# Patient Record
Sex: MALE | Race: WHITE | NOT HISPANIC OR LATINO | Employment: OTHER | URBAN - METROPOLITAN AREA
[De-identification: names, ages, dates, MRNs, and addresses within clinical notes are randomized per-mention and may not be internally consistent; named-entity substitution may affect disease eponyms.]

---

## 2017-02-06 ENCOUNTER — HOSPITAL ENCOUNTER (OUTPATIENT)
Dept: RADIOLOGY | Facility: CLINIC | Age: 62
Discharge: HOME | End: 2017-02-06
Payer: COMMERCIAL

## 2017-02-06 ENCOUNTER — ALLSCRIPTS OFFICE VISIT (OUTPATIENT)
Dept: OTHER | Facility: OTHER | Age: 62
End: 2017-02-06

## 2017-02-06 DIAGNOSIS — R09.89 OTHER SPECIFIED SYMPTOMS AND SIGNS INVOLVING THE CIRCULATORY AND RESPIRATORY SYSTEMS: ICD-10-CM

## 2017-02-06 PROCEDURE — 71020 HB CHEST X-RAY 2VW FRONTAL&LATL: CPT

## 2017-02-08 ENCOUNTER — GENERIC CONVERSION - ENCOUNTER (OUTPATIENT)
Dept: OTHER | Facility: OTHER | Age: 62
End: 2017-02-08

## 2018-01-13 VITALS
HEIGHT: 68 IN | DIASTOLIC BLOOD PRESSURE: 70 MMHG | TEMPERATURE: 99.8 F | WEIGHT: 221 LBS | BODY MASS INDEX: 33.49 KG/M2 | SYSTOLIC BLOOD PRESSURE: 110 MMHG

## 2018-01-16 NOTE — RESULT NOTES
Verified Results  * XR CHEST PA & LATERAL [id number] 02:51PM Madeline Alpers Order Number: [id number]     Test Name Result Flag Reference   XR CHEST PA & LATERAL (Report)     CHEST      INDICATION: Cough, several weeks  COMPARISON: 11/22/2014     VIEWS: Frontal and lateral projections; 2 images     FINDINGS:        Cardiomediastinal silhouette appears unremarkable  The lungs are clear  No pneumothorax or pleural effusion  Visualized osseous structures appear within normal limits for the patient's age  IMPRESSION:     No active pulmonary disease         Workstation performed: [id number]     Signed by:   Evelin Aldrich DO   2/8/17

## 2018-09-10 RX ORDER — DICLOFENAC SODIUM 25 MG/1
1 TABLET, DELAYED RELEASE ORAL DAILY
COMMUNITY
Start: 2016-02-29 | End: 2019-02-11 | Stop reason: ALTCHOICE

## 2018-09-10 RX ORDER — ALBUTEROL SULFATE 90 UG/1
2 AEROSOL, METERED RESPIRATORY (INHALATION)
COMMUNITY
Start: 2016-10-22 | End: 2019-02-11 | Stop reason: ALTCHOICE

## 2018-09-10 RX ORDER — OMEPRAZOLE 20 MG/1
1 CAPSULE, DELAYED RELEASE ORAL DAILY
COMMUNITY
Start: 2016-02-29 | End: 2019-02-11 | Stop reason: ALTCHOICE

## 2018-09-15 ENCOUNTER — OFFICE VISIT (OUTPATIENT)
Dept: FAMILY MEDICINE CLINIC | Facility: CLINIC | Age: 63
End: 2018-09-15
Payer: COMMERCIAL

## 2018-09-15 VITALS
WEIGHT: 226 LBS | HEIGHT: 68 IN | SYSTOLIC BLOOD PRESSURE: 138 MMHG | BODY MASS INDEX: 34.25 KG/M2 | DIASTOLIC BLOOD PRESSURE: 80 MMHG

## 2018-09-15 DIAGNOSIS — N40.1 BENIGN PROSTATIC HYPERPLASIA WITH URINARY FREQUENCY: ICD-10-CM

## 2018-09-15 DIAGNOSIS — N52.9 ERECTILE DYSFUNCTION, UNSPECIFIED ERECTILE DYSFUNCTION TYPE: Primary | ICD-10-CM

## 2018-09-15 DIAGNOSIS — R05.8 ALLERGIC COUGH: ICD-10-CM

## 2018-09-15 DIAGNOSIS — R35.0 BENIGN PROSTATIC HYPERPLASIA WITH URINARY FREQUENCY: ICD-10-CM

## 2018-09-15 DIAGNOSIS — E78.5 HYPERLIPIDEMIA, UNSPECIFIED HYPERLIPIDEMIA TYPE: ICD-10-CM

## 2018-09-15 PROCEDURE — 36415 COLL VENOUS BLD VENIPUNCTURE: CPT | Performed by: FAMILY MEDICINE

## 2018-09-15 PROCEDURE — 99214 OFFICE O/P EST MOD 30 MIN: CPT | Performed by: FAMILY MEDICINE

## 2018-09-15 PROCEDURE — 3008F BODY MASS INDEX DOCD: CPT | Performed by: FAMILY MEDICINE

## 2018-09-15 PROCEDURE — 1036F TOBACCO NON-USER: CPT | Performed by: FAMILY MEDICINE

## 2018-09-15 RX ORDER — LORATADINE 10 MG/1
10 TABLET ORAL DAILY
Qty: 30 TABLET | Refills: 0 | Status: SHIPPED | OUTPATIENT
Start: 2018-09-15 | End: 2018-10-12 | Stop reason: SDUPTHER

## 2018-09-15 RX ORDER — SILDENAFIL 100 MG/1
100 TABLET, FILM COATED ORAL DAILY PRN
Qty: 10 TABLET | Refills: 0 | Status: SHIPPED | OUTPATIENT
Start: 2018-09-15 | End: 2019-08-19 | Stop reason: ALTCHOICE

## 2018-09-15 RX ORDER — DEXTROMETHORPHAN HYDROBROMIDE AND PROMETHAZINE HYDROCHLORIDE 15; 6.25 MG/5ML; MG/5ML
5 SYRUP ORAL 4 TIMES DAILY PRN
Qty: 118 ML | Refills: 0 | Status: SHIPPED | OUTPATIENT
Start: 2018-09-15 | End: 2019-02-11 | Stop reason: ALTCHOICE

## 2018-09-15 NOTE — ASSESSMENT & PLAN NOTE
Patient has symptomatic BPH presently  We are going to check a PSA  He declines alpha blocker presently  He will consider this and will readdress it when we call him with his blood work results  He agrees

## 2018-09-15 NOTE — PROGRESS NOTES
Assessment/Plan:  Erectile dysfunction  Patient is incomplete erectile dysfunction  We are going to give him a trial of Viagra at his request     Benign prostatic hyperplasia with urinary frequency  Patient has symptomatic BPH presently  We are going to check a PSA  He declines alpha blocker presently  He will consider this and will readdress it when we call him with his blood work results  He agrees  Hyperlipidemia  Lipid profile today  Allergic cough  Will have him try loratadine 10 mg daily as well as promethazine DM for relief of his current symptoms  He is asked to call with report over the next several days  He agrees  Diagnoses and all orders for this visit:    Erectile dysfunction, unspecified erectile dysfunction type  -     sildenafil (VIAGRA) 100 mg tablet; Take 1 tablet (100 mg total) by mouth daily as needed for erectile dysfunction    Allergic cough  -     promethazine-dextromethorphan (PHENERGAN-DM) 6 25-15 mg/5 mL oral syrup; Take 5 mL by mouth 4 (four) times a day as needed for cough  -     loratadine (CLARITIN) 10 mg tablet; Take 1 tablet (10 mg total) by mouth daily    Benign prostatic hyperplasia with urinary frequency  -     Comprehensive metabolic panel  -     CBC  -     Lipid panel  -     PSA, Total Screen    Hyperlipidemia, unspecified hyperlipidemia type  -     Comprehensive metabolic panel  -     CBC  -     Lipid panel    Other orders  -     diclofenac (VOLTAREN) 25 MG EC tablet; Take 1 tablet by mouth daily  -     albuterol (PROAIR HFA) 90 mcg/act inhaler; Inhale 2 puffs  -     omeprazole (PriLOSEC) 20 mg delayed release capsule; Take 1 capsule by mouth Daily          Subjective:   Chief Complaint   Patient presents with    Frequent urination        Patient ID: Sophia Torres  is a 65 y o  male  No FH prostate  Dad with colon ca and COPD  HPI  The patient is a 61-year-old male who originally scheduled his visit for evaluation of frequent urination    States that his coworkers call him  Pissy " due to his frequent urination  He admits to nocturia x2 to 3 times at night  He does note that he drinks a lot of water and he believes this may be the reason behind it  He does have some hesitancy  He also complains of erectile dysfunction which is incomplete  He does get an erection but it seems to diminish after a minute or 2 and he has a difficult time completing intercourse  He has had no hematuria no dysuria  No flank pain  No fevers chills constitutional symptoms  He has a family history of colon cancer in his father but he has no known family history of prostate cancer  Currently also has a cough that he has had for a week or more  It is minimally productive of clear sputum at times  He has no chest pain shortness of breath or wheezing  He does have congestion which is present year around  He states that approximately once a day he can clear mucoid material from his throat and then feels clear for the rest of the day  He has had some sneezing and itchy watery eyes  No fevers or chills or other constitutional symptoms   The following portions of the patient's history were reviewed and updated as appropriate: allergies, current medications, past family history, past medical history, past social history, past surgical history and problem list     Review of Systems   Constitution: Negative for chills, decreased appetite, fever and malaise/fatigue  HENT: Positive for congestion  Negative for ear pain and sore throat  Cardiovascular: Negative for chest pain and leg swelling  Respiratory: Positive for cough  Negative for hemoptysis, shortness of breath, sputum production and wheezing  Skin: Negative for rash  Gastrointestinal: Negative for bowel incontinence, constipation and diarrhea          He states his colonoscopy is current though we do not have record of it presently Genitourinary: Positive for frequency, nocturia and urgency  Negative for bladder incontinence, dysuria, flank pain, hematuria and hesitancy  Psychiatric/Behavioral: Negative for depression  The patient has insomnia  The patient is not nervous/anxious  Objective:    Physical Exam   Constitutional: He is oriented to person, place, and time  He appears well-developed and well-nourished  No distress  HENT:   Mouth/Throat: Oropharynx is clear and moist  No oropharyngeal exudate  Boggy nasal turbinates with watery nasal discharge   Eyes: Conjunctivae are normal  Right eye exhibits no discharge  Left eye exhibits no discharge  No scleral icterus  Neck: Neck supple  No JVD present  No thyromegaly present  Cardiovascular: Normal rate, regular rhythm and normal heart sounds  Exam reveals no gallop  No murmur heard  Pulmonary/Chest: Effort normal and breath sounds normal  No respiratory distress  He has no wheezes  He does have an occasional scattered rhonchi  No crackles or wheezing and normal excursion  Genitourinary: Rectum normal    Genitourinary Comments: Anal tone is normal   Rectum is without mass  Prostate is 2+ and there is no nodule or other focality present  Musculoskeletal: He exhibits no edema or tenderness  Lymphadenopathy:     He has no cervical adenopathy  Neurological: He is alert and oriented to person, place, and time  Psychiatric: He has a normal mood and affect   Thought content normal

## 2018-09-15 NOTE — ASSESSMENT & PLAN NOTE
Patient is incomplete erectile dysfunction    We are going to give him a trial of Viagra at his request

## 2018-09-15 NOTE — ASSESSMENT & PLAN NOTE
Will have him try loratadine 10 mg daily as well as promethazine DM for relief of his current symptoms  He is asked to call with report over the next several days  He agrees

## 2018-09-16 LAB
ALBUMIN SERPL-MCNC: 4.2 G/DL (ref 3.6–5.1)
ALBUMIN/GLOB SERPL: 1.8 (CALC) (ref 1–2.5)
ALP SERPL-CCNC: 78 U/L (ref 40–115)
ALT SERPL-CCNC: 25 U/L (ref 9–46)
AST SERPL-CCNC: 21 U/L (ref 10–35)
BILIRUB SERPL-MCNC: 0.4 MG/DL (ref 0.2–1.2)
BUN SERPL-MCNC: 13 MG/DL (ref 7–25)
BUN/CREAT SERPL: NORMAL (CALC) (ref 6–22)
CALCIUM SERPL-MCNC: 9.1 MG/DL (ref 8.6–10.3)
CHLORIDE SERPL-SCNC: 105 MMOL/L (ref 98–110)
CHOLEST SERPL-MCNC: 213 MG/DL
CHOLEST/HDLC SERPL: 4.8 (CALC)
CO2 SERPL-SCNC: 23 MMOL/L (ref 20–32)
CREAT SERPL-MCNC: 1.08 MG/DL (ref 0.7–1.25)
ERYTHROCYTE [DISTWIDTH] IN BLOOD BY AUTOMATED COUNT: 13.4 % (ref 11–15)
GLOBULIN SER CALC-MCNC: 2.4 G/DL (CALC) (ref 1.9–3.7)
GLUCOSE SERPL-MCNC: 99 MG/DL (ref 65–99)
HCT VFR BLD AUTO: 46.8 % (ref 38.5–50)
HDLC SERPL-MCNC: 44 MG/DL
HGB BLD-MCNC: 15.5 G/DL (ref 13.2–17.1)
LDLC SERPL CALC-MCNC: 145 MG/DL (CALC)
MCH RBC QN AUTO: 29.4 PG (ref 27–33)
MCHC RBC AUTO-ENTMCNC: 33.1 G/DL (ref 32–36)
MCV RBC AUTO: 88.6 FL (ref 80–100)
NONHDLC SERPL-MCNC: 169 MG/DL (CALC)
PLATELET # BLD AUTO: 247 THOUSAND/UL (ref 140–400)
PMV BLD REES-ECKER: 11.6 FL (ref 7.5–12.5)
POTASSIUM SERPL-SCNC: 4.3 MMOL/L (ref 3.5–5.3)
PROT SERPL-MCNC: 6.6 G/DL (ref 6.1–8.1)
PSA SERPL-MCNC: 0.8 NG/ML
RBC # BLD AUTO: 5.28 MILLION/UL (ref 4.2–5.8)
SL AMB EGFR AFRICAN AMERICAN: 85 ML/MIN/1.73M2
SL AMB EGFR NON AFRICAN AMERICAN: 74 ML/MIN/1.73M2
SODIUM SERPL-SCNC: 137 MMOL/L (ref 135–146)
TRIGL SERPL-MCNC: 119 MG/DL
WBC # BLD AUTO: 7.7 THOUSAND/UL (ref 3.8–10.8)

## 2018-10-12 DIAGNOSIS — R05.8 ALLERGIC COUGH: ICD-10-CM

## 2018-10-12 RX ORDER — LORATADINE 10 MG/1
TABLET ORAL
Qty: 30 TABLET | Refills: 0 | Status: SHIPPED | OUTPATIENT
Start: 2018-10-12 | End: 2019-02-11 | Stop reason: ALTCHOICE

## 2019-02-11 ENCOUNTER — OFFICE VISIT (OUTPATIENT)
Dept: FAMILY MEDICINE CLINIC | Facility: CLINIC | Age: 64
End: 2019-02-11
Payer: COMMERCIAL

## 2019-02-11 VITALS
TEMPERATURE: 98.7 F | SYSTOLIC BLOOD PRESSURE: 122 MMHG | HEIGHT: 68 IN | BODY MASS INDEX: 30.77 KG/M2 | HEART RATE: 94 BPM | DIASTOLIC BLOOD PRESSURE: 74 MMHG | WEIGHT: 203 LBS | OXYGEN SATURATION: 96 %

## 2019-02-11 DIAGNOSIS — R11.2 NON-INTRACTABLE VOMITING WITH NAUSEA, UNSPECIFIED VOMITING TYPE: Primary | ICD-10-CM

## 2019-02-11 DIAGNOSIS — Z23 ENCOUNTER FOR IMMUNIZATION: ICD-10-CM

## 2019-02-11 DIAGNOSIS — R63.4 WEIGHT LOSS: ICD-10-CM

## 2019-02-11 PROCEDURE — 99214 OFFICE O/P EST MOD 30 MIN: CPT | Performed by: FAMILY MEDICINE

## 2019-02-11 PROCEDURE — 1036F TOBACCO NON-USER: CPT | Performed by: FAMILY MEDICINE

## 2019-02-11 PROCEDURE — 3008F BODY MASS INDEX DOCD: CPT | Performed by: FAMILY MEDICINE

## 2019-02-11 RX ORDER — OMEPRAZOLE 40 MG/1
40 CAPSULE, DELAYED RELEASE ORAL
Qty: 30 CAPSULE | Refills: 5 | Status: SHIPPED | OUTPATIENT
Start: 2019-02-11 | End: 2019-04-04 | Stop reason: SDUPTHER

## 2019-02-11 NOTE — LETTER
February 11, 2019     Patient: Gael Vela  YOB: 1955   Date of Visit: 2/11/2019       To Whom it May Concern:    Ilsa Saleh is under my professional care  He was seen in my office on 2/11/2019  He may return to work on 2/25/2019  If you have any questions or concerns, please don't hesitate to call           Sincerely,          Sandy Quiroz MD        CC: No Recipients

## 2019-02-11 NOTE — LETTER
February 11, 2019     Patient: Amando Galvez  YOB: 1955   Date of Visit: 2/11/2019       To Whom it May Concern:    Annalee Galileo is under my professional care  He was seen in my office on 2/11/2019  He may return to work on 2/25/2019  If you have any questions or concerns, please don't hesitate to call           Sincerely,          Ramon Santillan MD        CC: No Recipients

## 2019-02-11 NOTE — ASSESSMENT & PLAN NOTE
Patient has had some persistent vomiting and anorexia  He has also suffered greater than 20 lb weight loss  Does drink significant alcohol use tobacco   I am concerned about a GI malignancy such as esophageal or gastric carcinoma  We are going to have him go for an upper GI as well as a CT of the abdomen  Additionally will get a repeat CBC and CMP  We are going to follow up with him as soon as results are available  We are going to have him stay out of work for 2 weeks or until we have resolved this issue for him  He and wife agree  Additionally, we are going to start him on omeprazole 40 mg daily

## 2019-02-11 NOTE — PROGRESS NOTES
Assessment/Plan:  Weight loss  Patient has had some persistent vomiting and anorexia  He has also suffered greater than 20 lb weight loss  Does drink significant alcohol use tobacco   I am concerned about a GI malignancy such as esophageal or gastric carcinoma  We are going to have him go for an upper GI as well as a CT of the abdomen  Additionally will get a repeat CBC and CMP  We are going to follow up with him as soon as results are available  We are going to have him stay out of work for 2 weeks or until we have resolved this issue for him  He and wife agree  Additionally, we are going to start him on omeprazole 40 mg daily  Diagnoses and all orders for this visit:    Non-intractable vomiting with nausea, unspecified vomiting type  -     CT abdomen w contrast; Future  -     FL UGI W/ AIR ROUTINE; Future  -     omeprazole (PriLOSEC) 40 MG capsule; Take 1 capsule (40 mg total) by mouth daily before breakfast  -     CBC  -     Comprehensive metabolic panel    Weight loss  -     CT abdomen w contrast; Future  -     FL UGI W/ AIR ROUTINE; Future  -     CBC  -     Comprehensive metabolic panel    Encounter for immunization  -     PREFERRED: influenza vaccine, [id number], quadrivalent, recombinant, PF, 0 5 mL, for patients 18 yr+ (FLUBLOK)          Subjective:   Chief Complaint   Patient presents with    GI Problem     having problem keeping food down in the december off and on  pt declined the flu shot   I threw up 8 days out of 10 over the holidays  This past Tuesday I threw up lunch and dinner  No early satiety  No blood or hematemeses  No dysphagia  Feels weak  No abdominal pain  No melena, hematochezia, change in caliber  No night sweats  No GERD sx  ETOH was 2-3 days a week  3-4 day  Denies anxiety and depression over daughters situation  Patient ID: Yue Sahu  is a 59 y o  male      HPI  The patient is a 80-year-old male who states that just before the Jack[place] holiday he felt ill and vomited  Subsequently he vomited 8 days out of 10 through the holiday  His symptoms have waxed and waned since then  This past Tuesday he throat his lunch in his dinner  He has lost over 20 lb since he was here this past September  At that time we noted that he had fasting blood work to include CBC CMP and PSA which were all normal   He had some mildly elevated lipids  He denies any early satiety  He has had no hematemesis or clots in his vomitus  He denies any dysphagia  He has some nausea but no specific abdominal pain  He has had no melena, hepatic easy a or change in his stool caliber  He has had no night sweats  He has had no GERD symptoms recently  He does drink alcohol 2 to 3 times a week at least 3 to 4 times a day  He also uses oral tobacco though he does not smoke  He has had some emotional distress related to his daughter's situation  She lives with them  She had called the police on him at 1 time  She has issues with drug abuse as well as medical problems and tells the patient and his wife that they need to be the 1's seeing a counselor  The following portions of the patient's history were reviewed and updated as appropriate: allergies, current medications, past family history, past medical history, past social history, past surgical history and problem list     Review of Systems   Constitution: Positive for decreased appetite, malaise/fatigue and weight loss  Negative for night sweats  Cardiovascular: Negative for chest pain  Respiratory: Negative for cough and shortness of breath  Hematologic/Lymphatic: Negative for adenopathy and bleeding problem  Does not bruise/bleed easily  Gastrointestinal: Positive for nausea and vomiting  Negative for bowel incontinence, constipation, dysphagia, heartburn, hematemesis, hematochezia, jaundice and melena  Psychiatric/Behavioral: Negative for depression and substance abuse  The patient is not nervous/anxious           Alcohol and oral tobacco product use         Objective:    Physical Exam   Constitutional: He is oriented to person, place, and time  He appears well-developed and well-nourished  Somewhat chronically ill-appearing today  We note that he has lost over 20 lb since his visit in September  HENT:   Mouth/Throat: No oropharyngeal exudate  No ulcer exudate or lesion is noted   Eyes: Conjunctivae are normal  Right eye exhibits no discharge  Left eye exhibits no discharge  No scleral icterus  Neck: Neck supple  No JVD present  No thyromegaly present  Cardiovascular: Normal rate, regular rhythm and normal heart sounds  Pulmonary/Chest: Effort normal and breath sounds normal  No respiratory distress  Abdominal: Soft  Bowel sounds are normal  He exhibits no distension and no mass  There is no tenderness  He has no obvious mass organomegaly present  Does have some mild epigastric tenderness without peritoneal sign  He does have a fingertip sized umbilical hernia which is easily reducible  Musculoskeletal: He exhibits no edema  Lymphadenopathy:     He has no cervical adenopathy  Neurological: He is alert and oriented to person, place, and time  Psychiatric: He has a normal mood and affect  Thought content normal    Nursing note and vitals reviewed

## 2019-02-12 LAB
ALBUMIN SERPL-MCNC: 3.9 G/DL (ref 3.6–5.1)
ALBUMIN/GLOB SERPL: 1.8 (CALC) (ref 1–2.5)
ALP SERPL-CCNC: 70 U/L (ref 40–115)
ALT SERPL-CCNC: 16 U/L (ref 9–46)
AST SERPL-CCNC: 16 U/L (ref 10–35)
BILIRUB SERPL-MCNC: 0.4 MG/DL (ref 0.2–1.2)
BUN SERPL-MCNC: 20 MG/DL (ref 7–25)
BUN/CREAT SERPL: NORMAL (CALC) (ref 6–22)
CALCIUM SERPL-MCNC: 8.9 MG/DL (ref 8.6–10.3)
CHLORIDE SERPL-SCNC: 103 MMOL/L (ref 98–110)
CO2 SERPL-SCNC: 26 MMOL/L (ref 20–32)
CREAT SERPL-MCNC: 1.25 MG/DL (ref 0.7–1.25)
ERYTHROCYTE [DISTWIDTH] IN BLOOD BY AUTOMATED COUNT: 12.8 % (ref 11–15)
GLOBULIN SER CALC-MCNC: 2.2 G/DL (CALC) (ref 1.9–3.7)
GLUCOSE SERPL-MCNC: 95 MG/DL (ref 65–99)
HCT VFR BLD AUTO: 45 % (ref 38.5–50)
HGB BLD-MCNC: 15.3 G/DL (ref 13.2–17.1)
MCH RBC QN AUTO: 28.4 PG (ref 27–33)
MCHC RBC AUTO-ENTMCNC: 34 G/DL (ref 32–36)
MCV RBC AUTO: 83.6 FL (ref 80–100)
PLATELET # BLD AUTO: 283 THOUSAND/UL (ref 140–400)
PMV BLD REES-ECKER: 11.9 FL (ref 7.5–12.5)
POTASSIUM SERPL-SCNC: 4.2 MMOL/L (ref 3.5–5.3)
PROT SERPL-MCNC: 6.1 G/DL (ref 6.1–8.1)
RBC # BLD AUTO: 5.38 MILLION/UL (ref 4.2–5.8)
SL AMB EGFR AFRICAN AMERICAN: 71 ML/MIN/1.73M2
SL AMB EGFR NON AFRICAN AMERICAN: 61 ML/MIN/1.73M2
SODIUM SERPL-SCNC: 138 MMOL/L (ref 135–146)
WBC # BLD AUTO: 8.4 THOUSAND/UL (ref 3.8–10.8)

## 2019-02-14 ENCOUNTER — HOSPITAL ENCOUNTER (OUTPATIENT)
Dept: RADIOLOGY | Facility: HOSPITAL | Age: 64
Discharge: HOME | End: 2019-02-14
Payer: COMMERCIAL

## 2019-02-14 DIAGNOSIS — R63.4 WEIGHT LOSS: ICD-10-CM

## 2019-02-14 DIAGNOSIS — R11.2 NON-INTRACTABLE VOMITING WITH NAUSEA, UNSPECIFIED VOMITING TYPE: ICD-10-CM

## 2019-02-14 PROCEDURE — 74246 X-RAY XM UPR GI TRC 2CNTRST: CPT

## 2019-02-18 ENCOUNTER — HOSPITAL ENCOUNTER (OUTPATIENT)
Dept: CT IMAGING | Facility: HOSPITAL | Age: 64
Discharge: HOME | End: 2019-02-18
Attending: FAMILY MEDICINE
Payer: COMMERCIAL

## 2019-02-18 DIAGNOSIS — R11.2 NON-INTRACTABLE VOMITING WITH NAUSEA, UNSPECIFIED VOMITING TYPE: ICD-10-CM

## 2019-02-18 DIAGNOSIS — R63.4 WEIGHT LOSS: ICD-10-CM

## 2019-02-18 PROCEDURE — 74177 CT ABD & PELVIS W/CONTRAST: CPT

## 2019-02-18 RX ADMIN — IOHEXOL 100 ML: 350 INJECTION, SOLUTION INTRAVENOUS at 09:57

## 2019-04-04 DIAGNOSIS — R11.2 NON-INTRACTABLE VOMITING WITH NAUSEA, UNSPECIFIED VOMITING TYPE: ICD-10-CM

## 2019-04-04 RX ORDER — OMEPRAZOLE 40 MG/1
40 CAPSULE, DELAYED RELEASE ORAL
Qty: 30 CAPSULE | Refills: 5 | Status: SHIPPED | OUTPATIENT
Start: 2019-04-04 | End: 2019-08-19 | Stop reason: ALTCHOICE

## 2019-08-19 ENCOUNTER — OFFICE VISIT (OUTPATIENT)
Dept: FAMILY MEDICINE CLINIC | Facility: CLINIC | Age: 64
End: 2019-08-19
Payer: COMMERCIAL

## 2019-08-19 VITALS
TEMPERATURE: 98.4 F | WEIGHT: 219 LBS | HEIGHT: 68 IN | BODY MASS INDEX: 33.19 KG/M2 | DIASTOLIC BLOOD PRESSURE: 84 MMHG | SYSTOLIC BLOOD PRESSURE: 120 MMHG | HEART RATE: 76 BPM | OXYGEN SATURATION: 97 %

## 2019-08-19 DIAGNOSIS — Z11.59 NEED FOR HEPATITIS C SCREENING TEST: ICD-10-CM

## 2019-08-19 DIAGNOSIS — M25.462 EFFUSION OF LEFT KNEE: Primary | ICD-10-CM

## 2019-08-19 DIAGNOSIS — R11.2 NON-INTRACTABLE VOMITING WITH NAUSEA, UNSPECIFIED VOMITING TYPE: ICD-10-CM

## 2019-08-19 DIAGNOSIS — K21.9 GASTROESOPHAGEAL REFLUX DISEASE, ESOPHAGITIS PRESENCE NOT SPECIFIED: ICD-10-CM

## 2019-08-19 PROCEDURE — 1036F TOBACCO NON-USER: CPT | Performed by: FAMILY MEDICINE

## 2019-08-19 PROCEDURE — 99214 OFFICE O/P EST MOD 30 MIN: CPT | Performed by: FAMILY MEDICINE

## 2019-08-19 PROCEDURE — 3008F BODY MASS INDEX DOCD: CPT | Performed by: FAMILY MEDICINE

## 2019-08-19 RX ORDER — OMEPRAZOLE 40 MG/1
40 CAPSULE, DELAYED RELEASE ORAL
Qty: 30 CAPSULE | Refills: 1 | Status: SHIPPED | OUTPATIENT
Start: 2019-08-19 | End: 2019-10-03

## 2019-08-19 RX ORDER — PREDNISONE 10 MG/1
10 TABLET ORAL DAILY
Qty: 22 TABLET | Refills: 0 | Status: SHIPPED | OUTPATIENT
Start: 2019-08-19 | End: 2019-09-03 | Stop reason: ALTCHOICE

## 2019-08-19 NOTE — ASSESSMENT & PLAN NOTE
Patient has an acute arthritis of his left knee  Ten she will etiologies are numerous  We are going to get a CBC, CMP, rheumatoid arthritis titer, Lyme titer, sed rate and C reactive protein  We are going to start him on a prednisone taper  He is asked to continue with his Prilosec, push fluids and rest   Will follow up with him in 2 weeks for re-evaluation  In the meantime should he develop any progressive his condition is going to call sooner seek more urgent medical attention  He agrees

## 2019-08-19 NOTE — PROGRESS NOTES
Assessment/Plan:  Effusion of left knee  Patient has an acute arthritis of his left knee  Ten she will etiologies are numerous  We are going to get a CBC, CMP, rheumatoid arthritis titer, Lyme titer, sed rate and C reactive protein  We are going to start him on a prednisone taper  He is asked to continue with his Prilosec, push fluids and rest   Will follow up with him in 2 weeks for re-evaluation  In the meantime should he develop any progressive his condition is going to call sooner seek more urgent medical attention  He agrees  Acid reflux disease  Take omeprazole while using prednisone  Diagnoses and all orders for this visit:    Effusion of left knee  -     predniSONE 10 mg tablet; Take 1 tablet (10 mg total) by mouth daily 4 daily for 2 days then 3 daily for 2 days then 2 daily for 2 days then 1 daily for 4 days    Gastroesophageal reflux disease, esophagitis presence not specified          Subjective:   Chief Complaint   Patient presents with    Knee Pain     L knee pain x 2 weeks  Patient ID: Marina Alexis  is a 59 y o  male  HPI  The patient is a 28-year-old male who states that he developed left knee pain about 2 weeks ago  He had no injury that he recalls no slip fall or twist   He has significant swelling  He does have multiple other joints which are chronically uncomfortable to him  He does have history of autoimmune disease  He use to see Rheumatology, Heena Renner who was treating him for unspecified rheumatologic disease  He has not seen him recently  Does note that his right shoulder is bothering him more recently which was a symptom previously  He has had no fevers chills or constitutional symptoms presently  He had recalls no tick bite or rash  He has had no clicking popping of his knee  He does have feeling of instability though he has not had a fall         The following portions of the patient's history were reviewed and updated as appropriate: allergies, current medications, past family history, past medical history, past social history, past surgical history and problem list     Review of Systems   Constitution: Negative for chills, fever, malaise/fatigue, weight gain and weight loss  Cardiovascular: Negative  Respiratory: Negative  Endocrine: Negative  Hematologic/Lymphatic: Negative for adenopathy and bleeding problem  Does not bruise/bleed easily  Skin: Negative for rash  Musculoskeletal: Positive for joint pain, joint swelling and stiffness  Negative for falls and gout  Genitourinary: Negative  Neurological: Negative  Objective:    Physical Exam   Constitutional: He is oriented to person, place, and time  He appears well-developed and well-nourished  No distress  Cardiovascular: Normal rate, regular rhythm and normal heart sounds  Pulmonary/Chest: Effort normal and breath sounds normal  No respiratory distress  He has no wheezes  He has no rales  Abdominal: Soft  Bowel sounds are normal    Musculoskeletal: He exhibits tenderness  He exhibits no edema or deformity  He has no edema, calf tenderness or Homans sign  He does have a mild-to-moderate effusion of his left knee  Mild warmth though no erythema  His knee is stable with valgus varus and anterior drawer  He does lack 10-15 degrees of extension and cannot flex past 90°  Neurological: He is alert and oriented to person, place, and time  Skin: No rash noted  Psychiatric: He has a normal mood and affect  Thought content normal    Nursing note and vitals reviewed  BMI Counseling: Body mass index is 33 79 kg/m²  Discussed the patient's BMI with him  The BMI is above average  BMI counseling and education was provided to the patient  Nutrition recommendations include reducing portion sizes, consuming healthier snacks and moderation in carbohydrate intake  Exercise recommendations include exercising 3-5 times per week

## 2019-08-21 LAB
B BURGDOR AB SER QL IA: 1.02 INDEX
B BURGDOR IGG SER QL IB: NEGATIVE
B BURGDOR IGM SER QL IB: NEGATIVE
B BURGDOR18KD IGG SER QL IB: REACTIVE
B BURGDOR23KD IGG SER QL IB: REACTIVE
B BURGDOR23KD IGM SER QL IB: REACTIVE
B BURGDOR28KD IGG SER QL IB: ABNORMAL
B BURGDOR30KD IGG SER QL IB: ABNORMAL
B BURGDOR39KD IGG SER QL IB: ABNORMAL
B BURGDOR39KD IGM SER QL IB: ABNORMAL
B BURGDOR41KD IGG SER QL IB: ABNORMAL
B BURGDOR41KD IGM SER QL IB: ABNORMAL
B BURGDOR45KD IGG SER QL IB: ABNORMAL
B BURGDOR58KD IGG SER QL IB: ABNORMAL
B BURGDOR66KD IGG SER QL IB: ABNORMAL
B BURGDOR93KD IGG SER QL IB: ABNORMAL
CRP SERPL-MCNC: 11.2 MG/L
ERYTHROCYTE [DISTWIDTH] IN BLOOD BY AUTOMATED COUNT: 13.5 % (ref 11–15)
ERYTHROCYTE [SEDIMENTATION RATE] IN BLOOD BY WESTERGREN METHOD: 2 MM/H
HCT VFR BLD AUTO: 44.4 % (ref 38.5–50)
HCV AB S/CO SERPL IA: 0.01
HCV AB SERPL QL IA: NORMAL
HGB BLD-MCNC: 14.6 G/DL (ref 13.2–17.1)
MCH RBC QN AUTO: 29.2 PG (ref 27–33)
MCHC RBC AUTO-ENTMCNC: 32.9 G/DL (ref 32–36)
MCV RBC AUTO: 88.8 FL (ref 80–100)
PLATELET # BLD AUTO: 246 THOUSAND/UL (ref 140–400)
PMV BLD REES-ECKER: 11.1 FL (ref 7.5–12.5)
RBC # BLD AUTO: 5 MILLION/UL (ref 4.2–5.8)
RHEUMATOID FACT SERPL-ACNC: <14 IU/ML
WBC # BLD AUTO: 9.2 THOUSAND/UL (ref 3.8–10.8)

## 2019-09-01 ENCOUNTER — HOSPITAL ENCOUNTER (EMERGENCY)
Facility: HOSPITAL | Age: 64
Discharge: HOME | End: 2019-09-01
Attending: EMERGENCY MEDICINE | Admitting: EMERGENCY MEDICINE
Payer: COMMERCIAL

## 2019-09-01 VITALS
SYSTOLIC BLOOD PRESSURE: 143 MMHG | DIASTOLIC BLOOD PRESSURE: 85 MMHG | OXYGEN SATURATION: 97 % | TEMPERATURE: 99 F | RESPIRATION RATE: 16 BRPM | HEART RATE: 90 BPM

## 2019-09-01 DIAGNOSIS — M25.562 ACUTE PAIN OF LEFT KNEE: Primary | ICD-10-CM

## 2019-09-01 DIAGNOSIS — A69.20 LYME DISEASE: ICD-10-CM

## 2019-09-01 PROCEDURE — 99284 EMERGENCY DEPT VISIT MOD MDM: CPT | Performed by: PHYSICIAN ASSISTANT

## 2019-09-01 PROCEDURE — 99283 EMERGENCY DEPT VISIT LOW MDM: CPT

## 2019-09-01 RX ORDER — TRAMADOL HYDROCHLORIDE 50 MG/1
50 TABLET ORAL EVERY 6 HOURS PRN
Qty: 10 TABLET | Refills: 0 | Status: SHIPPED | OUTPATIENT
Start: 2019-09-01 | End: 2019-09-03 | Stop reason: ALTCHOICE

## 2019-09-01 RX ORDER — DOXYCYCLINE HYCLATE 100 MG/1
100 CAPSULE ORAL EVERY 12 HOURS SCHEDULED
Qty: 42 CAPSULE | Refills: 0 | Status: SHIPPED | OUTPATIENT
Start: 2019-09-01 | End: 2019-09-10

## 2019-09-01 RX ORDER — TRAMADOL HYDROCHLORIDE 50 MG/1
50 TABLET ORAL ONCE
Status: COMPLETED | OUTPATIENT
Start: 2019-09-01 | End: 2019-09-01

## 2019-09-01 RX ADMIN — TRAMADOL HYDROCHLORIDE 50 MG: 50 TABLET, FILM COATED ORAL at 16:18

## 2019-09-01 NOTE — ED PROVIDER NOTES
History  Chief Complaint   Patient presents with    Knee Pain     Pt arrives to ED with complaint of knee pain and swelling in the left knee for the last 2 weeks pt did had surgery in the same knee 25 years ago but has never had anything like this before  44-year-old male with no significant past medical history presenting for evaluation left knee pain  Patient was recently seen at his PCP on 08/19 for the same complaint  At that time PCP sent lab work including Lyme, rheumatoid factor, CRP, sed rate  PCP started pt on prednisone taper upon discharge  Pt reports he felt improvement of the pain for the first couple of days using the steroids however when the dose started decreasing he had worsening of the pain  He reports since that time he has had continued pain and swelling of the left knee  He reports using ibuprofen at home with only minimal relief  Prior to Admission Medications   Prescriptions Last Dose Informant Patient Reported? Taking?   omeprazole (PriLOSEC) 40 MG capsule   No No   Sig: Take 1 capsule (40 mg total) by mouth daily before breakfast   predniSONE 10 mg tablet   No No   Sig: Take 1 tablet (10 mg total) by mouth daily 4 daily for 2 days then 3 daily for 2 days then 2 daily for 2 days then 1 daily for 4 days      Facility-Administered Medications: None       Past Medical History:   Diagnosis Date    Abnormal ECG     Last Assessed 2/29/2016    CAP (community acquired pneumonia) 10/12/2016       Past Surgical History:   Procedure Laterality Date    INGUINAL HERNIA REPAIR         Family History   Problem Relation Age of Onset    Cancer Family     Colon cancer Father     COPD Father      I have reviewed and agree with the history as documented  Social History     Tobacco Use    Smoking status: Former Smoker    Smokeless tobacco: Current User   Substance Use Topics    Alcohol use:  Yes    Drug use: No        Review of Systems   All other systems reviewed and are negative  Physical Exam  Physical Exam   Constitutional: He is oriented to person, place, and time  He appears well-developed and well-nourished  No distress  HENT:   Head: Normocephalic and atraumatic  Eyes: Conjunctivae are normal    EOM grossly intact   Neck: Normal range of motion  Neck supple  No JVD present  Cardiovascular: Normal rate  Pulmonary/Chest: Effort normal    Abdominal: Soft  Musculoskeletal:        Legs:  FROM, steady gait, cap refill brisk, strength and sensation grossly intact throughout   Neurological: He is alert and oriented to person, place, and time  Skin: Skin is warm and dry  Capillary refill takes less than 2 seconds  Psychiatric: He has a normal mood and affect  His behavior is normal    Nursing note and vitals reviewed        Vital Signs  ED Triage Vitals   Temperature Pulse Respirations Blood Pressure SpO2   09/01/19 1602 09/01/19 1559 09/01/19 1559 09/01/19 1559 09/01/19 1559   99 °F (37 2 °C) 90 16 143/85 97 %      Temp Source Heart Rate Source Patient Position - Orthostatic VS BP Location FiO2 (%)   09/01/19 1602 09/01/19 1559 09/01/19 1559 09/01/19 1559 --   Oral Monitor Lying Right arm       Pain Score       09/01/19 1559       No Pain           Vitals:    09/01/19 1559   BP: 143/85   Pulse: 90   Patient Position - Orthostatic VS: Lying         Visual Acuity      ED Medications  Medications   traMADol (ULTRAM) tablet 50 mg (50 mg Oral Given 9/1/19 1618)       Diagnostic Studies  Results Reviewed     None                 No orders to display              Procedures  Procedures       ED Course                               MDM  Number of Diagnoses or Management Options  Acute pain of left knee:   Lyme disease:   Diagnosis management comments: 44-year-old male presenting for evaluation of continued left knee swelling and pain, upon review of patient's records he did have a positive Lyme IgM, will treat for Lyme disease with doxycycline, advised him to follow up with his PCP and Rheumatology as an outpatient, he is distally neurovascularly intact, no signs of cellulitis or septic arthritis    strict return to ED precautions discussed  Pt verbalizes understanding and agrees with plan  Pt is stable for discharge    Portions of the record may have been created with voice recognition software  Occasional wrong word or "sound a like" substitutions may have occurred due to the inherent limitations of voice recognition software  Read the chart carefully and recognize, using context, where substitutions have occurred  Disposition  Final diagnoses:   Acute pain of left knee   Lyme disease     Time reflects when diagnosis was documented in both MDM as applicable and the Disposition within this note     Time User Action Codes Description Comment    9/1/2019  4:14 PM Berto Kan [M25 [id number]] Acute pain of left knee     9/1/2019  4:14 PM Berto Kan [A69 20] Lyme disease       ED Disposition     ED Disposition Condition Date/Time Comment    Discharge Stable [place] Sep 1, 2019  4:14 PM Michael Maher Sr  discharge to home/self care              Follow-up Information     Follow up With Specialties Details Why Contact Info    Mario Frey MD Family Medicine Call in 1 day  [address] [place] [address]            Discharge Medication List as of 9/1/2019  4:16 PM      START taking these medications    Details   doxycycline hyclate (VIBRAMYCIN) 100 mg capsule Take 1 capsule (100 mg total) by mouth every 12 (twelve) hours for 21 days, Starting Sun 9/1/2019, Until Sun 9/22/2019, Print      traMADol (ULTRAM) 50 mg tablet Take 1 tablet (50 mg total) by mouth every 6 (six) hours as needed for moderate pain for up to 10 days, Starting Sun 9/1/2019, Until Wed 9/11/2019, Print         CONTINUE these medications which have NOT CHANGED    Details   omeprazole (PriLOSEC) 40 MG capsule Take 1 capsule (40 mg total) by mouth daily before breakfast, Starting Mon 8/19/2019, Normal      predniSONE 10 mg tablet Take 1 tablet (10 mg total) by mouth daily 4 daily for 2 days then 3 daily for 2 days then 2 daily for 2 days then 1 daily for 4 days, Starting Mon 8/19/2019, Normal           No discharge procedures on file      ED Provider  Electronically Signed by           Andreas Williamson PA-C  09/01/19 1162

## 2019-09-03 ENCOUNTER — OFFICE VISIT (OUTPATIENT)
Dept: FAMILY MEDICINE CLINIC | Facility: CLINIC | Age: 64
End: 2019-09-03
Payer: COMMERCIAL

## 2019-09-03 ENCOUNTER — HOSPITAL ENCOUNTER (OUTPATIENT)
Dept: RADIOLOGY | Facility: HOSPITAL | Age: 64
Discharge: HOME | End: 2019-09-03
Payer: COMMERCIAL

## 2019-09-03 VITALS
HEART RATE: 84 BPM | BODY MASS INDEX: 33.19 KG/M2 | SYSTOLIC BLOOD PRESSURE: 140 MMHG | OXYGEN SATURATION: 97 % | DIASTOLIC BLOOD PRESSURE: 90 MMHG | HEIGHT: 68 IN | WEIGHT: 219 LBS | TEMPERATURE: 98.6 F

## 2019-09-03 DIAGNOSIS — M25.462 EFFUSION OF LEFT KNEE: ICD-10-CM

## 2019-09-03 DIAGNOSIS — M25.462 EFFUSION OF LEFT KNEE: Primary | ICD-10-CM

## 2019-09-03 PROCEDURE — 73562 X-RAY EXAM OF KNEE 3: CPT

## 2019-09-03 PROCEDURE — 99214 OFFICE O/P EST MOD 30 MIN: CPT | Performed by: FAMILY MEDICINE

## 2019-09-03 RX ORDER — PREDNISONE 20 MG/1
20 TABLET ORAL 2 TIMES DAILY WITH MEALS
Qty: 20 TABLET | Refills: 0 | Status: SHIPPED | OUTPATIENT
Start: 2019-09-03 | End: 2019-09-26 | Stop reason: ALTCHOICE

## 2019-09-03 RX ORDER — HYDROCODONE BITARTRATE AND ACETAMINOPHEN 5; 325 MG/1; MG/1
1 TABLET ORAL EVERY 6 HOURS PRN
Qty: 20 TABLET | Refills: 0 | Status: SHIPPED | OUTPATIENT
Start: 2019-09-03 | End: 2019-09-10

## 2019-09-03 NOTE — ASSESSMENT & PLAN NOTE
The patient has acute left knee effusion with pain  Though he was told by the emergency room that he had Lyme disease, his Lyme titer is negative  He is going to discontinue his doxycycline  He does have mildly elevated C reactive protein  His rheumatoid titer was negative  His sed rate was normal   This could represent gout or pseudogout  Could also represent intra-articular injury  We are going to give him a larger dose and longer taper prednisone which was effective  We are going to give him some Vicodin to use at bedtime  He will remain off of work  He is going to use ice and rest   Have him go for plain films to start ask Orthopedic surgery to evaluate him  He may need MRI in the future  He and wife agree with this plan of action  Will call sooner as needed

## 2019-09-03 NOTE — PROGRESS NOTES
Assessment/Plan:  Effusion of left knee  The patient has acute left knee effusion with pain  Though he was told by the emergency room that he had Lyme disease, his Lyme titer is negative  He is going to discontinue his doxycycline  He does have mildly elevated C reactive protein  His rheumatoid titer was negative  His sed rate was normal   This could represent gout or pseudogout  Could also represent intra-articular injury  We are going to give him a larger dose and longer taper prednisone which was effective  We are going to give him some Vicodin to use at bedtime  He will remain off of work  He is going to use ice and rest   Have him go for plain films to start ask Orthopedic surgery to evaluate him  He may need MRI in the future  He and wife agree with this plan of action  Will call sooner as needed  Diagnoses and all orders for this visit:    Effusion of left knee  -     XR knee 3 vw left non injury; Future  -     predniSONE 20 mg tablet; Take 1 tablet (20 mg total) by mouth 2 (two) times a day with meals For 5 days then 1 a day  -     HYDROcodone-acetaminophen (NORCO) 5-325 mg per tablet; Take 1 tablet by mouth every 6 (six) hours as needed for painMax Daily Amount: 4 tablets  -     Ambulatory referral to Orthopedic Surgery; Future          Subjective:   Chief Complaint   Patient presents with    Knee Pain     L knee swelling and painful  Patient ID: Radha Child  is a 59 y o  male  HPI  The patient is a 60-year-old male who presents today for worsening of left knee pain  He was initially seen for this in this office on August 19th  He was given a prednisone taper  Blood work was ordered  Blood work revealed a negative Lyme titer, rheumatoid arthritis titer, sed rate, normal CBC, and a mildly elevated C reactive protein  Patient's symptoms initially improved for couple days while he was on the prednisone then began to worsen again    He presented to the [address] [place] [address] Emergency Room 2 days ago  At that time it was felt that he may have acute Lyme disease though again his Lyme titer was negative  He was given some tramadol which has been ineffective for relief of his pain  He has had no fevers or chills or constitutional symptoms  No other joints are affected  No rash X cetera  He denies any significant alcohol use  He has no history of gout  No recent increased intake of shellfish  The following portions of the patient's history were reviewed and updated as appropriate: allergies, current medications, past medical history, past social history, past surgical history and problem list     Review of Systems   Constitution: Negative for chills, decreased appetite, fever and malaise/fatigue  Cardiovascular: Negative for chest pain, irregular heartbeat and leg swelling  Respiratory: Negative for cough, shortness of breath and wheezing  Endocrine: Negative for polydipsia, polyphagia and polyuria  Hematologic/Lymphatic: Negative for adenopathy and bleeding problem  Does not bruise/bleed easily  Skin: Negative for rash  Musculoskeletal: Positive for joint pain, joint swelling and stiffness  Negative for falls, gout and myalgias  Gastrointestinal: Negative for bowel incontinence  Genitourinary: Negative for bladder incontinence  Neurological: Negative for dizziness and headaches  Psychiatric/Behavioral: Negative for depression, substance abuse and suicidal ideas  Objective:    Physical Exam   Constitutional: He is oriented to person, place, and time  He appears well-developed and well-nourished  No distress  Neck: Neck supple  No JVD present  No thyromegaly present  Cardiovascular: Normal rate, regular rhythm and normal heart sounds  Pulmonary/Chest: Effort normal and breath sounds normal  No respiratory distress  He has no wheezes  He has no rales  Musculoskeletal: He exhibits deformity  He exhibits no tenderness     The patient is a moderate left knee effusion  There is no erythema but there is some warmth  There is limitation of flexion greater than extension  No other joints acutely affected   Lymphadenopathy:     He has no cervical adenopathy  Neurological: He is alert and oriented to person, place, and time  Skin: No rash noted  No erythema  Psychiatric: He has a normal mood and affect  Thought content normal    Nursing note and vitals reviewed

## 2019-09-10 ENCOUNTER — OFFICE VISIT (OUTPATIENT)
Dept: OBGYN CLINIC | Facility: CLINIC | Age: 64
End: 2019-09-10
Payer: COMMERCIAL

## 2019-09-10 ENCOUNTER — APPOINTMENT (OUTPATIENT)
Dept: LAB | Facility: HOSPITAL | Age: 64
End: 2019-09-10
Attending: ORTHOPAEDIC SURGERY
Payer: COMMERCIAL

## 2019-09-10 VITALS
WEIGHT: 212 LBS | DIASTOLIC BLOOD PRESSURE: 105 MMHG | BODY MASS INDEX: 32.13 KG/M2 | SYSTOLIC BLOOD PRESSURE: 160 MMHG | HEIGHT: 68 IN | HEART RATE: 72 BPM

## 2019-09-10 DIAGNOSIS — M25.562 ACUTE PAIN OF LEFT KNEE: ICD-10-CM

## 2019-09-10 DIAGNOSIS — M25.462 EFFUSION OF LEFT KNEE: ICD-10-CM

## 2019-09-10 DIAGNOSIS — M25.562 ACUTE PAIN OF LEFT KNEE: Primary | ICD-10-CM

## 2019-09-10 LAB
CRYSTALS SNV QL MICRO: NORMAL
LYMPHOCYTES # SNV MANUAL: 50 %
MONOCYTES NFR SNV MANUAL: 24 %
NEUTROPHILS NFR SNV MANUAL: 26 %
TOTAL CELLS COUNTED SPEC: 100
WBC # FLD MANUAL: 5305 /UL (ref 0–200)

## 2019-09-10 PROCEDURE — 89060 EXAM SYNOVIAL FLUID CRYSTALS: CPT

## 2019-09-10 PROCEDURE — 87205 SMEAR GRAM STAIN: CPT

## 2019-09-10 PROCEDURE — 87070 CULTURE OTHR SPECIMN AEROBIC: CPT

## 2019-09-10 PROCEDURE — 99203 OFFICE O/P NEW LOW 30 MIN: CPT | Performed by: ORTHOPAEDIC SURGERY

## 2019-09-10 PROCEDURE — 89051 BODY FLUID CELL COUNT: CPT

## 2019-09-10 PROCEDURE — 87476 LYME DIS DNA AMP PROBE: CPT

## 2019-09-10 PROCEDURE — 20610 DRAIN/INJ JOINT/BURSA W/O US: CPT | Performed by: ORTHOPAEDIC SURGERY

## 2019-09-10 RX ORDER — LIDOCAINE HYDROCHLORIDE 10 MG/ML
4 INJECTION, SOLUTION INFILTRATION; PERINEURAL
Status: COMPLETED | OUTPATIENT
Start: 2019-09-10 | End: 2019-09-10

## 2019-09-10 RX ORDER — METHYLPREDNISOLONE ACETATE 40 MG/ML
1 INJECTION, SUSPENSION INTRA-ARTICULAR; INTRALESIONAL; INTRAMUSCULAR; SOFT TISSUE
Status: COMPLETED | OUTPATIENT
Start: 2019-09-10 | End: 2019-09-10

## 2019-09-10 RX ADMIN — LIDOCAINE HYDROCHLORIDE 4 ML: 10 INJECTION, SOLUTION INFILTRATION; PERINEURAL at 08:15

## 2019-09-10 RX ADMIN — METHYLPREDNISOLONE ACETATE 1 ML: 40 INJECTION, SUSPENSION INTRA-ARTICULAR; INTRALESIONAL; INTRAMUSCULAR; SOFT TISSUE at 08:15

## 2019-09-10 NOTE — LETTER
September 10, 2019     Patient: Gabe Roth  YOB: 1955   Date of Visit: 9/10/2019       To Whom it May Concern:    Lorene Urbina is under my professional care  He was seen in my office on 9/10/2019  He is unable to return to work until 9/16  At this time he may return to work as tolerated  If you have any questions or concerns, please don't hesitate to call           Sincerely,          Vitaly Mir MD        CC: No Recipients

## 2019-09-10 NOTE — PROGRESS NOTES
Patient Name:  Lucian Goldman  MRN:  [id number]    Assessment & Plan     Left Knee pain, Effusion, possible gout vs internal derangement    1  Will aspirate and provide patient with cortisone injection into his left knee today  This is documented below  The fluid collection will be sent out for possible gout  He will be contacted with the results  2  Patient will remain out of work until 9/16  A note was provided today  He was instructed to call office if he feels as though he cannot return to work on this date and schedule a follow-up visit  3  May perform activities as tolerated  Avoid painful maneuvers  4  May take OTC anti-inflammatories and ice prn pain relief  5  Patient to call Friday 9/13 for laboratory results  Will determine follow-up plan at that time  Chief Complaint     Left Knee Pain      History of the Present Illness     Lucian Goldman  is a 59 y o  male presents today for an orthopedic consultation visit from his PCP (Dr Ysabel Frank) on 9/3/2019 for his left knee  Patient does not note any certain mechanism of injury  Patient states that he was seen by his PCP recently who provided him with a Medrol Dosepak, which provided him with relief of his symptoms/swelling but they are beginning to return again as he is weaning off the medication  He notes a constant dull, aching sensation diffusely about the knee  He states that his symptoms are worse with prolonged ambulation and standing  He states that his PCP ordered blood work of a Lyme titer, rheumatoid arthritis, SED rate, CBC and CRP all which were normal   He  Denies numbness and tingling, fever, chills  Physical Exam     BP (!) 160/105   Pulse 72   Ht 5' 7 5" (1 715 m)   Wt 96 2 kg (212 lb)   BMI 32 71 kg/m²     Left  knee:  Soft tissue swelling: None   Skin is intact  No erythema or ecchymosis  Effusion: Mild  Tenderness to palpation: None  Range of motion:  Extension: 0  Flexion: 90  Lachman test: Stable  Valgus stress: Stable  Varus stress: Stable  Posterior drawer test: Stable  Arun's test: Negative  Patellar grind test: Negative      Eyes: Anicteric sclerae  Neck: Supple  Lungs: Unlabored breathing  Cardiovascular: Capillary refill is less than 2 seconds  Skin: Intact without erythema  Neurologic: Sensation intact to light touch  Psychiatric: Mood and affect are appropriate  Data Review     I have personally reviewed pertinent films in PACS, and my interpretation follows:    X Ray Left Knee performed on 9/3/19:  Mild narrowing of the patellofemoral joint  No other acute osseous abnormality      Large joint arthrocentesis: L knee  Date/Time: 9/10/2019 8:15 AM  Consent given by: patient  Site marked: site marked  Timeout: Immediately prior to procedure a time out was called to verify the correct patient, procedure, equipment, support staff and site/side marked as required   Supporting Documentation  Indications: pain and diagnostic evaluation   Procedure Details  Location: knee - L knee  Preparation: Patient was prepped and draped in the usual sterile fashion  Needle size: 18 G  Ultrasound guidance: no  Approach: lateral  Medications administered: 1 mL methylPREDNISolone acetate 40 mg/mL; 4 mL lidocaine 1 %    Aspirate amount: 15 mL  Aspirate: yellow and clear  Analysis: fluid sample sent for laboratory analysis    Patient tolerance: patient tolerated the procedure well with no immediate complications  Dressing:  Sterile dressing applied          Past Medical History:   Diagnosis Date    Abnormal ECG     Last Assessed 2/29/2016    CAP (community acquired pneumonia) 10/12/2016       Past Surgical History:   Procedure Laterality Date    INGUINAL HERNIA REPAIR         No Known Allergies    Current Outpatient Medications on File Prior to Visit   Medication Sig Dispense Refill    omeprazole (PriLOSEC) 40 MG capsule Take 1 capsule (40 mg total) by mouth daily before breakfast 30 capsule 1    predniSONE 20 mg tablet Take 1 tablet (20 mg total) by mouth 2 (two) times a day with meals For 5 days then 1 a day 20 tablet 0    [DISCONTINUED] doxycycline hyclate (VIBRAMYCIN) 100 mg capsule Take 1 capsule (100 mg total) by mouth every 12 (twelve) hours for 21 days (Patient not taking: Reported on 9/10/2019) 42 capsule 0    [DISCONTINUED] HYDROcodone-acetaminophen (NORCO) 5-325 mg per tablet Take 1 tablet by mouth every 6 (six) hours as needed for painMax Daily Amount: 4 tablets (Patient not taking: Reported on 9/10/2019) 20 tablet 0     No current facility-administered medications on file prior to visit  Social History     Tobacco Use    Smoking status: Former Smoker    Smokeless tobacco: Current User   Substance Use Topics    Alcohol use: Yes    Drug use: No       Family History   Problem Relation Age of Onset    Cancer Family     Colon cancer Father     COPD Father        Review of Systems     As stated in the HPI  All other systems were reviewed and are negative        Scribe Attestation    I,:   Olga Lidia Crespo am acting as a scribe while in the presence of the attending physician :        I,:   Karen Villanueva MD personally performed the services described in this documentation    as scribed in my presence :

## 2019-09-13 ENCOUNTER — TELEPHONE (OUTPATIENT)
Dept: OBGYN CLINIC | Facility: CLINIC | Age: 64
End: 2019-09-13

## 2019-09-13 DIAGNOSIS — M25.462 EFFUSION OF LEFT KNEE: Primary | ICD-10-CM

## 2019-09-13 LAB
BACTERIA SPEC BFLD CULT: NO GROWTH
GRAM STN SPEC: NORMAL

## 2019-09-13 NOTE — TELEPHONE ENCOUNTER
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 Please call [phone] or [phone]    Thank you

## 2019-09-13 NOTE — TELEPHONE ENCOUNTER
I spoke with patient and advised above  Wife states he has an appt on Tuesday for follow up  He will need an out of work note at that time  I got cheo of [facility] and they are going to run the lab  They will let us know if there is not enough specimen to run the testing  Thanks

## 2019-09-13 NOTE — TELEPHONE ENCOUNTER
I spoke with the patient and he started taking ibuprofen 800mg overnight after having increased pain  He stated that he was afraid to take NSAID with his prednisone dosage? He is currently taking 10mg QD, is he okay to include ibuprofen 800 Tid with food? Advised he should ice and elevate  His WBC are elevated in the synovial fluid  He has a recent history of lyme  Can the synovial fluid be tested for Lyme?

## 2019-09-13 NOTE — TELEPHONE ENCOUNTER
I ordered Lyme PCR to be added on to the synovial fluid form 9/10 but I am not sure if the lab still has sample to send for this test  If not and he re-accumulates he may be able to get re-aspirated and send off that sample  His western blot did not meet that lab's criteria for positive western blot, his PCP did go over that lab for him  He should continue schedule follow-up with his PCP regarding concerns for Lyme disease otherwise  He may take ibuprofen 800 mg t i d   Along with the prednisone

## 2019-09-16 NOTE — TELEPHONE ENCOUNTER
Patient's wife calling to see if he will be seen for his appt tomorrow at 8:45am   Patient's knee was drained for labwork and checking for lyme's testing, she was told that we would get back to her if his appt needed to be changed  Please advise      Callback WW#[phone]

## 2019-09-17 ENCOUNTER — OFFICE VISIT (OUTPATIENT)
Dept: OBGYN CLINIC | Facility: CLINIC | Age: 64
End: 2019-09-17
Payer: COMMERCIAL

## 2019-09-17 VITALS
BODY MASS INDEX: 33.27 KG/M2 | DIASTOLIC BLOOD PRESSURE: 97 MMHG | WEIGHT: 212 LBS | SYSTOLIC BLOOD PRESSURE: 149 MMHG | HEART RATE: 64 BPM | HEIGHT: 67 IN

## 2019-09-17 DIAGNOSIS — M25.562 ACUTE PAIN OF LEFT KNEE: Primary | ICD-10-CM

## 2019-09-17 LAB — B BURGDOR DNA SPEC QL NAA+PROBE: NEGATIVE

## 2019-09-17 PROCEDURE — 99213 OFFICE O/P EST LOW 20 MIN: CPT | Performed by: ORTHOPAEDIC SURGERY

## 2019-09-17 RX ORDER — MELOXICAM 15 MG/1
15 TABLET ORAL DAILY
Qty: 30 TABLET | Refills: 0 | Status: SHIPPED | OUTPATIENT
Start: 2019-09-17 | End: 2019-10-03

## 2019-09-17 NOTE — PROGRESS NOTES
Patient Name:  Jolanta Richter  MRN:  [id number]    Assessment & Plan     Left knee pain, possible meniscal pathology  1  MRI left knee to evaluate meniscus  2  Prescribed meloxicam 15 mg daily  3  Reviewed synovial fluid aspirate results, which show mild elevation of white blood cell count consistent with nonspecific inflammation  4  Follow-up after MRI  Subjective     Patient returns today reporting recurrent severe pain in his left knee with associated swelling  Symptoms are worse with weight-bearing  He also has increased pain at night  He reports temporary relief after a steroid injection on 9/10/19  He has been taking OTC NSAIDs as well  He states that his right shoulder is starting to bother him as well  General ROS:  Negative for fever or chills  Neurological ROS:  Negative for numbness or tingling  Objective     /97   Pulse 64   Ht 5' 7" (1 702 m)   Wt 96 2 kg (212 lb)   BMI 33 20 kg/m²     Left knee:  Small effusion  Medial joint line tenderness  Range of motion is extension 0° and flexion 120° limited by pain  Stable with varus and valgus stress  Arun's test is positive  No lower extremity edema  Psychiatric: Mood and affect are appropriate  Data Review     I have personally reviewed pertinent lab studies, and my interpretation follows  Left knee synovial fluid 9/10/19:  WBC: 5,305  Crystals: None  Culture: No growth    Lyme PCR 9/10/19: Negative      Social History     Tobacco Use    Smoking status: Former Smoker    Smokeless tobacco: Current User   Substance Use Topics    Alcohol use: Yes    Drug use:  No

## 2019-09-23 ENCOUNTER — TELEPHONE (OUTPATIENT)
Dept: OBGYN CLINIC | Facility: HOSPITAL | Age: 64
End: 2019-09-23

## 2019-09-23 NOTE — TELEPHONE ENCOUNTER
I spoke with patient and advised he should check in with PCP for increase in swelling to rule out DVT  He has his MRI scheduled for Wed  He has not seen any improvement from the meloxicam   Advised he should give it a few more days to kick in  Tylenol 1000mg TID  Ice and elevation  Patient verbalized understanding

## 2019-09-23 NOTE — TELEPHONE ENCOUNTER
Patient's wife, Martinez Jaimes stating that the patient's swelling is now going down his leg, into his foot  Christopher Don would like someone to call her back as soon as possible    Please advise

## 2019-09-25 ENCOUNTER — HOSPITAL ENCOUNTER (OUTPATIENT)
Dept: RADIOLOGY | Age: 64
Discharge: HOME | End: 2019-09-25
Payer: COMMERCIAL

## 2019-09-25 DIAGNOSIS — M25.562 ACUTE PAIN OF LEFT KNEE: ICD-10-CM

## 2019-09-25 PROCEDURE — 73721 MRI JNT OF LWR EXTRE W/O DYE: CPT

## 2019-09-26 ENCOUNTER — OFFICE VISIT (OUTPATIENT)
Dept: FAMILY MEDICINE CLINIC | Facility: CLINIC | Age: 64
End: 2019-09-26
Payer: COMMERCIAL

## 2019-09-26 VITALS
TEMPERATURE: 98.4 F | BODY MASS INDEX: 34.06 KG/M2 | WEIGHT: 217 LBS | SYSTOLIC BLOOD PRESSURE: 132 MMHG | OXYGEN SATURATION: 97 % | HEIGHT: 67 IN | DIASTOLIC BLOOD PRESSURE: 90 MMHG | HEART RATE: 91 BPM

## 2019-09-26 DIAGNOSIS — M79.89 CALF SWELLING: ICD-10-CM

## 2019-09-26 DIAGNOSIS — M12.811 ROTATOR CUFF ARTHROPATHY OF RIGHT SHOULDER: Primary | ICD-10-CM

## 2019-09-26 PROCEDURE — 99214 OFFICE O/P EST MOD 30 MIN: CPT | Performed by: FAMILY MEDICINE

## 2019-09-26 RX ORDER — HYDROCODONE BITARTRATE AND ACETAMINOPHEN 5; 325 MG/1; MG/1
1 TABLET ORAL EVERY 6 HOURS PRN
Qty: 30 TABLET | Refills: 0 | Status: SHIPPED | OUTPATIENT
Start: 2019-09-26 | End: 2019-12-31 | Stop reason: ALTCHOICE

## 2019-09-26 RX ORDER — PREDNISONE 10 MG/1
10 TABLET ORAL DAILY
Qty: 22 TABLET | Refills: 0 | Status: SHIPPED | OUTPATIENT
Start: 2019-09-26 | End: 2019-10-04 | Stop reason: ALTCHOICE

## 2019-09-26 NOTE — ASSESSMENT & PLAN NOTE
Patient has some left lower extremity edema  Etiology is unclear  I do not believe that he has DVT based on history and examination but I do feel be prudent to get a noninvasive study and we asked him to do this and gave him request for same  This may be related to his acute knee complaints  He did see Orthopedic surgery  Synovial analysis was nonspecific  No evidence of gout or pseudogout  No evidence of infectious process or Lyme  Prednisone taper worked before and we are going to have him try this again for now  Again he is given some limited Vicodin  I believe he will require follow-up with Orthopedic surgery but he is going to call with report next week and will follow up on results of his ultrasound are available  He and wife were in agreement

## 2019-09-26 NOTE — ASSESSMENT & PLAN NOTE
I believe his right shoulder symptoms are on the basis of degenerative rotator cuff arthropathy on the right  He is going to use ice and rest   We are going to give him a prednisone taper  We did give him a small supply of Vicodin to be used as needed, especially at night  We did give him warnings as to sedation, operating machinery X Carmen Santos  He is in agreement with above  He is going to call with report of his condition next week or sooner as needed  He agrees

## 2019-10-02 ENCOUNTER — OFFICE VISIT (OUTPATIENT)
Dept: OBGYN CLINIC | Facility: CLINIC | Age: 64
End: 2019-10-02
Payer: COMMERCIAL

## 2019-10-02 VITALS
WEIGHT: 217 LBS | HEIGHT: 67 IN | DIASTOLIC BLOOD PRESSURE: 98 MMHG | SYSTOLIC BLOOD PRESSURE: 151 MMHG | BODY MASS INDEX: 34.06 KG/M2 | HEART RATE: 92 BPM

## 2019-10-02 DIAGNOSIS — M25.50 POLYARTHRALGIA: ICD-10-CM

## 2019-10-02 DIAGNOSIS — M25.562 ACUTE PAIN OF LEFT KNEE: Primary | ICD-10-CM

## 2019-10-02 PROCEDURE — 99213 OFFICE O/P EST LOW 20 MIN: CPT | Performed by: ORTHOPAEDIC SURGERY

## 2019-10-02 NOTE — PROGRESS NOTES
Assessment and Plan: This is a 59year old male with PMH of hyperlipidemia, GERD, and an "inflammatory polyarthritis" presenting for initial rheumatology consultation for acute left knee pain and polyarthralgia, referred by Dr Annie Bridges, his orthopedic  The patient reports a several month history of left knee pain with swelling which occurred without injury  He was evaluated by Dr Annie Bridges who performed left knee joint aspiration revealing an elevated white blood cell count  Also most recent MR revealed small joint effusion and subcutaneous edema prompting referral   Patient reports that 5 years ago, he did follow with Dr Merlin Pratt and was diagnosed with an inflammatory polyarthritis  He was treated with Plaquenil however this resulted in GI symptoms and was ultimately stopped  He was then treated with diclofenac and has been doing well over the last few years not requiring further follow up with rheumatology  Most recently, he has been experiencing increased right shoulder pain in addition to left knee pain  He now describes swelling affecting not only left knee but into the left lower extremity from the calf into the foot  He denies any further features of inflammatory arthritis such as other swollen joints or prolonged morning stiffness  He has however had significant improvement with courses of prednisone and he is currently ulitizing 15 mg daily  His exam today does not reveal any evidence of inflammatory arthritis however he is on Prednisone  There is nonspecific, soft tissue swelling affecting the lower extremity, specifically of the left knee, left calf, and into the left foot and ankle  He does have tenderness of the left calf  He does have 2+ pitting edema of the left and 1+ pitting edema of the right lower extremity  There is no evidence of synovitis or dactylitis      Patient's history and physical examination along with recent left knee aspiration revealing WBC of 5,000, are suggestive an inflammatory condition  Patient also has a significant family history of autoimmune conditions which would warrant further evaluation along with personal history of possible psoriasis affecting the scalp although he has never followed with Dermatology in the past to confirm this diagnosis  Chart review reveals +ESTEFANÍA, 1:80 in 2015 with Dr Paula Mak but all other serologies were negative  For now, I did ask him to obtain additional autoimmune serologies along with imaging  I advised him to complete the left LE Doppler to rule out DVT ordered by Dr Humble Muñoz given his presentation today  He will remain on his current dose of prednisone and return to the office in 2 months time for re-evaluation with Dr Taryn Jacques  He will however contact the office if he has any further questions or concerns or would like to review lab results over the phone before his next appt  Plan:  Diagnoses and all orders for this visit:    Malaise and fatigue  -     ESTEFANÍA Screen w/ Reflex to Titer/Pattern  -     Chronic Hepatitis Panel  -     C-reactive protein  -     Cyclic citrul peptide antibody, IgG  -     HIV 1/2 Antigen/Antibody,Fourth Generation W/Rfl (Historical)  -     HLA-B27 antigen  -     Sedimentation rate, automated  -     Sjogren's Antibodies  -     TSH, 3rd generation with Free T4 reflex  -     Vitamin D 25 hydroxy  -     Quantiferon TB Gold Plus; Future  -     RPR; Future  -     Chlamydia/GC amplified DNA by PCR; Future  -     XR hand 3+ vw right; Future  -     XR hand 3+ vw left; Future  -     XR foot 3+ vw right; Future  -     XR foot 3+ vw left; Future  -     XR sacroiliac joints 3+ views;  Future  -     Uric acid  -     CBC and differential    Acute pain of left knee  -     Ambulatory referral to Rheumatology  -     ESTEFANÍA Screen w/ Reflex to Titer/Pattern  -     Chronic Hepatitis Panel  -     C-reactive protein  -     Cyclic citrul peptide antibody, IgG  -     HIV 1/2 Antigen/Antibody,Fourth Generation W/Rfl (Historical)  - HLA-B27 antigen  -     Sedimentation rate, automated  -     Sjogren's Antibodies  -     TSH, 3rd generation with Free T4 reflex  -     Vitamin D 25 hydroxy  -     Quantiferon TB Gold Plus; Future  -     RPR; Future  -     Chlamydia/GC amplified DNA by PCR; Future  -     XR hand 3+ vw right; Future  -     XR hand 3+ vw left; Future  -     XR foot 3+ vw right; Future  -     XR foot 3+ vw left; Future  -     XR sacroiliac joints 3+ views; Future  -     Uric acid  -     CBC and differential    Polyarthralgia  -     Ambulatory referral to Rheumatology  -     ESTEFANÍA Screen w/ Reflex to Titer/Pattern  -     Chronic Hepatitis Panel  -     C-reactive protein  -     Cyclic citrul peptide antibody, IgG  -     HIV 1/2 Antigen/Antibody,Fourth Generation W/Rfl (Historical)  -     HLA-B27 antigen  -     Sedimentation rate, automated  -     Sjogren's Antibodies  -     TSH, 3rd generation with Free T4 reflex  -     Vitamin D 25 hydroxy  -     Quantiferon TB Gold Plus; Future  -     RPR; Future  -     Chlamydia/GC amplified DNA by PCR; Future  -     XR hand 3+ vw right; Future  -     XR hand 3+ vw left; Future  -     XR foot 3+ vw right; Future  -     XR foot 3+ vw left; Future  -     XR sacroiliac joints 3+ views; Future  -     Uric acid  -     CBC and differential    Chronic bilateral low back pain without sciatica  -     ESTEFANÍA Screen w/ Reflex to Titer/Pattern  -     Chronic Hepatitis Panel  -     C-reactive protein  -     Cyclic citrul peptide antibody, IgG  -     HIV 1/2 Antigen/Antibody,Fourth Generation W/Rfl (Historical)  -     HLA-B27 antigen  -     Sedimentation rate, automated  -     Sjogren's Antibodies  -     TSH, 3rd generation with Free T4 reflex  -     Vitamin D 25 hydroxy  -     Quantiferon TB Gold Plus; Future  -     RPR; Future  -     Chlamydia/GC amplified DNA by PCR; Future  -     XR hand 3+ vw right; Future  -     XR hand 3+ vw left; Future  -     XR foot 3+ vw right; Future  -     XR foot 3+ vw left;  Future  - XR sacroiliac joints 3+ views; Future  -     Uric acid  -     CBC and differential        Follow-up plan: F/U with Dr Kinza Vasques in 2 months  Dwayne Elmore Sr  is a 59 y o   male who presents  initial rheumatology evaluation for acute left knee pain/polyarthralgia  Early summer started with left knee pain/swelling without injury  Gradual onset  Saw PCP initially and placed on Prednisone  Improvement of symptoms  Started tapering Prednisone and symptoms returned  Sent for XR and referred to Dr Hawk Francisco, who aspirated the knee x 2  Last week sent for MR  Recommended evaluation with rheum  Last week started with swelling developing along left LE, not isolated to a joint  No pain   Five years ago dx with "autoimmune disease", Dr Sudarshan Sosa   Initially with hands, shoulders, and feet at that time  Started on HCQ, but stopped after 5 days due to diarrhea  Then was on diclofenac with improvement but stopped after a year  Started with right shoulder pain a few weeks ago again  Some discomfort in the hands and feet, intermittently  No other joint swelling  +AM stiffness x few minutes with activity  +Difficulty with sleep, unchanged  +Some fatigue  Currently on prednisone 15 mg daily  No OTC medications currently  Occasional low back pain intermittently for years  Mother with autoimmune disease "myositis"  Daughter with autoimmune diseases- "lupus of the skin", "vasculitis", and "eosinophil" issue  Thinks he has PsO on the scalp  Never seen derm  No IBD  No uveitis, dactylitis, enthesitis(occasional left epicondylitis), oral/nasal ulcers, rashes, alopecia, raynauds  Review of Systems  Review of Systems   Constitutional: Positive for fatigue  Negative for appetite change, chills, fever and unexpected weight change  HENT: Negative for congestion, mouth sores and sore throat  No recent infxn    Eyes: Negative for pain, redness and visual disturbance  No sicca sx  No history of uveitis    Respiratory: Positive for cough  Negative for chest tightness and shortness of breath  Cardiovascular: Negative for chest pain and leg swelling  Gastrointestinal: Negative for abdominal pain, blood in stool, constipation, diarrhea, nausea and vomiting  Endocrine: Negative for polydipsia and polyuria  Genitourinary: Negative for frequency and hematuria  Skin: Negative for color change and rash  No rashes, hair loss, or nail changes  No raynauds  Neurological: Negative for weakness, light-headedness, numbness and headaches  Hematological: Negative for adenopathy  Psychiatric/Behavioral: Negative for behavioral problems  The patient is not nervous/anxious  Allergies  No Known Allergies    Home Medications    Current Outpatient Medications:     HYDROcodone-acetaminophen (NORCO) 5-325 mg per tablet, Take 1 tablet by mouth every 6 (six) hours as needed for painMax Daily Amount: 4 tablets, Disp: 30 tablet, Rfl: 0    meloxicam (MOBIC) 15 mg tablet, Take 1 tablet (15 mg total) by mouth daily, Disp: 30 tablet, Rfl: 0    omeprazole (PriLOSEC) 40 MG capsule, Take 1 capsule (40 mg total) by mouth daily before breakfast, Disp: 30 capsule, Rfl: 1    predniSONE 10 mg tablet, Take 1 tablet (10 mg total) by mouth daily 4 daily for 2 days then 3 daily for 2 days then 2 daily for 2 days then 1 daily for 4 days, Disp: 22 tablet, Rfl: 0    Past Medical History  Past Medical History:   Diagnosis Date    Abnormal ECG     Last Assessed 2/29/2016    CAP (community acquired pneumonia) 10/12/2016       Past Surgical History   Past Surgical History:   Procedure Laterality Date    INGUINAL HERNIA REPAIR         Family History  No known family history of autoimmune or inflammatory diseases    Family History   Problem Relation Age of Onset   Nette Crystal Cancer Family     Colon cancer Father     COPD Father        Social History  Occupation:   Social History     Substance and Sexual Activity Alcohol Use Yes     Social History     Substance and Sexual Activity   Drug Use No     Social History     Tobacco Use   Smoking Status Former Smoker   Smokeless Tobacco Current User       Objective:    Vitals:    10/03/19 0953   BP: 152/94   Pulse: 88   Weight: 98 4 kg (217 lb)   Height: 5' 7" (1 702 m)       Physical Exam   Constitutional: He is oriented to person, place, and time  He appears well-developed and well-nourished  HENT:   Head: Normocephalic  Mouth/Throat: Oropharynx is clear and moist    Eyes: Pupils are equal, round, and reactive to light  Conjunctivae are normal    Neck: Normal range of motion  Neck supple  Cardiovascular: Normal rate, regular rhythm and normal heart sounds  Pulmonary/Chest: Effort normal and breath sounds normal    Musculoskeletal:   Mild joint tenderness of the joints of hands  No synovitis however there is increased warmth of both shoulders with tenderness  Restricted ROM of the hips B/L with internal and external rotation  +Crepitus of the knees  +Tenderness of the left knee with soft tissue swelling affecting LLE including the left knee, calf, ankle, and dorsal foot  +Calf tenderness on the left  Neurological: He is alert and oriented to person, place, and time  Skin: Skin is warm and dry  Psychiatric: He has a normal mood and affect  His behavior is normal        Imaging:   MRI Left Knee 9/25/2019  IMPRESSION:  Suboptimal study due to image degradation of multiple imaging sequences by motion artifact          1  No discrete meniscal tear within limitations of motion artifact      2   Grade 1 MCL sprain      3   Small knee joint effusion      4    Diffuse subcutaneous edema        Labs:   Component      Latest Ref Rng & Units 8/19/2019   Lyme Disease Ab (IgG), Blot      NEGATIVE NEGATIVE   LYME 18 KD IGG       REACTIVE (A)   LYME 23 KD IGG       REACTIVE (A)   LYME 28 KD IGG       NON-REACTIVE   LYME 30 KD IGG       NON-REACTIVE   LYME 39 KD IGG NON-REACTIVE   LYME 41 KD IGG       NON-REACTIVE   LYME 45 KD IGG       NON-REACTIVE   LYME 58 KD IGG       NON-REACTIVE   LYME 66 KD IGG       NON-REACTIVE   LYME 93 KD IGG       NON-REACTIVE   Lyme Disease Ab (IgM), Blot      NEGATIVE NEGATIVE   LYME 23 KD IGM       REACTIVE (A)   LYME 39 KD IGM       NON-REACTIVE   LYME 41 KD IGM       NON-REACTIVE   White Blood Cell Count      3 8 - 10 8 Thousand/uL 9 2   Red Blood Cell Count      4 20 - 5 80 Million/uL 5 00   Hemoglobin      13 2 - 17 1 g/dL 14 6   HCT      38 5 - 50 0 % 44 4   MCV      80 0 - 100 0 fL 88 8   MCH      27 0 - 33 0 pg 29 2   MCHC  32 0 - 36 0 g/dL 32 9   RDW      11 0 - 15 0 % 13 5   Platelet Count      125 - 400 Thousand/uL 246   SL AMB MPV      7 5 - 12 5 fL 11 1   Total Counted          Neutrophil % Synovial      %    Lymph % Synovial      %    Monocyte % Synovial      %    HEPATITIS C ANTIBODY      NON-REACTIVE NON-REACTIVE   SIGNAL TO CUT-OFF      <1 00 0 01   Lyme Ab Screen      index 1 02 (H)   C-Reactive Protein, Quant      <8 0 mg/L 11 2 (H)   RHEUMATOID FACTOR      <14 IU/mL <14   Sed Rate      < OR = 20 mm/h 2   WBC, Fluid      0 - 200 /ul    SYNOVIAL CRYSTALS      No Crystals Seen    LYME DISEASE(B BURGDORFERI)PCR      Negative      Component      Latest Ref Rng & Units 9/10/2019   Lyme Disease Ab (IgG), Blot      NEGATIVE    LYME 18 KD IGG          LYME 23 KD IGG          LYME 28 KD IGG          LYME 30 KD IGG          LYME 39 KD IGG          LYME 41 KD IGG          LYME 45 KD IGG          LYME 58 KD IGG          LYME 66 KD IGG          LYME 93 KD IGG          Lyme Disease Ab (IgM), Blot      NEGATIVE    LYME 23 KD IGM          LYME 39 KD IGM          LYME 41 KD IGM          White Blood Cell Count      3 8 - 10 8 Thousand/uL    Red Blood Cell Count      4 20 - 5 80 Million/uL    Hemoglobin      13 2 - 17 1 g/dL    HCT      38 5 - 50 0 %    MCV      80 0 - 100 0 fL    MCH      27 0 - 33 0 pg    MCHC        32 0 - 36 0 g/dL RDW      11 0 - 15 0 %    Platelet Count      777 - 400 Thousand/uL    SL AMB MPV      7 5 - 12 5 fL    Total Counted       100   Neutrophil % Synovial      % 26   Lymph % Synovial      % 50   Monocyte % Synovial      % 24   HEPATITIS C ANTIBODY      NON-REACTIVE    SIGNAL TO CUT-OFF      <1 00    Lyme Ab Screen      index    C-Reactive Protein, Quant      <8 0 mg/L    RHEUMATOID FACTOR      <14 IU/mL    Sed Rate      < OR = 20 mm/h    WBC, Fluid      0 - 200 /ul 5,305 (H)   SYNOVIAL CRYSTALS      No Crystals Seen No Crystals Seen   LYME DISEASE(B BURGDORFERI)PCR      Negative Negative

## 2019-10-02 NOTE — PROGRESS NOTES
Patient Name:  Tim Anthony  MRN:  [id number]    Assessment & Plan     Left knee pain and swelling, polyarthralgias  1  No structural abnormalities to account for patient's ongoing pain  2  Recommend evaluation by Rheumatology and defer to rheumatologist for further treatment  3  Work note provided  Patient will obtain any additional work notes from his PCP as no further orthopedic follow-up is warranted at this time  Subjective     71-year-old male returns to the office today for follow-up regarding his left knee  He recently underwent an MRI and is here to review the results  He still notes persistent pain in the left knee with associated swelling  He notes swelling distally into the left foot  He also notes shoulder pain as well  He did recently see his primary care physician who prescribed prednisone and notes improvement after initiating prednisone  He notes pain with sitting  He denies any weakness or instability  No numbness or tingling  No fevers or chills  General ROS:  Negative for fever or chills  Neurological ROS:  Negative for numbness or tingling  Objective     /98   Pulse 92   Ht 5' 7" (1 702 m)   Wt 98 4 kg (217 lb)   BMI 33 99 kg/m²       Counseling     The patient was counseled regarding diagnostic results, impressions, patient/family education, instructions for management, risks and benefits of treatment options, and prognosis  The total time of the encounter was 15 minutes, and more than 50% of that time was spent in counseling and coordination of care  Data Review     I have personally reviewed pertinent films in PACS, and my interpretation follows  MRI left knee 9/25/19 reveals no evidence of meniscal tear  Grade 1 MCL sprain  Small joint effusion noted  Diffuse subcutaneous edema  Social History     Tobacco Use    Smoking status: Former Smoker    Smokeless tobacco: Current User   Substance Use Topics    Alcohol use:  Yes    Drug use: No       Scribe Attestation    I,:   Lucrecia Erwin PA-C am acting as a scribe while in the presence of the attending physician :        I,:   Feliz Clarke MD personally performed the services described in this documentation    as scribed in my presence :

## 2019-10-03 ENCOUNTER — HOSPITAL ENCOUNTER (OUTPATIENT)
Dept: RADIOLOGY | Facility: HOSPITAL | Age: 64
Discharge: HOME | End: 2019-10-03
Payer: COMMERCIAL

## 2019-10-03 ENCOUNTER — OFFICE VISIT (OUTPATIENT)
Dept: RHEUMATOLOGY | Facility: CLINIC | Age: 64
End: 2019-10-03
Payer: COMMERCIAL

## 2019-10-03 VITALS
WEIGHT: 217 LBS | SYSTOLIC BLOOD PRESSURE: 152 MMHG | HEIGHT: 67 IN | HEART RATE: 88 BPM | BODY MASS INDEX: 34.06 KG/M2 | DIASTOLIC BLOOD PRESSURE: 94 MMHG

## 2019-10-03 DIAGNOSIS — M54.50 CHRONIC BILATERAL LOW BACK PAIN WITHOUT SCIATICA: ICD-10-CM

## 2019-10-03 DIAGNOSIS — M25.50 POLYARTHRALGIA: ICD-10-CM

## 2019-10-03 DIAGNOSIS — R53.81 MALAISE AND FATIGUE: Primary | ICD-10-CM

## 2019-10-03 DIAGNOSIS — G89.29 CHRONIC BILATERAL LOW BACK PAIN WITHOUT SCIATICA: ICD-10-CM

## 2019-10-03 DIAGNOSIS — R53.81 MALAISE AND FATIGUE: ICD-10-CM

## 2019-10-03 DIAGNOSIS — M25.562 ACUTE PAIN OF LEFT KNEE: ICD-10-CM

## 2019-10-03 DIAGNOSIS — R53.83 MALAISE AND FATIGUE: Primary | ICD-10-CM

## 2019-10-03 DIAGNOSIS — R53.83 MALAISE AND FATIGUE: ICD-10-CM

## 2019-10-03 PROCEDURE — 73630 X-RAY EXAM OF FOOT: CPT

## 2019-10-03 PROCEDURE — 99204 OFFICE O/P NEW MOD 45 MIN: CPT | Performed by: PHYSICIAN ASSISTANT

## 2019-10-03 PROCEDURE — 73130 X-RAY EXAM OF HAND: CPT

## 2019-10-03 PROCEDURE — 72200 X-RAY EXAM SI JOINTS: CPT

## 2019-10-04 ENCOUNTER — TELEPHONE (OUTPATIENT)
Dept: OBGYN CLINIC | Facility: HOSPITAL | Age: 64
End: 2019-10-04

## 2019-10-04 DIAGNOSIS — M25.50 POLYARTHRALGIA: Primary | ICD-10-CM

## 2019-10-04 RX ORDER — PREDNISONE 1 MG/1
15 TABLET ORAL DAILY
Qty: 90 TABLET | Refills: 1 | Status: SHIPPED | OUTPATIENT
Start: 2019-10-04 | End: 2019-12-03 | Stop reason: SDUPTHER

## 2019-10-04 NOTE — TELEPHONE ENCOUNTER
Not sure what your plan was with the prednisone, looks like he came to us on 15mg? Wasn't sure if you had discussed tapering it or going on lower dose?

## 2019-10-07 ENCOUNTER — HOSPITAL ENCOUNTER (OUTPATIENT)
Dept: ULTRASOUND IMAGING | Facility: HOSPITAL | Age: 64
Discharge: HOME | End: 2019-10-07
Payer: COMMERCIAL

## 2019-10-07 DIAGNOSIS — M79.89 CALF SWELLING: ICD-10-CM

## 2019-10-07 PROCEDURE — 93971 EXTREMITY STUDY: CPT

## 2019-10-07 PROCEDURE — 93971 EXTREMITY STUDY: CPT | Performed by: SURGERY

## 2019-10-08 DIAGNOSIS — E55.9 VITAMIN D DEFICIENCY: Primary | ICD-10-CM

## 2019-10-08 LAB
25(OH)D3 SERPL-MCNC: 18 NG/ML (ref 30–100)
ANA SER QL IF: NEGATIVE
C TRACH RRNA SPEC QL NAA+PROBE: NOT DETECTED
CCP IGG SERPL-ACNC: <16 UNITS
CRP SERPL-MCNC: 5.7 MG/L
ENA SS-A AB SER IA-ACNC: NORMAL AI
ENA SS-B AB SER IA-ACNC: NORMAL AI
ERYTHROCYTE [SEDIMENTATION RATE] IN BLOOD BY WESTERGREN METHOD: 31 MM/H
HAV IGM SERPL QL IA: NORMAL
HBV CORE IGM SERPL QL IA: NORMAL
HBV SURFACE AG SERPL QL IA: NORMAL
HCV AB S/CO SERPL IA: 0.01
HCV AB SERPL QL IA: NORMAL
HIV 1+2 AB+HIV1 P24 AG SERPL QL IA: NORMAL
HLA-B27 QL FC: NEGATIVE
M TB IFN-G CD4+ BCKGRND COR BLD-ACNC: 0 IU/ML
M TB IFN-G CD4+ BCKGRND COR BLD-ACNC: 0 IU/ML
M TB IFN-G CD4+ T-CELLS BLD-ACNC: 0.02 IU/ML
M TB TUBERC IFN-G BLD QL: NEGATIVE
M TB TUBERC IGNF/MITOGEN IGNF CONTROL: >10 IU/ML
N GONORRHOEA RRNA SPEC QL NAA+PROBE: NOT DETECTED
RPR SER QL: NORMAL
TSH SERPL-ACNC: 2.56 MIU/L (ref 0.4–4.5)
URATE SERPL-MCNC: 6.7 MG/DL (ref 4–8)

## 2019-10-08 RX ORDER — ERGOCALCIFEROL 1.25 MG/1
50000 CAPSULE ORAL WEEKLY
Qty: 12 CAPSULE | Refills: 0 | Status: SHIPPED | OUTPATIENT
Start: 2019-10-08 | End: 2022-04-13 | Stop reason: CLARIF

## 2019-11-22 ENCOUNTER — OFFICE VISIT (OUTPATIENT)
Dept: FAMILY MEDICINE CLINIC | Facility: CLINIC | Age: 64
End: 2019-11-22
Payer: COMMERCIAL

## 2019-11-22 VITALS
TEMPERATURE: 99.2 F | BODY MASS INDEX: 34.84 KG/M2 | DIASTOLIC BLOOD PRESSURE: 80 MMHG | WEIGHT: 222 LBS | SYSTOLIC BLOOD PRESSURE: 136 MMHG | HEIGHT: 67 IN

## 2019-11-22 DIAGNOSIS — J01.00 ACUTE NON-RECURRENT MAXILLARY SINUSITIS: Primary | ICD-10-CM

## 2019-11-22 PROCEDURE — 1036F TOBACCO NON-USER: CPT | Performed by: FAMILY MEDICINE

## 2019-11-22 PROCEDURE — 99213 OFFICE O/P EST LOW 20 MIN: CPT | Performed by: FAMILY MEDICINE

## 2019-11-22 RX ORDER — AMOXICILLIN AND CLAVULANATE POTASSIUM 875; 125 MG/1; MG/1
1 TABLET, FILM COATED ORAL EVERY 12 HOURS SCHEDULED
Qty: 20 TABLET | Refills: 1 | Status: SHIPPED | OUTPATIENT
Start: 2019-11-22 | End: 2019-12-02

## 2019-11-22 NOTE — ASSESSMENT & PLAN NOTE
The patient appears to be suffering from an acute maxillary sinusitis  We are going to have him push fluids and rest   Will start Augmentin 875 b i d  For 10 days  He is asked to call if his symptoms have not essentially resolved 10 days though he did give him a refill and told him that he should continue treatment if he is not completely resolved at this 10 days  If he is not improved will re-evaluate  He agrees

## 2019-11-22 NOTE — PROGRESS NOTES
Tobacco Cessation Counseling: Tobacco cessation counseling was provided  The patient is sincerely urged to quit consumption of tobacco  He is not ready to quit tobacco  Medication options not discussed  Patient refused medication  Assessment/Plan:  Acute non-recurrent maxillary sinusitis  The patient appears to be suffering from an acute maxillary sinusitis  We are going to have him push fluids and rest   Will start Augmentin 875 b i d  For 10 days  He is asked to call if his symptoms have not essentially resolved 10 days though he did give him a refill and told him that he should continue treatment if he is not completely resolved at this 10 days  If he is not improved will re-evaluate  He agrees  Diagnoses and all orders for this visit:    Acute non-recurrent maxillary sinusitis  -     amoxicillin-clavulanate (AUGMENTIN) 875-125 mg per tablet; Take 1 tablet by mouth every 12 (twelve) hours for 10 days          Subjective:   Chief Complaint   Patient presents with    Nasal Congestion     Left side of face  3-4 days          Patient ID: Sebastian Nunez  is a 62 y o  male  HPI  Patient is a 26-year-old male being followed recently by Rheumatology for illegal arthritis, possibly rheumatoid in taking 50 mg of prednisone daily who states that approximately 4 days ago he noted the onset of a dull left-sided headache  It has progressed to localization to his left maxilla  He has had some nasal discharge in a weird smell  He has had no otalgia  He has had no fever and no cough  No myalgias  He has had increased arthralgias  His wife's been ill with respiratory condition though his grandchildren the live with him have been fine  He has had no nausea vomiting diarrhea  No rash        The following portions of the patient's history were reviewed and updated as appropriate: allergies, current medications, past medical history, past social history, past surgical history and problem list     Review of Systems   HENT: Positive for congestion  Negative for ear pain, nosebleeds and sore throat  Cardiovascular: Negative  Respiratory: Negative  Endocrine: Negative for polydipsia, polyphagia and polyuria  Hematologic/Lymphatic: Negative for adenopathy and bleeding problem  Does not bruise/bleed easily  Skin: Negative  Musculoskeletal: Positive for arthritis, joint pain, joint swelling and stiffness  Neurological: Positive for headaches  Negative for dizziness  Limited pertinent review of systems as per the HPI  Objective:    Physical Exam   Constitutional: He is oriented to person, place, and time  He appears well-developed and well-nourished  No distress  HENT:   Increased Waldeyer's ring especially on the left  He has left maxillary tenderness  He has nasal erythema and bogginess with purulent discharge on the left greater than right  Eyes: Right eye exhibits no discharge  Left eye exhibits no discharge  No scleral icterus  Neck: No thyromegaly present  Cardiovascular: Normal rate, regular rhythm and normal heart sounds  Pulmonary/Chest: Effort normal and breath sounds normal  No respiratory distress  He has no wheezes  He has no rales  Musculoskeletal: He exhibits no edema  Lymphadenopathy:     He has no cervical adenopathy  Neurological: He is alert and oriented to person, place, and time  Psychiatric: Thought content normal    Somewhat dysphoric appearance   Nursing note and vitals reviewed

## 2019-12-03 DIAGNOSIS — M25.50 POLYARTHRALGIA: ICD-10-CM

## 2019-12-03 RX ORDER — PREDNISONE 1 MG/1
TABLET ORAL
Qty: 90 TABLET | Refills: 1 | Status: SHIPPED | OUTPATIENT
Start: 2019-12-03 | End: 2020-02-11

## 2019-12-03 NOTE — TELEPHONE ENCOUNTER
Left detailed message on machine relaying information I advised pt to take 10mg of prednisone daily until he is seen in the office by dr Monica godoy;'d

## 2019-12-03 NOTE — TELEPHONE ENCOUNTER
Please call patient and let him know that all his blood work/imaging is back and it is all normal except for a low vitamin D level  I will send vitamin D 50,000 IUs weekly x 12 doses into the pharmacy  I refilled prednisone however I'd like him to start taking two 5 mg tablets(10 mg daily) x 2 weeks, then 5 mg daily x 2 weeks, then stop Prednisone so we can re evaluate him off this medication

## 2019-12-16 ENCOUNTER — OFFICE VISIT (OUTPATIENT)
Dept: RHEUMATOLOGY | Facility: CLINIC | Age: 64
End: 2019-12-16
Payer: COMMERCIAL

## 2019-12-16 VITALS
SYSTOLIC BLOOD PRESSURE: 152 MMHG | WEIGHT: 227.2 LBS | BODY MASS INDEX: 35.66 KG/M2 | HEART RATE: 84 BPM | DIASTOLIC BLOOD PRESSURE: 86 MMHG | HEIGHT: 67 IN

## 2019-12-16 DIAGNOSIS — Z79.52 CURRENT CHRONIC USE OF SYSTEMIC STEROIDS: ICD-10-CM

## 2019-12-16 DIAGNOSIS — M25.461 PAIN AND SWELLING OF RIGHT KNEE: ICD-10-CM

## 2019-12-16 DIAGNOSIS — M25.50 POLYARTHRALGIA: ICD-10-CM

## 2019-12-16 DIAGNOSIS — M25.561 PAIN AND SWELLING OF RIGHT KNEE: ICD-10-CM

## 2019-12-16 DIAGNOSIS — M13.162 MONOARTHRITIS OF LEFT KNEE: Primary | ICD-10-CM

## 2019-12-16 PROCEDURE — 20610 DRAIN/INJ JOINT/BURSA W/O US: CPT | Performed by: INTERNAL MEDICINE

## 2019-12-16 PROCEDURE — 99214 OFFICE O/P EST MOD 30 MIN: CPT | Performed by: INTERNAL MEDICINE

## 2019-12-16 RX ORDER — BUPIVACAINE HYDROCHLORIDE 2.5 MG/ML
3 INJECTION, SOLUTION INFILTRATION; PERINEURAL ONCE
Status: COMPLETED | OUTPATIENT
Start: 2019-12-16 | End: 2019-12-16

## 2019-12-16 RX ORDER — TRIAMCINOLONE ACETONIDE 40 MG/ML
40 INJECTION, SUSPENSION INTRA-ARTICULAR; INTRAMUSCULAR ONCE
Status: COMPLETED | OUTPATIENT
Start: 2019-12-16 | End: 2019-12-16

## 2019-12-16 RX ADMIN — TRIAMCINOLONE ACETONIDE 40 MG: 40 INJECTION, SUSPENSION INTRA-ARTICULAR; INTRAMUSCULAR at 15:34

## 2019-12-16 RX ADMIN — BUPIVACAINE HYDROCHLORIDE 3 ML: 2.5 INJECTION, SOLUTION INFILTRATION; PERINEURAL at 15:34

## 2019-12-16 NOTE — PROGRESS NOTES
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 who presents for rheumatology follow-up, currently on moderate dose of prednisone  We reviewed his history again and he developed joint pain about 5 years ago in his shoulders, hands, knees, and at that time saw Rheumatology who thought he possibly had some form of an inflammatory arthropathy  He was treated with NSAIDs with diclofenac which helped him and also Plaquenil which was apparently not affective  He then presented back to Rheumatology recently after being lost to follow-up over the past few years with recurrent knee pain and swelling  His main issue recently has been recurrent left knee pain and swelling which apparently does improve with prednisone but at moderate doses  When he tries to taper prednisone beyond the 15 mg he gets recurrent of joint pain in his knees and increase in his hands and shoulders  On his exam today he has a small suprapatellar left knee effusion, but the joint is minimally warm and has mostly full range of motion  No other joint swelling appreciated on exam today  We discussed that his clinical picture is not consistent with a systemic inflammatory arthritis given lack of findings on exam aside from his left knee swelling  However we discussed it is challenging to completely rule out a systemic inflammatory arthritis since he has been on prednisone over the last few months  It would be best to re-evaluate him off prednisone therapy or at least at a minimal dose  He was understanding and will try to wean down the prednisone before next visit  I did attempt a left knee aspiration today but only had a minimal bloody aspirate and the effusion seems mostly suprapatellar  I did inject Kenalog and I am hopeful this will help him wean down the prednisone  He will try to get down to 5 mg before his next visit    It is challenging to determine if the left knee swelling could simply be from osteoarthritis but there were not a lot of significant findings on his knee MRI and the joint fluid he had from September was not overly inflammatory  We discussed if we are unable to wean prednisone consistently in future we may have to discuss steroid sparing therapy with DMARDs, but for now given just 1 joint involvement I would prefer to see if he responds to the repeat steroid injection  Clinical picture is not consistent with something like rheumatoid arthritis given the isolated left knee involvement, but again he remains on chronic prednisone and so exam is limited  He will follow up with me in about 4 weeks to reassess  Plan:  Diagnoses and all orders for this visit:    Monoarthritis of left knee  -     Cancel: Synovial fluid, crystal  -     Cancel: Synovial fluid white cell count w/ diff  -     Cancel: Body fluid culture and Gram stain  -     Cancel: Lyme disease, PCR  -     triamcinolone acetonide (KENALOG-40) 40 mg/mL injection 40 mg  -     bupivacaine (MARCAINE) 0 25 % injection 3 mL    Pain and swelling of right knee    Polyarthralgia    Current chronic use of systemic steroids        Follow-up plan: 4 weeks       Rheumatic Disease Summary:  1   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   -Rheum consult 10/3/19 for left knee pain/swelling; previously saw Dr Akua Zelaya around 2014 for 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 and tried plaquenil but stopped due to diarrhea; improvement with diclofenac and prednisone courses   -Labs 2015: ESTEFANÍA 40, negative dsDNA, prabhakar, RNP, SSA, SSB,   -Presented on pred 15mg and meloxicam   -Synovial fluid left knee 9/10/19: WBC 5,305, 26% neutrophils, negative crystals and Lyme PCR   -Labs 8/2019: negative RF, lyme, hep c   -MRI left knee 9/25/19: small joint effusion, no mention of synovitis, MCL sprain, no internal derangement noted   -XR hands/feet/SI joints 10/3/19: OA, no erosive inflammatory changes   -Labs 10/4/19: negative ESTEFANÍA, CCP, SSA, SSB, HLA-B27, RPR, GC/chlamydia, TB, HIV, hep panel, uric acid 6 7, CRP 5 7, ESR 31, vit D 18  -Initial visit: no IA on exam on pred 15mg, advised to wean to 10mg  -Visit 12/16/19: attempted left knee aspiration with minimal bloody fluid, injected kenalog, wean pred to 7 5mg then 5mg if able; no signs of a systemic IA   2  Comorbidities: GERD      QIANA  Romain Russ  is a 62 y o   male who presents for rheumatology follow-up for a possible inflammatory arthropathy, currently on chronic systemic steroids  After last visit, we advised him to wean his prednisone from 15 mg to 10 mg  Left knee very swollen over the summer with "excruciating pain"  Went to PCP who put him on prednisone taper  Recalls as he got down to a lower dose (unknown dose) that his joint pain and swelling recurred  He then went back up on the prednisone dose and has been on prednisone over the past few months  He feels best at doses of 15 mg or higher and when he tries to wean he feels worse again  He dropped prednisone dose to 10mg just a few days ago  and reports he does feel worse already in his knees for the most part  Main issues are his knees which are very painful and swell  His left knee is swollen again at this time and causing him the most issue  He had this drained in September and injected with a steroid but he states it did not help him  It has not recurred to the same degree but he has developed swelling again  He had an MRI of the left knee which showed a small joint effusion, no mention of synovitis, and no internal derangement  Mild osteoarthritis was noted  He has no history of psoriasis, inflammatory bowel or eye disease  Other joint pain is in his fingers and shoulders  Morning stiffness in his joints is variable and not the same day to day  Some days he does not have any major issues and other days it can take him up to an hour or 2 to loosen up    He recalls in the past with the previous rheumatologist that diclofenac helped him a lot but was taken off of this at some point since he was using it chronically  He is not currently using any NSAIDs, just taking prednisone  He apparently has not been working consistently due to his ongoing issue with his knee and works as a   His wife brought in paperwork for an extensive physical capacity workup evaluation but we discussed that I do not perform these in Rheumatology  The following portions of the patient's history were reviewed and updated as appropriate: allergies, current medications, past family history, past medical history, past social history, past surgical history and problem list     Review of Systems:   Review of Systems   Constitutional: Negative for chills, fatigue, fever and unexpected weight change  HENT: Negative for mouth sores and trouble swallowing  Eyes: Negative for pain and visual disturbance  Respiratory: Negative for cough and shortness of breath  Cardiovascular: Negative for chest pain and leg swelling  Gastrointestinal: Negative for abdominal pain, blood in stool, constipation, diarrhea and nausea  Genitourinary: Negative for hematuria  Musculoskeletal: Positive for arthralgias and joint swelling  Negative for back pain and myalgias  Skin: Negative for rash  Neurological: Negative for weakness and numbness  Hematological: Negative for adenopathy  Psychiatric/Behavioral: Negative for sleep disturbance         Home Medications:     Current Outpatient Medications:     ergocalciferol (VITAMIN D2) 50,000 units, Take 1 capsule (50,000 Units total) by mouth once a week, Disp: 12 capsule, Rfl: 0    HYDROcodone-acetaminophen (NORCO) 5-325 mg per tablet, Take 1 tablet by mouth every 6 (six) hours as needed for painMax Daily Amount: 4 tablets, Disp: 30 tablet, Rfl: 0    predniSONE 5 mg tablet, TAKE 3 TABLETS BY MOUTH DAILY, Disp: 90 tablet, Rfl: 1  No current facility-administered medications for this visit  Objective:    Vitals:    12/16/19 1456   BP: 152/86   BP Location: Left arm   Patient Position: Sitting   Cuff Size: Standard   Pulse: 84   Weight: 103 kg (227 lb 3 2 oz)   Height: 5' 7" (1 702 m)       Physical Exam   Constitutional: He appears well-developed and well-nourished  He is cooperative  HENT:   Head: Normocephalic and atraumatic  Eyes: Conjunctivae and EOM are normal  No scleral icterus  Neck: No spinous process tenderness and no muscular tenderness present  No thyromegaly present  Musculoskeletal:   Small suprapatellar knee effusion with minimal warmth, full ROM Of the knee, tenderness medially  No other joint swelling on exam or significant warmth at the joints  No obvious synovitis anywhere  Tenderness in the glenohumeral joints, mild throughout the PIP joints and the medial left knee  Lymphadenopathy:     He has no cervical adenopathy  Neurological: He is alert  No sensory deficit  Skin: Skin is warm and dry  No rash noted  Nails show no clubbing  Psychiatric: He has a normal mood and affect  Musculoskeletal--Peripheral Joint Exam:  Physical Exam     Tenderness:   LLE: tibiofemoral    Swelling:   LLE: tibiofemoral    VALDOVINOS-28 tender joint count: 1  VALDOVINOS-28 swollen joint count: 1  ESR (mm/hr): 31  Patient global assessment: 45  VALDOVINOS-28 score: 3 87 (Moderate Disease Activity)      Imaging:   XR bilateral hands 10/3/19:  No significant degenerative changes  No periarticular erosions are seen  No lytic or blastic lesions are seen  Soft tissues are unremarkable  IMPRESSION:  No acute osseous abnormality  XR right foot:  IMPRESSION:  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  No inflammatory arthritic changes are seen  XR left foot:  IMPRESSION:  No acute osseous abnormality  XR SI joints:  IMPRESSION:  Unremarkable SI joints      Labs:   Component      Latest Ref Rng & Units 10/4/2019   HEPATITIS A IGM ANTIBODY NON-REACTIVE NON-REACTIVE   HBsAg Screen      NON-REACTIVE NON-REACTIVE   HEPATITIS B CORE IGM ANTIBODY      NON-REACTIVE NON-REACTIVE   HEPATITIS C ANTIBODY      NON-REACTIVE NON-REACTIVE   SIGNAL TO CUT-OFF      <1 00 0 01   Quest Quantiferon(R)-TB Gold Plus, 1 Tube      NEGATIVE NEGATIVE   NIL      IU/mL 0 02   Mitogen-NIL      IU/mL >10 00   TB1-NIL      IU/mL 0 00   TB2-NIL      IU/mL 0 00   Sjogren's Antibody (SS-A)      <1 0 NEG AI <1 0 NEG   Sjogren's Antibody (SS-B)      <1 0 NEG AI <1 0 NEG   HIV AG/AB, 4th Gen      NON-REACTIVE NON-REACTIVE   URIC ACID      4 0 - 8 0 mg/dL 6 7   HLA-B27      NEGATIVE NEGATIVE   Sed Rate      < OR = 20 mm/h 31 (H)   ESTEFANÍA Screen, IFA      NEGATIVE NEGATIVE   Cyclic Citrullinated Peptide (CCP) Ab (IgG)      UNITS <16   C-Reactive Protein, Quant      <8 0 mg/L 5 7   EXTERNAL VITAMIN D,25      30 - 100 ng/mL 18 (L)   TSH W/RFX TO FREE T4      0 40 - 4 50 mIU/L 2 56   RPR TITER      NON-REACTIVE NON-REACTIVE

## 2019-12-17 ENCOUNTER — TELEPHONE (OUTPATIENT)
Dept: OBGYN CLINIC | Facility: CLINIC | Age: 64
End: 2019-12-17

## 2019-12-17 NOTE — TELEPHONE ENCOUNTER
I just discussed this yesterday with the patient and his wife in the office, that I do not even have a diagnosis or specific reason to keep him out of work  As I advised them, he needs to speak with whoever took him out of work (ortho or PCP)  Please call patient and his wife and advise the same again, that I have no diagnosis, as we discussed, and so no reason I can keep him out of work  Also I just gave him a steroid shot in the office yesterday, which will not effect for a few days

## 2019-12-17 NOTE — TELEPHONE ENCOUNTER
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

## 2019-12-17 NOTE — TELEPHONE ENCOUNTER
Spoke with patient and reiterated the message below and the conversation yesterday in office   He stated he hasnt seen his ortho doc in months so I advised him to talk to his PCP

## 2019-12-23 NOTE — TELEPHONE ENCOUNTER
Aleisha called to f/u on her request for work status documentation  I conveyed the original response in task and she will contact Mr Mary Singh to see what he has done to obtain status    Thank you

## 2019-12-31 ENCOUNTER — OFFICE VISIT (OUTPATIENT)
Dept: FAMILY MEDICINE CLINIC | Facility: CLINIC | Age: 64
End: 2019-12-31
Payer: COMMERCIAL

## 2019-12-31 VITALS
WEIGHT: 225 LBS | HEART RATE: 114 BPM | TEMPERATURE: 98.6 F | OXYGEN SATURATION: 95 % | DIASTOLIC BLOOD PRESSURE: 90 MMHG | HEIGHT: 67 IN | BODY MASS INDEX: 35.31 KG/M2 | SYSTOLIC BLOOD PRESSURE: 130 MMHG

## 2019-12-31 DIAGNOSIS — K21.9 GASTROESOPHAGEAL REFLUX DISEASE, ESOPHAGITIS PRESENCE NOT SPECIFIED: Primary | ICD-10-CM

## 2019-12-31 DIAGNOSIS — M19.90 INFLAMMATORY ARTHRITIS: ICD-10-CM

## 2019-12-31 PROBLEM — J01.00 ACUTE NON-RECURRENT MAXILLARY SINUSITIS: Status: RESOLVED | Noted: 2019-11-22 | Resolved: 2019-12-31

## 2019-12-31 PROBLEM — R11.2 NON-INTRACTABLE VOMITING WITH NAUSEA: Status: RESOLVED | Noted: 2019-02-11 | Resolved: 2019-12-31

## 2019-12-31 PROBLEM — M79.89 CALF SWELLING: Status: RESOLVED | Noted: 2019-09-26 | Resolved: 2019-12-31

## 2019-12-31 PROCEDURE — 99213 OFFICE O/P EST LOW 20 MIN: CPT | Performed by: FAMILY MEDICINE

## 2019-12-31 RX ORDER — OMEPRAZOLE 40 MG/1
40 CAPSULE, DELAYED RELEASE ORAL DAILY
COMMUNITY
End: 2019-12-31 | Stop reason: SDUPTHER

## 2019-12-31 RX ORDER — OMEPRAZOLE 40 MG/1
40 CAPSULE, DELAYED RELEASE ORAL DAILY
Qty: 30 CAPSULE | Refills: 5 | Status: SHIPPED | OUTPATIENT
Start: 2019-12-31 | End: 2020-09-08

## 2019-12-31 NOTE — LETTER
December 31, 2019     Patient: Tim Anthony  YOB: 1955   Date of Visit: 12/31/2019       To Whom it May Concern:    Radhika Marcum is under my professional care  He was seen in my office on 12/31/2019  He may return to work on 1/6/2020 without restriction  If you have any questions or concerns, please don't hesitate to call           Sincerely,          Kasie Badillo MD        CC: Kasie Badillo MD

## 2019-12-31 NOTE — PROGRESS NOTES
Tobacco Cessation Counseling: Tobacco cessation counseling was provided  The patient is sincerely urged to quit consumption of tobacco  He is not ready to quit tobacco    Assessment/Plan:  Inflammatory arthritis  The patient has had a acute arthritis of his left knee which has essentially subsided presently  He has minimal stiffness  He did receive an intra-articular injection from Rheumatology approximately 2 weeks ago and has weaned down on his prednisone dose to 5 mg daily  At this point I feel he can return to work without restriction  He is going to follow up with Rheumatology in the near future  Should he develop any exacerbation of this condition he is asked to call for follow-up  He is in agreement with this plan  Diagnoses and all orders for this visit:    Gastroesophageal reflux disease, esophagitis presence not specified  -     omeprazole (PriLOSEC) 40 MG capsule; Take 1 capsule (40 mg total) by mouth daily    Inflammatory arthritis    Other orders  -     Discontinue: omeprazole (PriLOSEC) 40 MG capsule; Take 40 mg by mouth daily          Subjective:   Chief Complaint   Patient presents with    Follow-up     here to get a return to work note  Patient ID: Dusty Wolf  is a 62 y o  male  HPI  Patient is a 78-year-old male with a history of inflammatory arthritis of the left knee  He has had a protracted history with this recently  He did originally see Orthopedic surgery who gave him an intra-articular injection  He saw no improvement  He was referred to Rheumatology  He was on oral prednisone at that time  Rheumatology stated that workup would not likely be fruitful based on his current regimen of oral steroids  He was given another intra-articular injection which he stated was highly effective at alleviating his knee pain and swelling  He has been able to wean down on his prednisone dose and is currently taking only 1 5 mg tablet daily    He has been out of work for many weeks at this point  He needs a return to work slip  He does feel that he can perform his job adequately  He feels that his strength and range of motion in the knee is adequate and his pain level has diminished nearly to 0  He has some minimal stiffness  He has no fevers chills or other joint complaints  No rash X cetera  The following portions of the patient's history were reviewed and updated as appropriate: allergies, current medications, past family history, past medical history, past social history, past surgical history and problem list     ROS    Limited pertinent review of systems as per the HPI  Objective:    Physical Exam   Constitutional: He is oriented to person, place, and time  He appears well-developed and well-nourished  Overweight and in no distress   Musculoskeletal: He exhibits no edema, tenderness or deformity  Range of motion in the left knee is normal   He can fully extend and flex  He does note that he feels some tightness with full flexion  He does continue to have some synovial thickening but he has no active effusion  He does have some popliteal fullness  He has no erythema or warmth presently  Patellar grind is negative  He has no calf tenderness or edema  Neurological: He is alert and oriented to person, place, and time  Psychiatric: He has a normal mood and affect  Nursing note and vitals reviewed

## 2019-12-31 NOTE — ASSESSMENT & PLAN NOTE
The patient has had a acute arthritis of his left knee which has essentially subsided presently  He has minimal stiffness  He did receive an intra-articular injection from Rheumatology approximately 2 weeks ago and has weaned down on his prednisone dose to 5 mg daily  At this point I feel he can return to work without restriction  He is going to follow up with Rheumatology in the near future  Should he develop any exacerbation of this condition he is asked to call for follow-up  He is in agreement with this plan

## 2020-01-15 ENCOUNTER — OFFICE VISIT (OUTPATIENT)
Dept: RHEUMATOLOGY | Facility: CLINIC | Age: 65
End: 2020-01-15
Payer: COMMERCIAL

## 2020-01-15 VITALS
HEIGHT: 67 IN | HEART RATE: 100 BPM | WEIGHT: 225 LBS | BODY MASS INDEX: 35.31 KG/M2 | SYSTOLIC BLOOD PRESSURE: 144 MMHG | DIASTOLIC BLOOD PRESSURE: 82 MMHG

## 2020-01-15 DIAGNOSIS — M13.162 MONOARTHRITIS OF LEFT KNEE: ICD-10-CM

## 2020-01-15 DIAGNOSIS — Z79.52 CURRENT CHRONIC USE OF SYSTEMIC STEROIDS: ICD-10-CM

## 2020-01-15 DIAGNOSIS — M19.90 INFLAMMATORY ARTHRITIS: Primary | ICD-10-CM

## 2020-01-15 DIAGNOSIS — Z79.899 LONG TERM USE OF DRUG: ICD-10-CM

## 2020-01-15 PROCEDURE — 99214 OFFICE O/P EST MOD 30 MIN: CPT | Performed by: INTERNAL MEDICINE

## 2020-01-15 RX ORDER — SULFASALAZINE 500 MG/1
TABLET ORAL
Qty: 120 TABLET | Refills: 2 | Status: SHIPPED | OUTPATIENT
Start: 2020-01-15 | End: 2020-06-30 | Stop reason: SDUPTHER

## 2020-01-15 NOTE — PATIENT INSTRUCTIONS
Continue prednisone 5mg for another 4-6 weeks, then try to reduce dose to 2 5mg daily   Start Sulfasalazine: take 1 tablet daily for 7 days, then increase to 1 tablet twice per day for 7 days, then increase to 2 tablets twice daily thereafter and stay on that dose  Get labs done in 2 weeks, and then every 4 weeks for now; standing lab orders were given to you and are in your chart

## 2020-02-04 LAB
ALBUMIN SERPL-MCNC: 4 G/DL (ref 3.6–5.1)
ALBUMIN/GLOB SERPL: 1.7 (CALC) (ref 1–2.5)
ALP SERPL-CCNC: 79 U/L (ref 35–144)
ALT SERPL-CCNC: 24 U/L (ref 9–46)
AST SERPL-CCNC: 20 U/L (ref 10–35)
BASOPHILS # BLD AUTO: 99 CELLS/UL (ref 0–200)
BASOPHILS NFR BLD AUTO: 1.3 %
BILIRUB SERPL-MCNC: 0.6 MG/DL (ref 0.2–1.2)
BUN SERPL-MCNC: 13 MG/DL (ref 7–25)
BUN/CREAT SERPL: NORMAL (CALC) (ref 6–22)
CALCIUM SERPL-MCNC: 8.9 MG/DL (ref 8.6–10.3)
CHLORIDE SERPL-SCNC: 103 MMOL/L (ref 98–110)
CO2 SERPL-SCNC: 23 MMOL/L (ref 20–32)
CREAT SERPL-MCNC: 1.04 MG/DL (ref 0.7–1.25)
CRP SERPL-MCNC: 27.6 MG/L
EOSINOPHIL # BLD AUTO: 228 CELLS/UL (ref 15–500)
EOSINOPHIL NFR BLD AUTO: 3 %
ERYTHROCYTE [DISTWIDTH] IN BLOOD BY AUTOMATED COUNT: 13.4 % (ref 11–15)
ERYTHROCYTE [SEDIMENTATION RATE] IN BLOOD BY WESTERGREN METHOD: 6 MM/H
GLOBULIN SER CALC-MCNC: 2.3 G/DL (CALC) (ref 1.9–3.7)
GLUCOSE SERPL-MCNC: 95 MG/DL (ref 65–139)
HCT VFR BLD AUTO: 43.4 % (ref 38.5–50)
HGB BLD-MCNC: 14.3 G/DL (ref 13.2–17.1)
LYMPHOCYTES # BLD AUTO: 1444 CELLS/UL (ref 850–3900)
LYMPHOCYTES NFR BLD AUTO: 19 %
MCH RBC QN AUTO: 29.2 PG (ref 27–33)
MCHC RBC AUTO-ENTMCNC: 32.9 G/DL (ref 32–36)
MCV RBC AUTO: 88.6 FL (ref 80–100)
MONOCYTES # BLD AUTO: 874 CELLS/UL (ref 200–950)
MONOCYTES NFR BLD AUTO: 11.5 %
NEUTROPHILS # BLD AUTO: 4955 CELLS/UL (ref 1500–7800)
NEUTROPHILS NFR BLD AUTO: 65.2 %
PLATELET # BLD AUTO: 230 THOUSAND/UL (ref 140–400)
PMV BLD REES-ECKER: 10.9 FL (ref 7.5–12.5)
POTASSIUM SERPL-SCNC: 3.8 MMOL/L (ref 3.5–5.3)
PROT SERPL-MCNC: 6.3 G/DL (ref 6.1–8.1)
RBC # BLD AUTO: 4.9 MILLION/UL (ref 4.2–5.8)
SL AMB EGFR AFRICAN AMERICAN: 88 ML/MIN/1.73M2
SL AMB EGFR NON AFRICAN AMERICAN: 76 ML/MIN/1.73M2
SODIUM SERPL-SCNC: 137 MMOL/L (ref 135–146)
WBC # BLD AUTO: 7.6 THOUSAND/UL (ref 3.8–10.8)

## 2020-02-07 ENCOUNTER — OFFICE VISIT (OUTPATIENT)
Dept: FAMILY MEDICINE CLINIC | Facility: CLINIC | Age: 65
End: 2020-02-07
Payer: COMMERCIAL

## 2020-02-07 VITALS
SYSTOLIC BLOOD PRESSURE: 122 MMHG | DIASTOLIC BLOOD PRESSURE: 80 MMHG | WEIGHT: 225 LBS | BODY MASS INDEX: 35.31 KG/M2 | HEIGHT: 67 IN | TEMPERATURE: 99.9 F | OXYGEN SATURATION: 97 % | HEART RATE: 90 BPM

## 2020-02-07 DIAGNOSIS — B34.9 ACUTE VIRAL SYNDROME: Primary | ICD-10-CM

## 2020-02-07 PROCEDURE — 4004F PT TOBACCO SCREEN RCVD TLK: CPT | Performed by: FAMILY MEDICINE

## 2020-02-07 PROCEDURE — 3008F BODY MASS INDEX DOCD: CPT | Performed by: FAMILY MEDICINE

## 2020-02-07 PROCEDURE — 99214 OFFICE O/P EST MOD 30 MIN: CPT | Performed by: FAMILY MEDICINE

## 2020-02-07 RX ORDER — OSELTAMIVIR PHOSPHATE 75 MG/1
75 CAPSULE ORAL EVERY 12 HOURS SCHEDULED
Qty: 10 CAPSULE | Refills: 0 | Status: SHIPPED | OUTPATIENT
Start: 2020-02-07 | End: 2020-02-12

## 2020-02-07 RX ORDER — BENZONATATE 200 MG/1
200 CAPSULE ORAL 3 TIMES DAILY PRN
Qty: 20 CAPSULE | Refills: 0 | Status: SHIPPED | OUTPATIENT
Start: 2020-02-07 | End: 2020-10-13 | Stop reason: ALTCHOICE

## 2020-02-07 NOTE — LETTER
February 7, 2020     Patient: Tish Mendez  YOB: 1955   Date of Visit: 2/7/2020       To Whom it May Concern:    Laurel Almonte is under my professional care  He was seen in my office on 2/7/2020  He may return to work on 2/12/2020  If you have any questions or concerns, please don't hesitate to call           Sincerely,          Desire Colon MD        CC: Desire Colon MD

## 2020-02-07 NOTE — PROGRESS NOTES
Assessment/Plan:  Acute viral syndrome  Patient has acute febrile respiratory illness  This may represent influenza, he was not vaccinated  Could also represent RSV or other  He has no physical findings to suggest pneumonitis or sinusitis  We are going to have him push fluids and rest   Will use Tylenol or Advil  We are going to give him Tamiflu 75 b i d  For 5 days  Will remain out of work for the next 5 days or so  He is asked to call tomorrow with report of his condition  He is asked to call sooner seek more urgent medical attention should his condition worsen  He agrees  Diagnoses and all orders for this visit:    Acute viral syndrome  -     oseltamivir (TAMIFLU) 75 mg capsule; Take 1 capsule (75 mg total) by mouth every 12 (twelve) hours for 5 days  -     benzonatate (TESSALON) 200 MG capsule; Take 1 capsule (200 mg total) by mouth 3 (three) times a day as needed for cough          Subjective:   Chief Complaint   Patient presents with    Nasal Congestion     sinus pressure        Patient ID: Jeana Corrales  is a 62 y o  male  I felt really flushed last night and again this morning  Head pressure  Myalgias  No ST  A little cough  No N/V or increased diarrhea  Knee improved  No  sx  No rash  No co workers ill  [address] daughter ill with flu like illness though PCR was negative  HPI  The patient is a 73-year-old male who presents today for evaluation of acute respiratory illness  He states he has had some congestion and cold-like symptoms for the past 2 days  Last night he developed a flushed feeling  That seemed to improve overnight but again reoccurred while he was getting ready for work  He has had some head pressure as well as diffuse muscle aches  He has had no sore throat  He is developing a cough which is nonproductive  He has no chest pain or shortness of breath  He has no nausea or vomiting but he has had some loose stools which occurred once he began sulfasalazine    He states that his knee has improved since beginning same  He has had no rash  No coworkers are ill that he is aware of  His 49-year-old granddaughter who lives with him has had similar respiratory illness  She did have influenza testing which was negative  The following portions of the patient's history were reviewed and updated as appropriate: allergies, current medications, past medical history, past social history, past surgical history and problem list     Review of Systems   HENT: Positive for congestion  Negative for ear pain and sore throat  Cardiovascular: Negative for chest pain and leg swelling  No calf tenderness   Respiratory: Positive for cough  Negative for hemoptysis, shortness of breath, snoring and wheezing  Endocrine: Negative for polydipsia, polyphagia and polyuria  Hematologic/Lymphatic: Negative for adenopathy and bleeding problem  Does not bruise/bleed easily  Skin: Negative for rash  Musculoskeletal: Positive for stiffness  Gastrointestinal: Positive for diarrhea  Negative for bloating, abdominal pain and constipation  Genitourinary: Negative for bladder incontinence, dysuria and flank pain  Objective:    Physical Exam   Constitutional: He is oriented to person, place, and time  He appears well-developed  Overweight and appearing mildly acutely ill but in no distress   HENT:   Mouth/Throat: No oropharyngeal exudate  Mucosa moist and without ulcer exudate or lesion  Eyes: Conjunctivae are normal  Right eye exhibits no discharge  Left eye exhibits no discharge  Neck: Neck supple  No JVD present  No thyromegaly present  Cardiovascular: Normal rate and regular rhythm  Pulmonary/Chest: Effort normal and breath sounds normal  No respiratory distress  He has no wheezes  He has no rales  Musculoskeletal: He exhibits no edema  Lymphadenopathy:     He has no cervical adenopathy  Neurological: He is alert and oriented to person, place, and time  Skin: No rash noted  No erythema  Psychiatric: He has a normal mood and affect  Thought content normal    Nursing note and vitals reviewed

## 2020-02-07 NOTE — ASSESSMENT & PLAN NOTE
Patient has acute febrile respiratory illness  This may represent influenza, he was not vaccinated  Could also represent RSV or other  He has no physical findings to suggest pneumonitis or sinusitis  We are going to have him push fluids and rest   Will use Tylenol or Advil  We are going to give him Tamiflu 75 b i d  For 5 days  Will remain out of work for the next 5 days or so  He is asked to call tomorrow with report of his condition  He is asked to call sooner seek more urgent medical attention should his condition worsen  He agrees

## 2020-02-11 DIAGNOSIS — M25.50 POLYARTHRALGIA: ICD-10-CM

## 2020-02-11 RX ORDER — PREDNISONE 1 MG/1
5 TABLET ORAL DAILY
Qty: 60 TABLET | Refills: 0 | Status: SHIPPED | OUTPATIENT
Start: 2020-02-11 | End: 2020-06-30

## 2020-02-29 DIAGNOSIS — E55.9 VITAMIN D DEFICIENCY: ICD-10-CM

## 2020-03-02 RX ORDER — ERGOCALCIFEROL 1.25 MG/1
CAPSULE ORAL
Qty: 12 CAPSULE | Refills: 0 | OUTPATIENT
Start: 2020-03-02

## 2020-03-09 LAB
ALBUMIN SERPL-MCNC: 4.1 G/DL (ref 3.6–5.1)
ALBUMIN/GLOB SERPL: 1.9 (CALC) (ref 1–2.5)
ALP SERPL-CCNC: 72 U/L (ref 35–144)
ALT SERPL-CCNC: 24 U/L (ref 9–46)
AST SERPL-CCNC: 17 U/L (ref 10–35)
BASOPHILS # BLD AUTO: 86 CELLS/UL (ref 0–200)
BASOPHILS NFR BLD AUTO: 0.8 %
BILIRUB SERPL-MCNC: 0.5 MG/DL (ref 0.2–1.2)
BUN SERPL-MCNC: 14 MG/DL (ref 7–25)
BUN/CREAT SERPL: NORMAL (CALC) (ref 6–22)
CALCIUM SERPL-MCNC: 8.9 MG/DL (ref 8.6–10.3)
CHLORIDE SERPL-SCNC: 104 MMOL/L (ref 98–110)
CO2 SERPL-SCNC: 25 MMOL/L (ref 20–32)
CREAT SERPL-MCNC: 1.14 MG/DL (ref 0.7–1.25)
CRP SERPL-MCNC: 5.9 MG/L
EOSINOPHIL # BLD AUTO: 235 CELLS/UL (ref 15–500)
EOSINOPHIL NFR BLD AUTO: 2.2 %
ERYTHROCYTE [DISTWIDTH] IN BLOOD BY AUTOMATED COUNT: 13.3 % (ref 11–15)
ERYTHROCYTE [SEDIMENTATION RATE] IN BLOOD BY WESTERGREN METHOD: 2 MM/H
GLOBULIN SER CALC-MCNC: 2.2 G/DL (CALC) (ref 1.9–3.7)
GLUCOSE SERPL-MCNC: 97 MG/DL (ref 65–139)
HCT VFR BLD AUTO: 45.6 % (ref 38.5–50)
HGB BLD-MCNC: 15.1 G/DL (ref 13.2–17.1)
LYMPHOCYTES # BLD AUTO: 3082 CELLS/UL (ref 850–3900)
LYMPHOCYTES NFR BLD AUTO: 28.8 %
MCH RBC QN AUTO: 29.4 PG (ref 27–33)
MCHC RBC AUTO-ENTMCNC: 33.1 G/DL (ref 32–36)
MCV RBC AUTO: 88.9 FL (ref 80–100)
MONOCYTES # BLD AUTO: 1027 CELLS/UL (ref 200–950)
MONOCYTES NFR BLD AUTO: 9.6 %
NEUTROPHILS # BLD AUTO: 6270 CELLS/UL (ref 1500–7800)
NEUTROPHILS NFR BLD AUTO: 58.6 %
PLATELET # BLD AUTO: 246 THOUSAND/UL (ref 140–400)
PMV BLD REES-ECKER: 11.1 FL (ref 7.5–12.5)
POTASSIUM SERPL-SCNC: 4.1 MMOL/L (ref 3.5–5.3)
PROT SERPL-MCNC: 6.3 G/DL (ref 6.1–8.1)
RBC # BLD AUTO: 5.13 MILLION/UL (ref 4.2–5.8)
SL AMB EGFR AFRICAN AMERICAN: 79 ML/MIN/1.73M2
SL AMB EGFR NON AFRICAN AMERICAN: 68 ML/MIN/1.73M2
SODIUM SERPL-SCNC: 138 MMOL/L (ref 135–146)
WBC # BLD AUTO: 10.7 THOUSAND/UL (ref 3.8–10.8)

## 2020-03-13 ENCOUNTER — TELEPHONE (OUTPATIENT)
Dept: OBGYN CLINIC | Facility: HOSPITAL | Age: 65
End: 2020-03-13

## 2020-03-13 NOTE — TELEPHONE ENCOUNTER
Monie STEPHENSON is calling  Insurance only covers a 90 day supply for Sulfasalazine    Please call pharmacy back at [phone]

## 2020-03-25 ENCOUNTER — TELEMEDICINE (OUTPATIENT)
Dept: RHEUMATOLOGY | Facility: CLINIC | Age: 65
End: 2020-03-25
Payer: COMMERCIAL

## 2020-03-25 DIAGNOSIS — M47.819 PERIPHERAL SPONDYLOARTHRITIS: Primary | ICD-10-CM

## 2020-03-25 DIAGNOSIS — Z79.899 LONG TERM USE OF DRUG: ICD-10-CM

## 2020-03-25 DIAGNOSIS — M13.162 MONOARTHRITIS OF LEFT KNEE: ICD-10-CM

## 2020-03-25 DIAGNOSIS — Z79.52 CURRENT CHRONIC USE OF SYSTEMIC STEROIDS: ICD-10-CM

## 2020-03-25 PROCEDURE — 99214 OFFICE O/P EST MOD 30 MIN: CPT | Performed by: INTERNAL MEDICINE

## 2020-03-25 NOTE — PROGRESS NOTES
Virtual Regular Visit    Assessment and Plan:     Patient is a 61-year-old male who presents for rheumatology follow-up via telemedicine for recurrent left knee inflammatory monoarthritis with suspicion for peripheral spondyloarthropathy  So far he is only had obvious involvement of his left knee which is not a typical presentation but can be seen in patients with peripheral spondyloarthropathy  He has required chronic prednisone for his knee and so at last visit we start sulfasalazine for steroid sparing  Fortunately it sounds like he has responded well to this so far as he has been able to taper down his prednisone to 2 5 mg daily without issues and he was previously unable to do this  He reports initially with taking 500 mg daily he had diarrhea for about 1 week and then this resolved and has not had recurrent diarrhea on the once daily dosing  When he tried to go up to 500 mg b i d , he had recurrent diarrhea again and so has only been on the 500 mg daily dose  I discussed with him that it would be good to try to go back up to the b i d  dosing so that we can ultimately discontinue the prednisone if we are able  Now that he has been on the drug for about 2 months we discussed he might temporarily again have diarrhea initially when increasing his dose but I would not expected to persist, similarly to when he 1st started the medication  He was agreeable with that plan and if he is able to tolerate the b i d  dosing then he will try stopping his prednisone at some point in April  I also asked him to get repeat labs again about 6 weeks from his last set of labs which would be in mid April since we are increasing his dose  He then can get labs again before his next follow-up visit if everything is stable  He was agreeable with the plan and will let us know of any major issues in the meantime        Problem List Items Addressed This Visit     None      Visit Diagnoses     Peripheral spondyloarthritis    - Primary    Monoarthritis of left knee        Current chronic use of systemic steroids        Long term use of drug            Follow-up plan: 2-3 months with Dr Mirna Welsh or [address]       Reason for visit is follow-up left knee inflammatory monoarthritis    Encounter provider Cristopher Ponce DO    Provider located at [address] [place] [id number]      Recent Visits  No visits were found meeting these conditions  Showing recent visits within past 7 days and meeting all other requirements     Future Appointments  No visits were found meeting these conditions  Showing future appointments within next 150 days and meeting all other requirements        After connecting through [employer], the patient was identified by name and date of birth  Maricarmenmonty Burrisricky Scott  was informed that this is a telemedicine visit and that the visit is being conducted through telephone which may not be secure and therefore, might not be HIPAA-compliant  My office door was closed  No one else was in the room  He acknowledged consent and understanding of privacy and security of the video platform  The patient has agreed to participate and understands they can discontinue the visit at any time  Rheumatic Disease Summary  1   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   -Rheum consult 10/3/19 for left knee pain/swelling; previously saw Dr Teodoro Jacques around 2014 for 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 and tried plaquenil but stopped due to diarrhea; improvement with diclofenac and prednisone courses   -Labs 2015: ESTEFANÍA 40, negative dsDNA, prabhakar, RNP, SSA, SSB,   -Presented on pred 15mg and meloxicam   -Synovial fluid left knee 9/10/19: WBC 5,305, 26% neutrophils, negative crystals and Lyme PCR   -Labs 8/2019: negative RF, lyme, hep c   -MRI left knee 9/25/19: small joint effusion, no mention of synovitis, MCL sprain, no internal derangement noted   -XR hands/feet/SI joints 10/3/19: OA, no erosive inflammatory changes   -Labs 10/4/19: negative ESTEFANÍA, CCP, SSA, SSB, HLA-B27, RPR, GC/chlamydia, TB, HIV, hep panel, uric acid 6 7, CRP 5 7, ESR 31, vit D 18  -Initial visit: no IA on exam on pred 15mg, advised to wean to 10mg  -Visit 12/16/19: attempted left knee aspiration with minimal bloody fluid, injected kenalog, wean pred to 7 5mg then 5mg if able; no signs of a systemic IA   -Visit 1/15/20: improved temporarily with injection, pred down to 5mg but recurrent swelling/warmth; started sulfasalazine for possible inflammatory monoarthritis with goal of weaning off prednisone  -Visit 3/25/20 (telemedicine): doing better on SSZ just 500mg daily, try going to BID again and then D/C pred 2 5mg if able  2  Comorbidities: GERD     Subjective  Oliva Golden is a 62 y o  male who presents for rheumatology follow-up via telemedicine for inflammatory monoarthritis of the left knee  He has had recurrent monoarthritis with inflammatory joint fluid on aspiration of his left knee which is very prednisone responsive  Suspicion has now been for a form of peripheral spondyloarthropathy with a monoarthritis manifestation  Last visit due to him using chronic prednisone for this we decided to start sulfasalazine for steroid sparing  Patient reports he is doing better on sulfasalazine and definitely feels it has been helping his knee  He has not had the recurrent swelling and the pain is sometimes 0 on certain days  He is on prednisone 2 5 mg now and he tapered from 5 mg to this dose about 4 weeks ago and reports no difficulty with doing this since starting the sulfasalazine  Previously he had significant difficulty getting the low prednisone 10 mg  The plan was to titrate up his dose of sulfasalazine to 1000 mg b i d , but he states he is currently actually only taking 500 mg once daily    He reports when he initially started the medication he had diarrhea for about 1 week on the once daily dosing and then this went away  He then increase to 1 tablet b i d  and had recurrent diarrhea again and so went back down to just 1 tablet daily  He is no longer having diarrhea since staying on the 1 tablet daily and does feel he can try increasing his dose again  We reviewed his recent blood work which is all stable with improved inflammatory markers on the sulfasalazine  He denies any new changes such as rashes or any other changes in his health  Past Medical History:   Diagnosis Date    Abnormal ECG     Last Assessed 2/29/2016    Acute non-recurrent maxillary sinusitis 11/22/2019    Calf swelling 9/26/2019    CAP (community acquired pneumonia) 10/12/2016       Past Surgical History:   Procedure Laterality Date    INGUINAL HERNIA REPAIR         Current Outpatient Medications   Medication Sig Dispense Refill    benzonatate (TESSALON) 200 MG capsule Take 1 capsule (200 mg total) by mouth 3 (three) times a day as needed for cough 20 capsule 0    ergocalciferol (VITAMIN D2) 50,000 units Take 1 capsule (50,000 Units total) by mouth once a week 12 capsule 0    omeprazole (PriLOSEC) 40 MG capsule Take 1 capsule (40 mg total) by mouth daily 30 capsule 5    predniSONE 5 mg tablet Take 1 tablet (5 mg total) by mouth daily 60 tablet 0    sulfaSALAzine (AZULFIDINE) 500 mg tablet 1 tab daily x7 days, then 1 tab BID x7 days, then 2 tabs BID thereafter 120 tablet 2     No current facility-administered medications for this visit  No Known Allergies    Review of Systems   Constitutional: Negative for fatigue and unexpected weight change  HENT: Negative for mouth sores  Respiratory: Negative for cough and shortness of breath  Gastrointestinal: Positive for diarrhea (now resolved)  Negative for constipation  Musculoskeletal: Positive for arthralgias  Negative for back pain, joint swelling and myalgias  Skin: Negative for color change and rash     Neurological: Negative for weakness  Psychiatric/Behavioral: Negative for sleep disturbance  Labs:   Component      Latest Ref Rng & Units 3/7/2020   White Blood Cell Count      3 8 - 10 8 Thousand/uL 10 7   Red Blood Cell Count      4 20 - 5 80 Million/uL 5 13   Hemoglobin      13 2 - 17 1 g/dL 15 1   HCT      38 5 - 50 0 % 45 6   MCV      80 0 - 100 0 fL 88 9   MCH      27 0 - 33 0 pg 29 4   MCHC        32 0 - 36 0 g/dL 33 1   RDW      11 0 - 15 0 % 13 3   Platelet Count      340 - 400 Thousand/uL 246   SL AMB MPV      7 5 - 12 5 fL 11 1   Neutrophils (Absolute)      1,500 - 7,800 cells/uL 6,270   Lymphocytes (Absolute)      850 - 3,900 cells/uL 3,082   Monocytes (Absolute)      200 - 950 cells/uL 1,027 (H)   Eosinophils (Absolute)      15 - 500 cells/uL 235   Basophils ABS      0 - 200 cells/uL 86   Neutrophils      % 58 6   Lymphocytes      % 28 8   Monocytes      % 9 6   Eosinophils      % 2 2   Basophils PCT      % 0 8   Glucose, Random      65 - 139 mg/dL 97   BUN      7 - 25 mg/dL 14   Creatinine      0 70 - 1 25 mg/dL 1 14   eGFR Non       > OR = 60 mL/min/1 73m2 68   eGFR       > OR = 60 mL/min/1 73m2 79   SL AMB BUN/CREATININE RATIO      6 - 22 (calc) NOT APPLICABLE   Sodium      135 - 146 mmol/L 138   Potassium      3 5 - 5 3 mmol/L 4 1   Chloride      98 - 110 mmol/L 104   CO2      20 - 32 mmol/L 25   Calcium      8 6 - 10 3 mg/dL 8 9   Total Protein      6 1 - 8 1 g/dL 6 3   Albumin      3 6 - 5 1 g/dL 4 1   Globulin      1 9 - 3 7 g/dL (calc) 2 2   Albumin/Globulin Ratio      1 0 - 2 5 (calc) 1 9   TOTAL BILIRUBIN      0 2 - 1 2 mg/dL 0 5   Alkaline Phosphatase      35 - 144 U/L 72   AST      10 - 35 U/L 17   ALT      9 - 46 U/L 24   Sed Rate      < OR = 20 mm/h 2   C-Reactive Protein, Quant      <8 0 mg/L 5 9     Component      Latest Ref Rng & Units 2/3/2020   Sed Rate      < OR = 20 mm/h 6   C-Reactive Protein, Quant      <8 0 mg/L 27 6 (H)       I spent 15 minutes with the patient during this visit

## 2020-03-25 NOTE — PATIENT INSTRUCTIONS
Try increasing Sulfasalazine to 1 tablet 2 times per day again; if you experience diarrhea, I presume this will resolve once your body adjusts to the increased dose  If you are able to stay on the 2 times per day dose, then try stopping your prednisone some time in April if you are able  Get labs done in mid-April, and then again 1 week before your scheduled follow-up visit  Your standing lab orders are still good in your chart

## 2020-03-25 NOTE — LETTER
March 25, 2020     Mortimer Boards, MD  [facility]    Patient: Tawanna Reeder  YOB: 1955   Date of Visit: 3/25/2020       Dear Dr Thurmond Aschoff:    Thank you for referring [place] Flowers to me for evaluation  Below are my notes for this consultation  If you have questions, please do not hesitate to call me  I look forward to following your patient along with you  Sincerely,        Lisa Saab DO        CC: No Recipients  Lisa Saab DO  3/25/2020 10:00 AM  Sign at close encounter    Virtual Regular Visit    Assessment and Plan:     Patient is a 36-year-old male who presents for rheumatology follow-up via telemedicine for recurrent left knee inflammatory monoarthritis with suspicion for peripheral spondyloarthropathy  So far he is only had obvious involvement of his left knee which is not a typical presentation but can be seen in patients with peripheral spondyloarthropathy  He has required chronic prednisone for his knee and so at last visit we start sulfasalazine for steroid sparing  Fortunately it sounds like he has responded well to this so far as he has been able to taper down his prednisone to 2 5 mg daily without issues and he was previously unable to do this  He reports initially with taking 500 mg daily he had diarrhea for about 1 week and then this resolved and has not had recurrent diarrhea on the once daily dosing  When he tried to go up to 500 mg b i d , he had recurrent diarrhea again and so has only been on the 500 mg daily dose  I discussed with him that it would be good to try to go back up to the b i d  dosing so that we can ultimately discontinue the prednisone if we are able  Now that he has been on the drug for about 2 months we discussed he might temporarily again have diarrhea initially when increasing his dose but I would not expected to persist, similarly to when he 1st started the medication    He was agreeable with that plan and if he is able to tolerate the b i d  dosing then he will try stopping his prednisone at some point in April  I also asked him to get repeat labs again about 6 weeks from his last set of labs which would be in mid April since we are increasing his dose  He then can get labs again before his next follow-up visit if everything is stable  He was agreeable with the plan and will let us know of any major issues in the meantime  Problem List Items Addressed This Visit     None      Visit Diagnoses     Monoarthritis of left knee    -  Primary    Current chronic use of systemic steroids        Long term use of drug            Follow-up plan: 2-3 months with Dr Veronika Perez or [address] [place] [address]       Reason for visit is follow-up left knee inflammatory monoarthritis    Encounter provider Emilia Dasilva DO    Provider located at [address] [id number]      Recent Visits  No visits were found meeting these conditions  Showing recent visits within past 7 days and meeting all other requirements     Future Appointments  No visits were found meeting these conditions  Showing future appointments within next 150 days and meeting all other requirements        After connecting through [employer], the patient was identified by name and date of birth  Kenny Vidal Sr  was informed that this is a telemedicine visit and that the visit is being conducted through telephone which may not be secure and therefore, might not be HIPAA-compliant  My office door was closed  No one else was in the room  He acknowledged consent and understanding of privacy and security of the video platform  The patient has agreed to participate and understands they can discontinue the visit at any time  Rheumatic Disease Summary  1   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   -Rheum consult 10/3/19 for left knee pain/swelling; previously saw Dr Jaspreet Vo around 2014 for 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 and tried plaquenil but stopped due to diarrhea; improvement with diclofenac and prednisone courses   -Labs 2015: ESTEFANÍA 40, negative dsDNA, prabhakar, RNP, SSA, SSB,   -Presented on pred 15mg and meloxicam   -Synovial fluid left knee 9/10/19: WBC 5,305, 26% neutrophils, negative crystals and Lyme PCR   -Labs 8/2019: negative RF, lyme, hep c   -MRI left knee 9/25/19: small joint effusion, no mention of synovitis, MCL sprain, no internal derangement noted   -XR hands/feet/SI joints 10/3/19: OA, no erosive inflammatory changes   -Labs 10/4/19: negative ESTEFANÍA, CCP, SSA, SSB, HLA-B27, RPR, GC/chlamydia, TB, HIV, hep panel, uric acid 6 7, CRP 5 7, ESR 31, vit D 18  -Initial visit: no IA on exam on pred 15mg, advised to wean to 10mg  -Visit 12/16/19: attempted left knee aspiration with minimal bloody fluid, injected kenalog, wean pred to 7 5mg then 5mg if able; no signs of a systemic IA   -Visit 1/15/20: improved temporarily with injection, pred down to 5mg but recurrent swelling/warmth; started sulfasalazine for possible inflammatory monoarthritis with goal of weaning off prednisone  -Visit 3/25/20 (telemedicine): doing better on SSZ just 500mg daily, try going to BID again and then D/C pred 2 5mg if able  2  Comorbidities: GERD     Subjective  Saravanan Lane is a 62 y o  male who presents for rheumatology follow-up via telemedicine for inflammatory monoarthritis of the left knee  He has had recurrent monoarthritis with inflammatory joint fluid on aspiration of his left knee which is very prednisone responsive  Suspicion has now been for a form of peripheral spondyloarthropathy with a monoarthritis manifestation  Last visit due to him using chronic prednisone for this we decided to start sulfasalazine for steroid sparing  Patient reports he is doing better on sulfasalazine and definitely feels it has been helping his knee    He has not had the recurrent swelling and the pain is sometimes 0 on certain days  He is on prednisone 2 5 mg now and he tapered from 5 mg to this dose about 4 weeks ago and reports no difficulty with doing this since starting the sulfasalazine  Previously he had significant difficulty getting the low prednisone 10 mg  The plan was to titrate up his dose of sulfasalazine to 1000 mg b i d , but he states he is currently actually only taking 500 mg once daily  He reports when he initially started the medication he had diarrhea for about 1 week on the once daily dosing and then this went away  He then increase to 1 tablet b i d  and had recurrent diarrhea again and so went back down to just 1 tablet daily  He is no longer having diarrhea since staying on the 1 tablet daily and does feel he can try increasing his dose again  We reviewed his recent blood work which is all stable with improved inflammatory markers on the sulfasalazine  He denies any new changes such as rashes or any other changes in his health        Past Medical History:   Diagnosis Date    Abnormal ECG     Last Assessed 2/29/2016    Acute non-recurrent maxillary sinusitis 11/22/2019    Calf swelling 9/26/2019    CAP (community acquired pneumonia) 10/12/2016       Past Surgical History:   Procedure Laterality Date    INGUINAL HERNIA REPAIR         Current Outpatient Medications   Medication Sig Dispense Refill    benzonatate (TESSALON) 200 MG capsule Take 1 capsule (200 mg total) by mouth 3 (three) times a day as needed for cough 20 capsule 0    ergocalciferol (VITAMIN D2) 50,000 units Take 1 capsule (50,000 Units total) by mouth once a week 12 capsule 0    omeprazole (PriLOSEC) 40 MG capsule Take 1 capsule (40 mg total) by mouth daily 30 capsule 5    predniSONE 5 mg tablet Take 1 tablet (5 mg total) by mouth daily 60 tablet 0    sulfaSALAzine (AZULFIDINE) 500 mg tablet 1 tab daily x7 days, then 1 tab BID x7 days, then 2 tabs BID thereafter 120 tablet 2     No current facility-administered medications for this visit  No Known Allergies    Review of Systems   Constitutional: Negative for fatigue and unexpected weight change  HENT: Negative for mouth sores  Respiratory: Negative for cough and shortness of breath  Gastrointestinal: Positive for diarrhea (now resolved)  Negative for constipation  Musculoskeletal: Positive for arthralgias  Negative for back pain, joint swelling and myalgias  Skin: Negative for color change and rash  Neurological: Negative for weakness  Psychiatric/Behavioral: Negative for sleep disturbance  Labs:   Component      Latest Ref Rng & Units 3/7/2020   White Blood Cell Count      3 8 - 10 8 Thousand/uL 10 7   Red Blood Cell Count      4 20 - 5 80 Million/uL 5 13   Hemoglobin      13 2 - 17 1 g/dL 15 1   HCT      38 5 - 50 0 % 45 6   MCV      80 0 - 100 0 fL 88 9   MCH      27 0 - 33 0 pg 29 4   MCHC        32 0 - 36 0 g/dL 33 1   RDW      11 0 - 15 0 % 13 3   Platelet Count      364 - 400 Thousand/uL 246   SL AMB MPV      7 5 - 12 5 fL 11 1   Neutrophils (Absolute)      1,500 - 7,800 cells/uL 6,270   Lymphocytes (Absolute)      850 - 3,900 cells/uL 3,082   Monocytes (Absolute)      200 - 950 cells/uL 1,027 (H)   Eosinophils (Absolute)      15 - 500 cells/uL 235   Basophils ABS      0 - 200 cells/uL 86   Neutrophils      % 58 6   Lymphocytes      % 28 8   Monocytes      % 9 6   Eosinophils      % 2 2   Basophils PCT      % 0 8   Glucose, Random      65 - 139 mg/dL 97   BUN      7 - 25 mg/dL 14   Creatinine      0 70 - 1 25 mg/dL 1 14   eGFR Non       > OR = 60 mL/min/1 73m2 68   eGFR       > OR = 60 mL/min/1 73m2 79   SL AMB BUN/CREATININE RATIO      6 - 22 (calc) NOT APPLICABLE   Sodium      135 - 146 mmol/L 138   Potassium      3 5 - 5 3 mmol/L 4 1   Chloride      98 - 110 mmol/L 104   CO2      20 - 32 mmol/L 25   Calcium      8 6 - 10 3 mg/dL 8 9   Total Protein      6 1 - 8 1 g/dL 6 3   Albumin      3 6 - 5 1 g/dL 4 1   Globulin      1 9 - 3 7 g/dL (calc) 2 2   Albumin/Globulin Ratio      1 0 - 2 5 (calc) 1 9   TOTAL BILIRUBIN      0 2 - 1 2 mg/dL 0 5   Alkaline Phosphatase      35 - 144 U/L 72   AST      10 - 35 U/L 17   ALT      9 - 46 U/L 24   Sed Rate      < OR = 20 mm/h 2   C-Reactive Protein, Quant      <8 0 mg/L 5 9     Component      Latest Ref Rng & Units 2/3/2020   Sed Rate      < OR = 20 mm/h 6   C-Reactive Protein, Quant      <8 0 mg/L 27 6 (H)       I spent 15 minutes with the patient during this visit

## 2020-06-09 LAB
ALBUMIN SERPL-MCNC: 4.1 G/DL (ref 3.6–5.1)
ALBUMIN/GLOB SERPL: 1.9 (CALC) (ref 1–2.5)
ALP SERPL-CCNC: 73 U/L (ref 35–144)
ALT SERPL-CCNC: 21 U/L (ref 9–46)
AST SERPL-CCNC: 19 U/L (ref 10–35)
BASOPHILS # BLD AUTO: 57 CELLS/UL (ref 0–200)
BASOPHILS NFR BLD AUTO: 0.7 %
BILIRUB SERPL-MCNC: 0.5 MG/DL (ref 0.2–1.2)
BUN SERPL-MCNC: 16 MG/DL (ref 7–25)
BUN/CREAT SERPL: NORMAL (CALC) (ref 6–22)
CALCIUM SERPL-MCNC: 9 MG/DL (ref 8.6–10.3)
CHLORIDE SERPL-SCNC: 104 MMOL/L (ref 98–110)
CO2 SERPL-SCNC: 28 MMOL/L (ref 20–32)
CREAT SERPL-MCNC: 1.08 MG/DL (ref 0.7–1.25)
CRP SERPL-MCNC: 12.9 MG/L
EOSINOPHIL # BLD AUTO: 189 CELLS/UL (ref 15–500)
EOSINOPHIL NFR BLD AUTO: 2.3 %
ERYTHROCYTE [DISTWIDTH] IN BLOOD BY AUTOMATED COUNT: 13.4 % (ref 11–15)
ERYTHROCYTE [SEDIMENTATION RATE] IN BLOOD BY WESTERGREN METHOD: 6 MM/H
GLOBULIN SER CALC-MCNC: 2.2 G/DL (CALC) (ref 1.9–3.7)
GLUCOSE SERPL-MCNC: 120 MG/DL (ref 65–139)
HCT VFR BLD AUTO: 46.8 % (ref 38.5–50)
HGB BLD-MCNC: 15.6 G/DL (ref 13.2–17.1)
LYMPHOCYTES # BLD AUTO: 2116 CELLS/UL (ref 850–3900)
LYMPHOCYTES NFR BLD AUTO: 25.8 %
MCH RBC QN AUTO: 29.5 PG (ref 27–33)
MCHC RBC AUTO-ENTMCNC: 33.3 G/DL (ref 32–36)
MCV RBC AUTO: 88.5 FL (ref 80–100)
MONOCYTES # BLD AUTO: 820 CELLS/UL (ref 200–950)
MONOCYTES NFR BLD AUTO: 10 %
NEUTROPHILS # BLD AUTO: 5018 CELLS/UL (ref 1500–7800)
NEUTROPHILS NFR BLD AUTO: 61.2 %
PLATELET # BLD AUTO: 242 THOUSAND/UL (ref 140–400)
PMV BLD REES-ECKER: 11.5 FL (ref 7.5–12.5)
POTASSIUM SERPL-SCNC: 4.2 MMOL/L (ref 3.5–5.3)
PROT SERPL-MCNC: 6.3 G/DL (ref 6.1–8.1)
RBC # BLD AUTO: 5.29 MILLION/UL (ref 4.2–5.8)
SL AMB EGFR AFRICAN AMERICAN: 84 ML/MIN/1.73M2
SL AMB EGFR NON AFRICAN AMERICAN: 73 ML/MIN/1.73M2
SODIUM SERPL-SCNC: 139 MMOL/L (ref 135–146)
WBC # BLD AUTO: 8.2 THOUSAND/UL (ref 3.8–10.8)

## 2020-06-30 ENCOUNTER — TELEPHONE (OUTPATIENT)
Dept: RHEUMATOLOGY | Facility: CLINIC | Age: 65
End: 2020-06-30

## 2020-06-30 ENCOUNTER — OFFICE VISIT (OUTPATIENT)
Dept: RHEUMATOLOGY | Facility: CLINIC | Age: 65
End: 2020-06-30
Payer: COMMERCIAL

## 2020-06-30 VITALS
HEIGHT: 67 IN | HEART RATE: 88 BPM | TEMPERATURE: 99.6 F | WEIGHT: 225 LBS | DIASTOLIC BLOOD PRESSURE: 80 MMHG | BODY MASS INDEX: 35.31 KG/M2 | SYSTOLIC BLOOD PRESSURE: 122 MMHG

## 2020-06-30 DIAGNOSIS — R60.0 LOWER EXTREMITY EDEMA: ICD-10-CM

## 2020-06-30 DIAGNOSIS — M47.819 PERIPHERAL SPONDYLOARTHRITIS: Primary | ICD-10-CM

## 2020-06-30 DIAGNOSIS — M19.90 INFLAMMATORY ARTHRITIS: ICD-10-CM

## 2020-06-30 DIAGNOSIS — Z79.899 ENCOUNTER FOR MONITORING SULFASALAZINE THERAPY: ICD-10-CM

## 2020-06-30 DIAGNOSIS — M13.162 MONOARTHRITIS OF LEFT KNEE: ICD-10-CM

## 2020-06-30 DIAGNOSIS — Z51.81 ENCOUNTER FOR MONITORING SULFASALAZINE THERAPY: ICD-10-CM

## 2020-06-30 PROCEDURE — 99214 OFFICE O/P EST MOD 30 MIN: CPT | Performed by: INTERNAL MEDICINE

## 2020-06-30 PROCEDURE — 3008F BODY MASS INDEX DOCD: CPT | Performed by: INTERNAL MEDICINE

## 2020-06-30 PROCEDURE — 4004F PT TOBACCO SCREEN RCVD TLK: CPT | Performed by: INTERNAL MEDICINE

## 2020-06-30 RX ORDER — SULFASALAZINE 500 MG/1
500 TABLET ORAL 2 TIMES DAILY
Qty: 180 TABLET | Refills: 1 | Status: SHIPPED | OUTPATIENT
Start: 2020-06-30 | End: 2020-12-15 | Stop reason: SDUPTHER

## 2020-08-13 ENCOUNTER — OFFICE VISIT (OUTPATIENT)
Dept: FAMILY MEDICINE CLINIC | Facility: CLINIC | Age: 65
End: 2020-08-13
Payer: COMMERCIAL

## 2020-08-13 VITALS
HEIGHT: 67 IN | DIASTOLIC BLOOD PRESSURE: 86 MMHG | BODY MASS INDEX: 35.79 KG/M2 | WEIGHT: 228 LBS | TEMPERATURE: 98.8 F | SYSTOLIC BLOOD PRESSURE: 138 MMHG

## 2020-08-13 DIAGNOSIS — R60.9 EDEMA, UNSPECIFIED TYPE: Primary | ICD-10-CM

## 2020-08-13 DIAGNOSIS — I10 ESSENTIAL HYPERTENSION: ICD-10-CM

## 2020-08-13 PROBLEM — B34.9 ACUTE VIRAL SYNDROME: Status: RESOLVED | Noted: 2020-02-07 | Resolved: 2020-08-13

## 2020-08-13 PROBLEM — R63.4 WEIGHT LOSS: Status: RESOLVED | Noted: 2019-02-11 | Resolved: 2020-08-13

## 2020-08-13 PROCEDURE — 99214 OFFICE O/P EST MOD 30 MIN: CPT | Performed by: FAMILY MEDICINE

## 2020-08-13 PROCEDURE — 3008F BODY MASS INDEX DOCD: CPT | Performed by: FAMILY MEDICINE

## 2020-08-13 PROCEDURE — 4004F PT TOBACCO SCREEN RCVD TLK: CPT | Performed by: FAMILY MEDICINE

## 2020-08-13 PROCEDURE — 3079F DIAST BP 80-89 MM HG: CPT | Performed by: FAMILY MEDICINE

## 2020-08-13 PROCEDURE — 3725F SCREEN DEPRESSION PERFORMED: CPT | Performed by: FAMILY MEDICINE

## 2020-08-13 PROCEDURE — 3075F SYST BP GE 130 - 139MM HG: CPT | Performed by: FAMILY MEDICINE

## 2020-08-13 NOTE — PROGRESS NOTES
Assessment/Plan:  Edema  The patient has developed some progressive edema  Does not have chest pain, shortness of breath, PND orthopnea X cetera  He does have a history of grade 1 diastolic dysfunction by echocardiogram in 2016  His blood pressure is elevated today  I believe we need to get a repeat echocardiogram for further evaluation  Additionally we are going to ask him to get a chest x-ray to rule out intrathoracic process  Additionally we are going to get a TSH as he has not had 1 performed recently  He did have blood work through Rheumatology earlier in the summer with normal CMP and CBC  We will follow up with him when results of the testing are available  In the meantime he will call or seek more urgent medical attention should he develop any chest pain shortness of breath or other progression of his condition  He agrees with this plan  Essential hypertension  Patient's blood pressure was somewhat elevated today  Will await follow-up visit for confirmation before giving him official diagnosis of hypertension  Diagnoses and all orders for this visit:    Edema, unspecified type  -     Echo complete with contrast if indicated; Future  -     XR chest pa & lateral; Future  -     TSH, 3rd generation with Free T4 reflex    Essential hypertension  -     Echo complete with contrast if indicated; Future  -     XR chest pa & lateral; Future  -     TSH, 3rd generation with Free T4 reflex          Subjective:   Chief Complaint   Patient presents with    Bilateral LE edema  Progressively getting worse for the pas        Patient ID: Jessica Coello  is a 62 y o  male  HPI  The patient is a 61-year-old male with history of hyperlipidemia, BPH, gastroesophageal reflux disease it in addition to inflammatory arthritis who presents today with concerns over progressive swelling of his lower extremities  He 1st noted them nearly a year ago  It was mild for quite some time    Recently this summer he has noted progressive worsening of the edema  It is fairly equal bilaterally  There is some dependency to it though it does not completely resolve over night  He denies any chest pain or shortness of breath  He denies PND orthopnea or edema  No tachycardia lightheadedness dizziness headache X cetera  No fevers or chills  The following portions of the patient's history were reviewed and updated as appropriate: allergies, current medications, past family history, past medical history, past social history, past surgical history and problem list     Review of Systems   Constitution: Negative for chills, decreased appetite, fever, malaise/fatigue, weight gain and weight loss  HENT: Positive for congestion  No ocular pruritus  Cardiovascular: Positive for irregular heartbeat and leg swelling  Negative for chest pain, orthopnea and paroxysmal nocturnal dyspnea  Respiratory: Positive for cough  Endocrine: Negative for cold intolerance, heat intolerance, polydipsia, polyphagia and polyuria  Hematologic/Lymphatic: Negative for adenopathy and bleeding problem  Does not bruise/bleed easily  Gastrointestinal: Negative for change in bowel habit, constipation, diarrhea and hematochezia  Genitourinary: Positive for nocturia  Negative for dysuria, frequency and hematuria  Nocturia x 2   Neurological: Negative for dizziness and headaches  Objective:    Physical Exam   Constitutional: He is oriented to person, place, and time  He appears well-developed  Obese and in no distress   Neck: No JVD present  No thyromegaly present  Cardiovascular: Normal rate and regular rhythm  Exam reveals no gallop  No murmur heard  Pulmonary/Chest: Effort normal and breath sounds normal    Musculoskeletal:         General: Edema present  Comments: The patient has 1+ edema from the upper calf region to the feet  He has no calf tenderness  He has no cord  He has no erythema or induration  Lymphadenopathy:     He has no cervical adenopathy  Neurological: He is alert and oriented to person, place, and time  Skin: No rash noted  No erythema  Psychiatric:   Somewhat dysphoric appearance which is chronic   Nursing note and vitals reviewed        Wt Readings from Last 12 Encounters:   08/13/20 103 kg (228 lb)   06/30/20 102 kg (225 lb)   02/07/20 102 kg (225 lb)   01/15/20 102 kg (225 lb)   12/31/19 102 kg (225 lb)   12/16/19 103 kg (227 lb 3 2 oz)   11/22/19 101 kg (222 lb)   10/03/19 98 4 kg (217 lb)   10/02/19 98 4 kg (217 lb)   09/26/19 98 4 kg (217 lb)   09/17/19 96 2 kg (212 lb)   09/10/19 96 2 kg (212 lb)   ]

## 2020-08-13 NOTE — ASSESSMENT & PLAN NOTE
Patient's blood pressure was somewhat elevated today  Will await follow-up visit for confirmation before giving him official diagnosis of hypertension

## 2020-08-13 NOTE — ASSESSMENT & PLAN NOTE
The patient has developed some progressive edema  Does not have chest pain, shortness of breath, PND orthopnea X cetera  He does have a history of grade 1 diastolic dysfunction by echocardiogram in 2016  His blood pressure is elevated today  I believe we need to get a repeat echocardiogram for further evaluation  Additionally we are going to ask him to get a chest x-ray to rule out intrathoracic process  Additionally we are going to get a TSH as he has not had 1 performed recently  He did have blood work through Rheumatology earlier in the summer with normal CMP and CBC  We will follow up with him when results of the testing are available  In the meantime he will call or seek more urgent medical attention should he develop any chest pain shortness of breath or other progression of his condition  He agrees with this plan

## 2020-08-15 LAB — TSH SERPL-ACNC: 1.8 MIU/L (ref 0.4–4.5)

## 2020-09-05 DIAGNOSIS — K21.9 GASTROESOPHAGEAL REFLUX DISEASE, ESOPHAGITIS PRESENCE NOT SPECIFIED: ICD-10-CM

## 2020-09-08 RX ORDER — OMEPRAZOLE 40 MG/1
CAPSULE, DELAYED RELEASE ORAL
Qty: 90 CAPSULE | Refills: 2 | Status: SHIPPED | OUTPATIENT
Start: 2020-09-08 | End: 2021-06-01

## 2020-09-14 ENCOUNTER — HOSPITAL ENCOUNTER (OUTPATIENT)
Dept: NON INVASIVE DIAGNOSTICS | Age: 65
Discharge: HOME | End: 2020-09-14
Payer: COMMERCIAL

## 2020-09-14 DIAGNOSIS — R60.9 EDEMA, UNSPECIFIED TYPE: ICD-10-CM

## 2020-09-14 DIAGNOSIS — I10 ESSENTIAL HYPERTENSION: ICD-10-CM

## 2020-09-14 PROCEDURE — 93306 TTE W/DOPPLER COMPLETE: CPT | Performed by: INTERNAL MEDICINE

## 2020-09-14 PROCEDURE — 93306 TTE W/DOPPLER COMPLETE: CPT

## 2020-09-18 DIAGNOSIS — R60.9 EDEMA, UNSPECIFIED TYPE: Primary | ICD-10-CM

## 2020-09-18 DIAGNOSIS — I10 ESSENTIAL HYPERTENSION: ICD-10-CM

## 2020-09-19 ENCOUNTER — HOSPITAL ENCOUNTER (OUTPATIENT)
Dept: RADIOLOGY | Facility: HOSPITAL | Age: 65
Discharge: HOME | End: 2020-09-19
Payer: COMMERCIAL

## 2020-09-19 DIAGNOSIS — I10 ESSENTIAL HYPERTENSION: ICD-10-CM

## 2020-09-19 DIAGNOSIS — R60.9 EDEMA, UNSPECIFIED TYPE: ICD-10-CM

## 2020-09-19 PROCEDURE — 71046 X-RAY EXAM CHEST 2 VIEWS: CPT

## 2020-10-06 ENCOUNTER — TELEPHONE (OUTPATIENT)
Dept: RHEUMATOLOGY | Facility: CLINIC | Age: 65
End: 2020-10-06

## 2020-10-06 ENCOUNTER — OFFICE VISIT (OUTPATIENT)
Dept: RHEUMATOLOGY | Facility: CLINIC | Age: 65
End: 2020-10-06
Payer: COMMERCIAL

## 2020-10-06 DIAGNOSIS — M13.162 MONOARTHRITIS OF LEFT KNEE: ICD-10-CM

## 2020-10-06 DIAGNOSIS — M47.819 PERIPHERAL SPONDYLOARTHRITIS: Primary | ICD-10-CM

## 2020-10-06 DIAGNOSIS — Z79.899 ENCOUNTER FOR MONITORING SULFASALAZINE THERAPY: ICD-10-CM

## 2020-10-06 DIAGNOSIS — Z51.81 ENCOUNTER FOR MONITORING SULFASALAZINE THERAPY: ICD-10-CM

## 2020-10-06 PROCEDURE — 99213 OFFICE O/P EST LOW 20 MIN: CPT | Performed by: INTERNAL MEDICINE

## 2020-10-06 RX ORDER — ATORVASTATIN CALCIUM 10 MG/1
TABLET, FILM COATED ORAL
COMMUNITY
Start: 2020-09-16 | End: 2020-10-13 | Stop reason: ALTCHOICE

## 2020-10-06 RX ORDER — CLOPIDOGREL BISULFATE 75 MG/1
TABLET ORAL
COMMUNITY
Start: 2020-08-19 | End: 2020-10-13 | Stop reason: ALTCHOICE

## 2020-10-06 RX ORDER — NITROGLYCERIN 0.4 MG/1
TABLET SUBLINGUAL
COMMUNITY
Start: 2020-09-11 | End: 2020-10-13 | Stop reason: ALTCHOICE

## 2020-10-06 RX ORDER — METOPROLOL SUCCINATE 100 MG/1
TABLET, EXTENDED RELEASE ORAL
COMMUNITY
Start: 2020-09-24 | End: 2020-10-13 | Stop reason: ALTCHOICE

## 2020-10-06 RX ORDER — ISOSORBIDE MONONITRATE 120 MG/1
TABLET, EXTENDED RELEASE ORAL
COMMUNITY
Start: 2020-09-29 | End: 2020-10-13 | Stop reason: ALTCHOICE

## 2020-10-06 RX ORDER — FLUOROURACIL 50 MG/G
CREAM TOPICAL
COMMUNITY
Start: 2020-09-14 | End: 2020-10-13 | Stop reason: ALTCHOICE

## 2020-10-10 ENCOUNTER — LAB (OUTPATIENT)
Dept: LAB | Facility: CLINIC | Age: 65
End: 2020-10-10
Payer: COMMERCIAL

## 2020-10-10 ENCOUNTER — TRANSCRIBE ORDERS (OUTPATIENT)
Dept: LAB | Facility: CLINIC | Age: 65
End: 2020-10-10

## 2020-10-10 DIAGNOSIS — Z79.899 ENCOUNTER FOR MONITORING SULFASALAZINE THERAPY: ICD-10-CM

## 2020-10-10 DIAGNOSIS — Z51.81 ENCOUNTER FOR MONITORING SULFASALAZINE THERAPY: ICD-10-CM

## 2020-10-10 DIAGNOSIS — M47.819 PERIPHERAL SPONDYLOARTHRITIS: ICD-10-CM

## 2020-10-10 LAB
ALBUMIN SERPL BCP-MCNC: 3.8 G/DL (ref 3.5–5)
ALP SERPL-CCNC: 79 U/L (ref 46–116)
ALT SERPL W P-5'-P-CCNC: 30 U/L (ref 12–78)
ANION GAP SERPL CALCULATED.3IONS-SCNC: 9 MMOL/L (ref 4–13)
AST SERPL W P-5'-P-CCNC: 18 U/L (ref 5–45)
BASOPHILS # BLD AUTO: 0.08 THOUSANDS/ΜL (ref 0–0.1)
BASOPHILS NFR BLD AUTO: 1 % (ref 0–1)
BILIRUB SERPL-MCNC: 0.56 MG/DL (ref 0.2–1)
BUN SERPL-MCNC: 14 MG/DL (ref 5–25)
CALCIUM SERPL-MCNC: 8.7 MG/DL (ref 8.3–10.1)
CHLORIDE SERPL-SCNC: 104 MMOL/L (ref 100–108)
CO2 SERPL-SCNC: 26 MMOL/L (ref 21–32)
CREAT SERPL-MCNC: 1.12 MG/DL (ref 0.6–1.3)
CRP SERPL QL: 11.2 MG/L
EOSINOPHIL # BLD AUTO: 0.22 THOUSAND/ΜL (ref 0–0.61)
EOSINOPHIL NFR BLD AUTO: 3 % (ref 0–6)
ERYTHROCYTE [DISTWIDTH] IN BLOOD BY AUTOMATED COUNT: 13.8 % (ref 11.6–15.1)
ERYTHROCYTE [SEDIMENTATION RATE] IN BLOOD: 11 MM/HOUR (ref 0–19)
GFR SERPL CREATININE-BSD FRML MDRD: 70 ML/MIN/1.73SQ M
GLUCOSE P FAST SERPL-MCNC: 104 MG/DL (ref 65–99)
HCT VFR BLD AUTO: 48.3 % (ref 36.5–49.3)
HGB BLD-MCNC: 15.8 G/DL (ref 12–17)
IMM GRANULOCYTES # BLD AUTO: 0.04 THOUSAND/UL (ref 0–0.2)
IMM GRANULOCYTES NFR BLD AUTO: 1 % (ref 0–2)
LYMPHOCYTES # BLD AUTO: 2.61 THOUSANDS/ΜL (ref 0.6–4.47)
LYMPHOCYTES NFR BLD AUTO: 33 % (ref 14–44)
MCH RBC QN AUTO: 29.9 PG (ref 26.8–34.3)
MCHC RBC AUTO-ENTMCNC: 32.7 G/DL (ref 31.4–37.4)
MCV RBC AUTO: 92 FL (ref 82–98)
MONOCYTES # BLD AUTO: 0.86 THOUSAND/ΜL (ref 0.17–1.22)
MONOCYTES NFR BLD AUTO: 11 % (ref 4–12)
NEUTROPHILS # BLD AUTO: 4.12 THOUSANDS/ΜL (ref 1.85–7.62)
NEUTS SEG NFR BLD AUTO: 51 % (ref 43–75)
NRBC BLD AUTO-RTO: 0 /100 WBCS
PLATELET # BLD AUTO: 250 THOUSANDS/UL (ref 149–390)
PMV BLD AUTO: 11.4 FL (ref 8.9–12.7)
POTASSIUM SERPL-SCNC: 4.4 MMOL/L (ref 3.5–5.3)
PROT SERPL-MCNC: 7.2 G/DL (ref 6.4–8.2)
RBC # BLD AUTO: 5.28 MILLION/UL (ref 3.88–5.62)
SODIUM SERPL-SCNC: 139 MMOL/L (ref 136–145)
WBC # BLD AUTO: 7.93 THOUSAND/UL (ref 4.31–10.16)

## 2020-10-10 PROCEDURE — 80053 COMPREHEN METABOLIC PANEL: CPT

## 2020-10-10 PROCEDURE — 36415 COLL VENOUS BLD VENIPUNCTURE: CPT

## 2020-10-10 PROCEDURE — 85025 COMPLETE CBC W/AUTO DIFF WBC: CPT

## 2020-10-10 PROCEDURE — 85652 RBC SED RATE AUTOMATED: CPT

## 2020-10-10 PROCEDURE — 86140 C-REACTIVE PROTEIN: CPT

## 2020-10-13 ENCOUNTER — CONSULT (OUTPATIENT)
Dept: CARDIOLOGY CLINIC | Facility: CLINIC | Age: 65
End: 2020-10-13
Payer: COMMERCIAL

## 2020-10-13 VITALS
SYSTOLIC BLOOD PRESSURE: 142 MMHG | WEIGHT: 228 LBS | HEART RATE: 70 BPM | TEMPERATURE: 97.7 F | HEIGHT: 67 IN | BODY MASS INDEX: 35.79 KG/M2 | DIASTOLIC BLOOD PRESSURE: 80 MMHG

## 2020-10-13 DIAGNOSIS — I10 ESSENTIAL HYPERTENSION: ICD-10-CM

## 2020-10-13 DIAGNOSIS — R60.9 EDEMA, UNSPECIFIED TYPE: ICD-10-CM

## 2020-10-13 PROCEDURE — 93000 ELECTROCARDIOGRAM COMPLETE: CPT | Performed by: INTERNAL MEDICINE

## 2020-10-13 PROCEDURE — 99243 OFF/OP CNSLTJ NEW/EST LOW 30: CPT | Performed by: INTERNAL MEDICINE

## 2020-10-13 PROCEDURE — 3079F DIAST BP 80-89 MM HG: CPT | Performed by: INTERNAL MEDICINE

## 2020-10-13 PROCEDURE — 3077F SYST BP >= 140 MM HG: CPT | Performed by: INTERNAL MEDICINE

## 2020-10-13 PROCEDURE — 1036F TOBACCO NON-USER: CPT | Performed by: INTERNAL MEDICINE

## 2020-10-13 RX ORDER — BENAZEPRIL HYDROCHLORIDE AND HYDROCHLOROTHIAZIDE 5; 6.25 MG/1; MG/1
1 TABLET ORAL DAILY
Qty: 30 TABLET | Refills: 5 | Status: SHIPPED | OUTPATIENT
Start: 2020-10-13 | End: 2021-03-02

## 2020-11-14 ENCOUNTER — OFFICE VISIT (OUTPATIENT)
Dept: FAMILY MEDICINE CLINIC | Facility: CLINIC | Age: 65
End: 2020-11-14
Payer: COMMERCIAL

## 2020-11-14 VITALS
HEIGHT: 67 IN | BODY MASS INDEX: 35.47 KG/M2 | TEMPERATURE: 98.7 F | SYSTOLIC BLOOD PRESSURE: 136 MMHG | WEIGHT: 226 LBS | DIASTOLIC BLOOD PRESSURE: 80 MMHG

## 2020-11-14 DIAGNOSIS — J01.00 ACUTE MAXILLARY SINUSITIS, RECURRENCE NOT SPECIFIED: Primary | ICD-10-CM

## 2020-11-14 PROCEDURE — 1036F TOBACCO NON-USER: CPT | Performed by: FAMILY MEDICINE

## 2020-11-14 PROCEDURE — 3008F BODY MASS INDEX DOCD: CPT | Performed by: FAMILY MEDICINE

## 2020-11-14 PROCEDURE — 99213 OFFICE O/P EST LOW 20 MIN: CPT | Performed by: FAMILY MEDICINE

## 2020-11-14 PROCEDURE — 3075F SYST BP GE 130 - 139MM HG: CPT | Performed by: FAMILY MEDICINE

## 2020-11-14 PROCEDURE — 3079F DIAST BP 80-89 MM HG: CPT | Performed by: FAMILY MEDICINE

## 2020-11-14 RX ORDER — AZITHROMYCIN 250 MG/1
TABLET, FILM COATED ORAL
Qty: 6 TABLET | Refills: 0 | Status: SHIPPED | OUTPATIENT
Start: 2020-11-14 | End: 2020-11-18

## 2020-11-14 RX ORDER — METHYLPREDNISOLONE 4 MG/1
TABLET ORAL
Qty: 21 EACH | Refills: 0 | Status: SHIPPED | OUTPATIENT
Start: 2020-11-14 | End: 2021-03-09

## 2020-11-19 ENCOUNTER — LAB (OUTPATIENT)
Dept: LAB | Facility: CLINIC | Age: 65
End: 2020-11-19
Payer: COMMERCIAL

## 2020-11-19 ENCOUNTER — TRANSCRIBE ORDERS (OUTPATIENT)
Dept: LAB | Facility: CLINIC | Age: 65
End: 2020-11-19

## 2020-11-19 LAB
ANION GAP SERPL CALCULATED.3IONS-SCNC: 11 MMOL/L (ref 4–13)
BUN SERPL-MCNC: 16 MG/DL (ref 5–25)
CALCIUM SERPL-MCNC: 8.5 MG/DL (ref 8.3–10.1)
CHLORIDE SERPL-SCNC: 102 MMOL/L (ref 100–108)
CO2 SERPL-SCNC: 25 MMOL/L (ref 21–32)
CREAT SERPL-MCNC: 1.23 MG/DL (ref 0.6–1.3)
GFR SERPL CREATININE-BSD FRML MDRD: 62 ML/MIN/1.73SQ M
GLUCOSE P FAST SERPL-MCNC: 106 MG/DL (ref 65–99)
POTASSIUM SERPL-SCNC: 3.7 MMOL/L (ref 3.5–5.3)
SODIUM SERPL-SCNC: 138 MMOL/L (ref 136–145)

## 2020-11-19 PROCEDURE — 80048 BASIC METABOLIC PNL TOTAL CA: CPT | Performed by: INTERNAL MEDICINE

## 2020-11-19 PROCEDURE — 36415 COLL VENOUS BLD VENIPUNCTURE: CPT | Performed by: INTERNAL MEDICINE

## 2020-11-24 ENCOUNTER — TELEPHONE (OUTPATIENT)
Dept: CARDIOLOGY CLINIC | Facility: CLINIC | Age: 65
End: 2020-11-24

## 2020-11-25 ENCOUNTER — HOSPITAL ENCOUNTER (OUTPATIENT)
Dept: NON INVASIVE DIAGNOSTICS | Facility: CLINIC | Age: 65
Discharge: HOME | End: 2020-11-25
Payer: COMMERCIAL

## 2020-11-25 DIAGNOSIS — I10 ESSENTIAL HYPERTENSION: ICD-10-CM

## 2020-11-25 DIAGNOSIS — R60.9 EDEMA, UNSPECIFIED TYPE: ICD-10-CM

## 2020-11-25 LAB
CHEST PAIN STATEMENT: NORMAL
MAX DIASTOLIC BP: 88 MMHG
MAX HEART RATE: 141 BPM
MAX PREDICTED HEART RATE: 156 BPM
MAX. SYSTOLIC BP: 152 MMHG
PROTOCOL NAME: NORMAL
REASON FOR TERMINATION: NORMAL
TARGET HR FORMULA: NORMAL
TEST INDICATION: NORMAL
TIME IN EXERCISE PHASE: NORMAL

## 2020-11-25 PROCEDURE — 93350 STRESS TTE ONLY: CPT

## 2020-11-25 PROCEDURE — 93351 STRESS TTE COMPLETE: CPT | Performed by: INTERNAL MEDICINE

## 2020-11-30 ENCOUNTER — TELEPHONE (OUTPATIENT)
Dept: CARDIOLOGY CLINIC | Facility: CLINIC | Age: 65
End: 2020-11-30

## 2020-12-15 DIAGNOSIS — M19.90 INFLAMMATORY ARTHRITIS: ICD-10-CM

## 2020-12-15 RX ORDER — SULFASALAZINE 500 MG/1
TABLET ORAL
Qty: 180 TABLET | Refills: 1 | Status: SHIPPED | OUTPATIENT
Start: 2020-12-15 | End: 2021-06-11 | Stop reason: SDUPTHER

## 2021-03-02 DIAGNOSIS — I10 ESSENTIAL HYPERTENSION: ICD-10-CM

## 2021-03-02 DIAGNOSIS — R60.9 EDEMA, UNSPECIFIED TYPE: ICD-10-CM

## 2021-03-02 RX ORDER — BENAZEPRIL HYDROCHLORIDE AND HYDROCHLOROTHIAZIDE 5; 6.25 MG/1; MG/1
TABLET ORAL
Qty: 90 TABLET | Refills: 1 | Status: SHIPPED | OUTPATIENT
Start: 2021-03-02 | End: 2021-03-15 | Stop reason: SINTOL

## 2021-03-07 NOTE — PROGRESS NOTES
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, who presents for a follow-up  [facility] is currently on sulfasalazine 500 mg twice daily        - Wojciech presents for a follow-up of peripheral spondyloarthritis primarily affecting his left knee which has been well controlled with the use of sulfasalazine 500 mg twice daily  Kristyn Mcgowan will continue with this medication with the dose unchanged  [facility] was unable to tolerate higher doses due to diarrhea   He is due for high risk medication lab monitoring      - In regards to the chronic bilateral hand and foot pain, there does not appear to be a clear etiology for the symptoms, but they do not seem inflammatory in nature  The hand and foot x-rays were normal and there is no synovitis noted on his examination  I advised him we can trial an alternate NSAID and I prescribed him diclofenac 75 mg twice a day as needed  He is aware not to combine this with any over-the-counter NSAIDs     - From a rheumatology perspective he appears to be doing well at this time, and will follow up for a routine office visit in 6 months   I advised him to call in the interim with any concerns  Plan:  Diagnoses and all orders for this visit:    Peripheral spondyloarthritis  -     diclofenac (VOLTAREN) 75 mg EC tablet; Take 1 tablet (75 mg total) by mouth 2 (two) times a day as needed (Joint pain)    Encounter for monitoring sulfasalazine therapy  -     CBC and differential; Future  -     Comprehensive metabolic panel; Future  -     C-reactive protein; Future  -     Sedimentation rate, automated; Future    Other orders  -     clopidogrel (PLAVIX) 75 mg tablet  -     ketoconazole (NIZORAL) 2 % cream  -     metoprolol succinate (TOPROL-XL) 100 mg 24 hr tablet      Activities as tolerated  Exercise: try to maintain a low impact exercise regimen as much as possible   Walk for 30 minutes a day for at least 3 days a week  Continue other medications as prescribed by PCP and other specialists  RTC in 6 months  HPI      Rheumatic Disease Summary  1  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   -Rheum consult 10/3/19 for left knee pain/swelling; previously saw Dr Massie Ganser around 2014 for 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 and tried plaquenil but stopped due to diarrhea; improvement with diclofenac and prednisone courses   -Labs 2015: ESTEFANÍA 40, negative dsDNA, prabhakar, RNP, SSA, SSB,   -Presented on pred 15mg and meloxicam   -Synovial fluid left knee 9/10/19: WBC 5,305, 26% neutrophils, negative crystals and Lyme PCR   -Labs 8/2019: negative RF, lyme, hep c   -MRI left knee 9/25/19: small joint effusion, no mention of synovitis, MCL sprain, no internal derangement noted   -XR hands/feet/SI joints 10/3/19: OA, no erosive inflammatory changes   -Labs 10/4/19: negative ESTEFANÍA, CCP, SSA, SSB, HLA-B27, RPR, GC/chlamydia, TB, HIV, hep panel, uric acid 6 7, CRP 5 7, ESR 31, vit D 18  -Initial visit: no IA on exam on pred 15mg, advised to wean to 10mg  -Visit 12/16/19: attempted left knee aspiration with minimal bloody fluid, injected kenalog, wean pred to 7 5mg then 5mg if able; no signs of a systemic IA   -Visit 1/15/20: improved temporarily with injection, pred down to 5mg but recurrent swelling/warmth; started sulfasalazine for possible inflammatory monoarthritis with goal of weaning off prednisone  -Visit 3/25/20 (telemedicine): doing better on SSZ just 500mg daily, try going to BID again and then D/C pred 2 5mg if able  -Visit 6/30/20:  mg BID, off prednisone  Reports LE edema - follow up with PCP   - 3/9/21: continue SSZ  2  Comorbidities: GERD         Subjective (INITIAL VISIT NOTE WITH ME IN 6/2020):  Mr Brian Gill Sr  is a 46-year-old  male with history significant for a 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, who presents for a follow-up  Abdias Faustin is currently on sulfasalazine 500 mg twice daily  Abdias Faustin is a patient of Dr Stephan George was last seen for a visit on 03/25/2020  Rustam Wang did review his recently done CBC, CMP and ESR which were unremarkable   His CRP was elevated at 12       He reports following his last visit he was able to discontinue the prednisone without a flare-up of symptoms  Abdias Faustin has been feeling well on the sulfasalazine 500 mg twice daily and states that he is tolerating the medication well and also controlling his symptoms of the left knee pain and swelling   Over the past 1 month he has noticed swelling affecting his bilateral ankles and feet, which he feels is creeping up his legs   He reports a sensation of fullness and throbbing, but denies any specific joint pains through his ankles   He reports chronic pain at the tips of his fingers and toes, but denies any other joint pains or swelling   He reports diffuse morning stiffness which improves quickly upon activities  Abdias Faustin is not taking any over-the-counter pain medications      He reports undergoing cardiac workup in 2016, with an echocardiogram showing grade 1 diastolic dysfunction         10/6/2020:  Patient presents for a follow-up of peripheral spondyloarthritis  He is currently on sulfasalazine 500 mg twice daily  I reviewed his labs done following the last office visit which showed an unremarkable CBC and CMP     He has overall done well on the sulfasalazine and states that the left knee has been stable  He has not experienced any significant recurrent pain or swelling  He has noticed mild pain in his left hip region as well as occasionally in his fingers and toes, but denies any other significant joint pains  He is continuing to note swelling in his lower legs but states over the past few weeks this has slightly improved    He was seen by his primary care physician and had a chest x-ray, echocardiogram and vascular studies of his legs done which were mostly unrevealing except for grade 1 diastolic dysfunction on the echo  He is scheduled to see Cardiology next week  No other joint swelling  He experiences a sensation of diffuse morning stiffness which improves quickly with activities  He is not taking any over-the-counter pain medications      He has been tolerating the sulfasalazine well without any concerns for side effects or infections  3/9/2021:  Patient presents for a follow-up of peripheral spondyloarthritis  He is currently on sulfasalazine 500 mg twice daily  He is due for labs  He reports in terms of the spondyloarthritis symptoms have been stable and he has not had any flare-ups of the knee pain or swelling  He does report mild activity related knee and ankle pain, but this is minor  He reports constant pain in his hands and feet which have been occurring for the past 5 years  This usually flares up with the cold weather  He has tried multiple over-the-counter pain medications and even steroids without any relief  No other joint pains  No joint swelling or morning stiffness  He has been tolerating the sulfasalazine well without any concerns for side effects or infections  The following portions of the patient's history were reviewed and updated as appropriate: allergies, current medications, past family history, past medical history, past social history, past surgical history and problem list       Review of Systems  Constitutional: Negative for weight change, fevers, chills, night sweats, fatigue  ENT/Mouth: Negative for hearing changes, ear pain, nasal congestion, sinus pain, hoarseness, sore throat, rhinorrhea, swallowing difficulty  Eyes: Negative for pain, redness, discharge, vision changes  Cardiovascular: Negative for chest pain, SOB, palpitations  Respiratory: Negative for cough, sputum, wheezing, dyspnea  Gastrointestinal: Negative for nausea, vomiting, diarrhea, constipation, pain, heartburn    Genitourinary: Negative for dysuria, urinary frequency, hematuria  Musculoskeletal: As per HPI  Skin: Negative for skin rash, color changes  Neuro: Negative for weakness, numbness, tingling, loss of consciousness  Psych: Negative for anxiety, depression  Heme/Lymph: Negative for easy bruising, bleeding, lymphadenopathy          Past Medical History:   Diagnosis Date    Abnormal ECG     Last Assessed 2/29/2016    Acute non-recurrent maxillary sinusitis 11/22/2019    Acute viral syndrome 2/7/2020    Calf swelling 9/26/2019    CAP (community acquired pneumonia) 10/12/2016    Weight loss 2/11/2019       Past Surgical History:   Procedure Laterality Date    INGUINAL HERNIA REPAIR         Social History     Socioeconomic History    Marital status: /Civil Union     Spouse name: Not on file    Number of children: Not on file    Years of education: Not on file    Highest education level: Not on file   Occupational History    Not on file   Social Needs    Financial resource strain: Not on file    Food insecurity     Worry: Not on file     Inability: Not on file   [employer] needs     Medical: Not on file     Non-medical: Not on file   Tobacco Use    Smoking status: Former Smoker    Smokeless tobacco: Current User     Types: Chew   Substance and Sexual Activity    Alcohol use: Yes     Frequency: 2-3 times a week     Drinks per session: 3 or 4     Binge frequency: Monthly    Drug use: No    Sexual activity: Yes     Partners: Female     Comment: Wife   Lifestyle    Physical activity     Days per week: Not on file     Minutes per session: Not on file    Stress: Not on file   Relationships    Social connections     Talks on phone: Not on file     Gets together: Not on file     Attends [employer] service: Not on file     Active member of club or organization: Not on file     Attends meetings of clubs or organizations: Not on file     Relationship status: Not on file    Intimate partner violence     Fear of current or ex partner: Not on file     Emotionally abused: Not on file     Physically abused: Not on file     Forced sexual activity: Not on file   Other Topics Concern    Not on file   Social History Narrative    Not on file       Family History   Problem Relation Age of Onset    Cancer Family     Colon cancer Father     COPD Father        No Known Allergies      Current Outpatient Medications:     benazepril-hydrochlorthiazide (LOTENSIN HCT) 5-6 25 MG per tablet, TAKE 1 TABLET BY MOUTH EVERY DAY, Disp: 90 tablet, Rfl: 1    clopidogrel (PLAVIX) 75 mg tablet, , Disp: , Rfl:     diclofenac (VOLTAREN) 75 mg EC tablet, Take 1 tablet (75 mg total) by mouth 2 (two) times a day as needed (Joint pain), Disp: 60 tablet, Rfl: 0    ergocalciferol (VITAMIN D2) 50,000 units, Take 1 capsule (50,000 Units total) by mouth once a week, Disp: 12 capsule, Rfl: 0    ketoconazole (NIZORAL) 2 % cream, , Disp: , Rfl:     metoprolol succinate (TOPROL-XL) 100 mg 24 hr tablet, , Disp: , Rfl:     omeprazole (PriLOSEC) 40 MG capsule, TAKE 1 CAPSULE BY MOUTH EVERY DAY, Disp: 90 capsule, Rfl: 2    sulfaSALAzine (AZULFIDINE) 500 mg tablet, TAKE 1 TABLET BY MOUTH TWICE A DAY, Disp: 180 tablet, Rfl: 1      Objective:    Vitals:    03/09/21 1513   BP: 132/84   Pulse: 94       Physical Exam  General: Well appearing, well nourished, in no distress  Oriented x 3, normal mood and affect  Ambulating without difficulty  Skin: Good turgor, no rash, unusual bruising or prominent lesions  Hair: Normal texture and distribution  Nails: Normal color, no deformities  HEENT:  Head: Normocephalic, atraumatic  Eyes: Conjunctiva clear, sclera non-icteric, EOM intact  Extremities: No amputations or deformities, cyanosis, edema  Musculoskeletal:   No peripheral joint soft tissue swelling or tenderness noted  Neurologic: Alert and oriented  No focal neurological deficits appreciated  Psychiatric: Normal mood and affect         Chalo Poplar, M D   Rheumatology

## 2021-03-09 ENCOUNTER — TELEPHONE (OUTPATIENT)
Dept: OBGYN CLINIC | Facility: HOSPITAL | Age: 66
End: 2021-03-09

## 2021-03-09 ENCOUNTER — LAB (OUTPATIENT)
Dept: LAB | Facility: CLINIC | Age: 66
End: 2021-03-09
Payer: COMMERCIAL

## 2021-03-09 ENCOUNTER — OFFICE VISIT (OUTPATIENT)
Dept: RHEUMATOLOGY | Facility: CLINIC | Age: 66
End: 2021-03-09
Payer: COMMERCIAL

## 2021-03-09 VITALS — DIASTOLIC BLOOD PRESSURE: 84 MMHG | HEART RATE: 94 BPM | SYSTOLIC BLOOD PRESSURE: 132 MMHG

## 2021-03-09 DIAGNOSIS — Z51.81 ENCOUNTER FOR MONITORING SULFASALAZINE THERAPY: ICD-10-CM

## 2021-03-09 DIAGNOSIS — Z79.899 ENCOUNTER FOR MONITORING SULFASALAZINE THERAPY: ICD-10-CM

## 2021-03-09 DIAGNOSIS — M47.819 PERIPHERAL SPONDYLOARTHRITIS: Primary | ICD-10-CM

## 2021-03-09 LAB
ALBUMIN SERPL BCP-MCNC: 3.8 G/DL (ref 3.5–5)
ALP SERPL-CCNC: 97 U/L (ref 46–116)
ALT SERPL W P-5'-P-CCNC: 36 U/L (ref 12–78)
ANION GAP SERPL CALCULATED.3IONS-SCNC: 6 MMOL/L (ref 4–13)
AST SERPL W P-5'-P-CCNC: 21 U/L (ref 5–45)
BASOPHILS # BLD AUTO: 0.09 THOUSANDS/ΜL (ref 0–0.1)
BASOPHILS NFR BLD AUTO: 1 % (ref 0–1)
BILIRUB SERPL-MCNC: 0.42 MG/DL (ref 0.2–1)
BUN SERPL-MCNC: 18 MG/DL (ref 5–25)
CALCIUM SERPL-MCNC: 8.5 MG/DL (ref 8.3–10.1)
CHLORIDE SERPL-SCNC: 103 MMOL/L (ref 100–108)
CO2 SERPL-SCNC: 29 MMOL/L (ref 21–32)
CREAT SERPL-MCNC: 1.5 MG/DL (ref 0.6–1.3)
CRP SERPL QL: 12.1 MG/L
EOSINOPHIL # BLD AUTO: 0.14 THOUSAND/ΜL (ref 0–0.61)
EOSINOPHIL NFR BLD AUTO: 1 % (ref 0–6)
ERYTHROCYTE [DISTWIDTH] IN BLOOD BY AUTOMATED COUNT: 13.5 % (ref 11.6–15.1)
ERYTHROCYTE [SEDIMENTATION RATE] IN BLOOD: 3 MM/HOUR (ref 0–19)
GFR SERPL CREATININE-BSD FRML MDRD: 49 ML/MIN/1.73SQ M
GLUCOSE SERPL-MCNC: 111 MG/DL (ref 65–140)
HCT VFR BLD AUTO: 46 % (ref 36.5–49.3)
HGB BLD-MCNC: 14.9 G/DL (ref 12–17)
IMM GRANULOCYTES # BLD AUTO: 0.03 THOUSAND/UL (ref 0–0.2)
IMM GRANULOCYTES NFR BLD AUTO: 0 % (ref 0–2)
LYMPHOCYTES # BLD AUTO: 3.1 THOUSANDS/ΜL (ref 0.6–4.47)
LYMPHOCYTES NFR BLD AUTO: 29 % (ref 14–44)
MCH RBC QN AUTO: 29.8 PG (ref 26.8–34.3)
MCHC RBC AUTO-ENTMCNC: 32.4 G/DL (ref 31.4–37.4)
MCV RBC AUTO: 92 FL (ref 82–98)
MONOCYTES # BLD AUTO: 1.14 THOUSAND/ΜL (ref 0.17–1.22)
MONOCYTES NFR BLD AUTO: 11 % (ref 4–12)
NEUTROPHILS # BLD AUTO: 6.27 THOUSANDS/ΜL (ref 1.85–7.62)
NEUTS SEG NFR BLD AUTO: 58 % (ref 43–75)
NRBC BLD AUTO-RTO: 0 /100 WBCS
PLATELET # BLD AUTO: 247 THOUSANDS/UL (ref 149–390)
PMV BLD AUTO: 10.9 FL (ref 8.9–12.7)
POTASSIUM SERPL-SCNC: 3.9 MMOL/L (ref 3.5–5.3)
PROT SERPL-MCNC: 7 G/DL (ref 6.4–8.2)
RBC # BLD AUTO: 5 MILLION/UL (ref 3.88–5.62)
SODIUM SERPL-SCNC: 138 MMOL/L (ref 136–145)
WBC # BLD AUTO: 10.77 THOUSAND/UL (ref 4.31–10.16)

## 2021-03-09 PROCEDURE — 36415 COLL VENOUS BLD VENIPUNCTURE: CPT

## 2021-03-09 PROCEDURE — 85025 COMPLETE CBC W/AUTO DIFF WBC: CPT

## 2021-03-09 PROCEDURE — 99214 OFFICE O/P EST MOD 30 MIN: CPT | Performed by: INTERNAL MEDICINE

## 2021-03-09 PROCEDURE — 86140 C-REACTIVE PROTEIN: CPT

## 2021-03-09 PROCEDURE — 85652 RBC SED RATE AUTOMATED: CPT

## 2021-03-09 PROCEDURE — 80053 COMPREHEN METABOLIC PANEL: CPT

## 2021-03-09 RX ORDER — METOPROLOL SUCCINATE 100 MG/1
TABLET, EXTENDED RELEASE ORAL
COMMUNITY
Start: 2021-01-16 | End: 2021-03-15 | Stop reason: CLARIF

## 2021-03-09 RX ORDER — KETOCONAZOLE 20 MG/G
CREAM TOPICAL
COMMUNITY
Start: 2020-12-02 | End: 2021-03-15 | Stop reason: CLARIF

## 2021-03-09 RX ORDER — CLOPIDOGREL BISULFATE 75 MG/1
TABLET ORAL
COMMUNITY
Start: 2021-02-19 | End: 2021-03-15 | Stop reason: CLARIF

## 2021-03-09 RX ORDER — DICLOFENAC SODIUM 75 MG/1
75 TABLET, DELAYED RELEASE ORAL 2 TIMES DAILY PRN
Qty: 60 TABLET | Refills: 0 | Status: SHIPPED | OUTPATIENT
Start: 2021-03-09 | End: 2021-03-10

## 2021-03-09 NOTE — TELEPHONE ENCOUNTER
Patient sees Dr Indra Sol called stating patient's prescription of diclofenac 75 mg could cause a reaction with his benazepril-hydrochlorthiazide  [facility] needs approval to give medication to patient      Call back # [phone]

## 2021-03-10 ENCOUNTER — TELEPHONE (OUTPATIENT)
Dept: OBGYN CLINIC | Facility: HOSPITAL | Age: 66
End: 2021-03-10

## 2021-03-10 DIAGNOSIS — M25.50 POLYARTHRALGIA: Primary | ICD-10-CM

## 2021-03-10 RX ORDER — GABAPENTIN 300 MG/1
300 CAPSULE ORAL
Qty: 30 CAPSULE | Refills: 0 | Status: SHIPPED | OUTPATIENT
Start: 2021-03-10 | End: 2021-04-15

## 2021-03-10 NOTE — TELEPHONE ENCOUNTER
Patient sees Dr Anita Hameed  Patient's wife Carmelia Iron called to inform that Debbiejerod Wilber had used before the medication diclofenac (VOLTAREN) 75 mg and did not work for him  He had that medication prescribed before from Dr Sudarshan Sosa and did not work and he is asking, if he can be prescribe with Gabapentin? Also, she is asking if the medications Plavix, Nizoral and Toprol-XL can be remove from [facility] since he does not take those medications? His PCP Dr Pastor Joel has been trying to remove those medications several times but still in the system       # [phone]

## 2021-03-10 NOTE — TELEPHONE ENCOUNTER
Please let him know not to start the diclofenac and we can try him on gabapentin, I will send this to his pharmacy  For the other medications to be removed this needs to be done with his PCP, thanks  I also wanted to touch base about his labs - this showed an elevated kidney function and I would like him to call his PCP office to look in to this further  The other labs were unremarkable

## 2021-03-15 ENCOUNTER — OFFICE VISIT (OUTPATIENT)
Dept: FAMILY MEDICINE CLINIC | Facility: CLINIC | Age: 66
End: 2021-03-15
Payer: COMMERCIAL

## 2021-03-15 VITALS
BODY MASS INDEX: 35.94 KG/M2 | TEMPERATURE: 98.1 F | DIASTOLIC BLOOD PRESSURE: 80 MMHG | HEIGHT: 67 IN | SYSTOLIC BLOOD PRESSURE: 134 MMHG | WEIGHT: 229 LBS

## 2021-03-15 DIAGNOSIS — I10 BENIGN ESSENTIAL HTN: Primary | ICD-10-CM

## 2021-03-15 DIAGNOSIS — R79.89 ELEVATED SERUM CREATININE: ICD-10-CM

## 2021-03-15 DIAGNOSIS — I10 ESSENTIAL HYPERTENSION: ICD-10-CM

## 2021-03-15 DIAGNOSIS — M19.90 INFLAMMATORY ARTHRITIS: ICD-10-CM

## 2021-03-15 PROBLEM — J01.00 ACUTE MAXILLARY SINUSITIS: Status: RESOLVED | Noted: 2019-11-22 | Resolved: 2021-03-15

## 2021-03-15 PROCEDURE — 3725F SCREEN DEPRESSION PERFORMED: CPT | Performed by: FAMILY MEDICINE

## 2021-03-15 PROCEDURE — 3008F BODY MASS INDEX DOCD: CPT | Performed by: FAMILY MEDICINE

## 2021-03-15 PROCEDURE — 99214 OFFICE O/P EST MOD 30 MIN: CPT | Performed by: FAMILY MEDICINE

## 2021-03-15 PROCEDURE — 3079F DIAST BP 80-89 MM HG: CPT | Performed by: FAMILY MEDICINE

## 2021-03-15 PROCEDURE — 1036F TOBACCO NON-USER: CPT | Performed by: FAMILY MEDICINE

## 2021-03-15 PROCEDURE — 3075F SYST BP GE 130 - 139MM HG: CPT | Performed by: FAMILY MEDICINE

## 2021-03-15 RX ORDER — HYDROCHLOROTHIAZIDE 25 MG/1
25 TABLET ORAL DAILY
Qty: 30 TABLET | Refills: 2 | Status: SHIPPED | OUTPATIENT
Start: 2021-03-15 | End: 2021-06-08

## 2021-03-15 NOTE — PROGRESS NOTES
Assessment/Plan:  Benign essential HTN    The patient's blood pressure control is acceptable today  His edema has resolved  He was started on benazepril / hydrochlorothiazide  Possibly the Ace is contributing to his renal dysfunction  We are going to try to him switch to hydrochlorothiazide alone  He is asked to monitor his home blood pressure also have a BMP  erformed in about 2-3 weeks  He agrees with this plan  Inflammatory arthritis    Continue with Azulfidine  Follow-up with Rheumatology  Continue to avoid all nonsteroidals    Edema   His edema has resolved  Will continue on hydrochlorothiazide  Elevated serum creatinine    We are going to have him discontinue ACE-inhibitor  Will continue with hydrochlorothiazide  He is also going to continue to avoid all nonsteroidals  Will repeat a BMP in about 3 weeks  He agrees with this plan  Diagnoses and all orders for this visit:    Benign essential HTN  -     hydrochlorothiazide (HYDRODIURIL) 25 mg tablet; Take 1 tablet (25 mg total) by mouth daily  -     Basic metabolic panel; Future    Elevated serum creatinine  -     Basic metabolic panel; Future    Essential hypertension    Inflammatory arthritis          Subjective:   Chief Complaint   Patient presents with    Discuss elevated kidney function  Patient ID: Michael Weaver  is a 60 y o  male  The rheumatologist that I needed to come see you for elevated kidney function  I only take the azulfidine once a day  Started on Lotensin / HCTZ by cardiology  Taking no NSAIDs  HPI    The patient is a 80-year-old male who presents today for follow-up of recent blood work  His rheumatologist noted that his creatinine had risen to 1 5 from a baseline in the 1 1 range  He has had no chest pain or shortness of breath  He had some edema which has improved  He was started on benazepril /hydrochlorothiazide by Cardiology this fall    He has also been taking   Sulfasalazine prescribed by Rheumatology  He has had no headache or diplopia  He has been taking no NSAIDs  He was given a prescription of gabapentin recently to take at bedtime but he has not been using it  The following portions of the patient's history were reviewed and updated as appropriate: allergies, current medications, past family history, past medical history, past social history, past surgical history and problem list     Review of Systems   Constitution: Negative  Cardiovascular: Negative  Respiratory: Negative  Endocrine: Negative  Hematologic/Lymphatic: Negative  Musculoskeletal: Positive for arthritis, joint pain, joint swelling and stiffness  Gastrointestinal: Positive for heartburn  Negative for constipation, diarrhea and dysphagia  Neurological: Negative  Psychiatric/Behavioral: Negative  Objective:    Physical Exam   Constitutional: He is oriented to person, place, and time  He appears well-developed and well-nourished  Overweight and in no distress   Neck: Neck supple  No JVD present  No thyromegaly present  Cardiovascular: Normal rate and regular rhythm  Pulmonary/Chest: Effort normal and breath sounds normal    Musculoskeletal:         General: No edema  Neurological: He is alert and oriented to person, place, and time  Psychiatric: He has a normal mood and affect  Judgment and thought content normal    Nursing note and vitals reviewed

## 2021-03-16 NOTE — ASSESSMENT & PLAN NOTE
We are going to have him discontinue ACE-inhibitor  Will continue with hydrochlorothiazide  He is also going to continue to avoid all nonsteroidals  Will repeat a BMP in about 3 weeks  He agrees with this plan

## 2021-03-16 NOTE — ASSESSMENT & PLAN NOTE
The patient's blood pressure control is acceptable today  His edema has resolved  He was started on benazepril / hydrochlorothiazide  Possibly the Ace is contributing to his renal dysfunction  We are going to try to him switch to hydrochlorothiazide alone  He is asked to monitor his home blood pressure also have a BMP  erformed in about 2-3 weeks  He agrees with this plan

## 2021-04-15 DIAGNOSIS — M25.50 POLYARTHRALGIA: ICD-10-CM

## 2021-04-15 RX ORDER — GABAPENTIN 300 MG/1
CAPSULE ORAL
Qty: 30 CAPSULE | Refills: 0 | Status: SHIPPED | OUTPATIENT
Start: 2021-04-15 | End: 2021-05-14 | Stop reason: SDUPTHER

## 2021-05-14 ENCOUNTER — TELEPHONE (OUTPATIENT)
Dept: OBGYN CLINIC | Facility: HOSPITAL | Age: 66
End: 2021-05-14

## 2021-05-14 DIAGNOSIS — R79.89 ELEVATED SERUM CREATININE: Primary | ICD-10-CM

## 2021-05-14 DIAGNOSIS — M25.50 POLYARTHRALGIA: ICD-10-CM

## 2021-05-14 RX ORDER — GABAPENTIN 300 MG/1
CAPSULE ORAL
Qty: 30 CAPSULE | Refills: 0 | Status: SHIPPED | OUTPATIENT
Start: 2021-05-14 | End: 2022-04-13 | Stop reason: ALTCHOICE

## 2021-05-14 NOTE — TELEPHONE ENCOUNTER
Patient sees Dr Charis Chandra is calling in from [facility] wanting to get the latest lab orders that is needed for this patient  The patient is stating that he always gets the same lab work done but she is showing stuff from last year  She is asking if this can be placed into epic as soon as possible since the patient is with them right now             Call back if needed# [phone]

## 2021-05-15 ENCOUNTER — APPOINTMENT (OUTPATIENT)
Dept: LAB | Facility: CLINIC | Age: 66
End: 2021-05-15
Payer: COMMERCIAL

## 2021-05-15 DIAGNOSIS — R79.89 ELEVATED SERUM CREATININE: ICD-10-CM

## 2021-05-15 LAB
ANION GAP SERPL CALCULATED.3IONS-SCNC: 9 MMOL/L (ref 4–13)
BUN SERPL-MCNC: 14 MG/DL (ref 5–25)
CALCIUM SERPL-MCNC: 8.1 MG/DL (ref 8.3–10.1)
CHLORIDE SERPL-SCNC: 106 MMOL/L (ref 100–108)
CO2 SERPL-SCNC: 28 MMOL/L (ref 21–32)
CREAT SERPL-MCNC: 1.22 MG/DL (ref 0.6–1.3)
GFR SERPL CREATININE-BSD FRML MDRD: 62 ML/MIN/1.73SQ M
GLUCOSE P FAST SERPL-MCNC: 109 MG/DL (ref 65–99)
POTASSIUM SERPL-SCNC: 4.5 MMOL/L (ref 3.5–5.3)
SODIUM SERPL-SCNC: 143 MMOL/L (ref 136–145)

## 2021-05-15 PROCEDURE — 36415 COLL VENOUS BLD VENIPUNCTURE: CPT

## 2021-05-15 PROCEDURE — 80048 BASIC METABOLIC PNL TOTAL CA: CPT

## 2021-05-29 DIAGNOSIS — K21.9 GASTROESOPHAGEAL REFLUX DISEASE: ICD-10-CM

## 2021-06-01 RX ORDER — OMEPRAZOLE 40 MG/1
CAPSULE, DELAYED RELEASE ORAL
Qty: 90 CAPSULE | Refills: 2 | Status: SHIPPED | OUTPATIENT
Start: 2021-06-01 | End: 2022-03-14

## 2021-06-08 DIAGNOSIS — I10 BENIGN ESSENTIAL HTN: ICD-10-CM

## 2021-06-08 RX ORDER — HYDROCHLOROTHIAZIDE 25 MG/1
TABLET ORAL
Qty: 90 TABLET | Refills: 0 | Status: SHIPPED | OUTPATIENT
Start: 2021-06-08 | End: 2021-09-01

## 2021-06-11 DIAGNOSIS — M19.90 INFLAMMATORY ARTHRITIS: ICD-10-CM

## 2021-06-11 RX ORDER — SULFASALAZINE 500 MG/1
TABLET ORAL
Qty: 180 TABLET | Refills: 1 | Status: SHIPPED | OUTPATIENT
Start: 2021-06-11 | End: 2021-12-15

## 2021-06-21 ENCOUNTER — TELEMEDICINE (OUTPATIENT)
Dept: FAMILY MEDICINE CLINIC | Facility: CLINIC | Age: 66
End: 2021-06-21
Payer: COMMERCIAL

## 2021-06-21 VITALS
BODY MASS INDEX: 34.53 KG/M2 | SYSTOLIC BLOOD PRESSURE: 119 MMHG | DIASTOLIC BLOOD PRESSURE: 77 MMHG | WEIGHT: 220 LBS | HEIGHT: 67 IN

## 2021-06-21 DIAGNOSIS — A69.20 LYME DISEASE: Primary | ICD-10-CM

## 2021-06-21 PROCEDURE — 3078F DIAST BP <80 MM HG: CPT | Performed by: FAMILY MEDICINE

## 2021-06-21 PROCEDURE — 99214 OFFICE O/P EST MOD 30 MIN: CPT | Performed by: FAMILY MEDICINE

## 2021-06-21 PROCEDURE — 3074F SYST BP LT 130 MM HG: CPT | Performed by: FAMILY MEDICINE

## 2021-06-21 PROCEDURE — 3008F BODY MASS INDEX DOCD: CPT | Performed by: FAMILY MEDICINE

## 2021-06-21 PROCEDURE — 1036F TOBACCO NON-USER: CPT | Performed by: FAMILY MEDICINE

## 2021-06-21 RX ORDER — DOXYCYCLINE HYCLATE 100 MG/1
100 CAPSULE ORAL EVERY 12 HOURS SCHEDULED
Qty: 30 CAPSULE | Refills: 0 | Status: SHIPPED | OUTPATIENT
Start: 2021-06-21 | End: 2021-07-06

## 2021-06-21 NOTE — ASSESSMENT & PLAN NOTE
Patient has had 5 days of headache  This is similar to a previous bout of Lyme several years ago  He did remove a deer tick nerve which was inflamed over 2 weeks ago  No ECM noted  No fever  No other associated symptoms  Based on his history we are going to treat him for primary Lyme with doxycycline 100 mg b i d  for 15 days  We are also going to send him for Lyme titer, C reactive protein and CBC with diff though he did discuss the limitations of checking a Lyme titer this early in the course, assuming that it was from recent tick bite  I do believe that based on his history and description that treating him for Lyme is appropriate    Also reviewed symptoms of Jarisch Herxheimer reaction

## 2021-06-21 NOTE — PROGRESS NOTES
Virtual Regular Visit      Assessment/Plan:    Problem List Items Addressed This Visit        Other    Lyme disease - Primary     Patient has had 5 days of headache  This is similar to a previous bout of Lyme several years ago  He did remove a deer tick nerve which was inflamed over 2 weeks ago  No ECM noted  No fever  No other associated symptoms  Based on his history we are going to treat him for primary Lyme with doxycycline 100 mg b i d  for 15 days  We are also going to send him for Lyme titer, C reactive protein and CBC with diff though he did discuss the limitations of checking a Lyme titer this early in the course, assuming that it was from recent tick bite  I do believe that based on his history and description that treating him for Lyme is appropriate  Also reviewed symptoms of Jarisch Herxheimer reaction         Relevant Medications    doxycycline hyclate (VIBRAMYCIN) 100 mg capsule    Other Relevant Orders    CBC and differential    Lyme Antibody Profile with reflex to WB    C-reactive protein          BMI Counseling: Body mass index is 34 46 kg/m²  The BMI is above normal  Nutrition recommendations include decreasing portion sizes, encouraging healthy choices of fruits and vegetables, consuming healthier snacks, limiting drinks that contain sugar and moderation in carbohydrate intake  Exercise recommendations include moderate physical activity 150 minutes/week and exercising 3-5 times per week  No pharmacotherapy was ordered  Reason for visit is   Chief Complaint   Patient presents with    Headache     x 2 days in sinus area, he did pull deer tick off of him 2-3 wks ago   Virtual Regular Visit        Encounter provider Cata Denton MD    Provider located at [address]  [address] [facility] [address]      Recent Visits  No visits were found meeting these conditions    Showing recent visits within past 7 days and meeting all other requirements  Today's Visits  Date Type Provider Dept   06/21/21 Telemedicine MD Iban Garcia   Showing today's visits and meeting all other requirements  Future Appointments  No visits were found meeting these conditions  Showing future appointments within next 150 days and meeting all other requirements       The patient was identified by name and date of birth  Cain Sherman Sr  was informed that this is a telemedicine visit and that the visit is being conducted through [address] [place] [address] Now and patient was informed that this is a secure, HIPAA-compliant platform  He agrees to proceed     My office door was closed  No one else was in the room  He acknowledged consent and understanding of privacy and security of the video platform  The patient has agreed to participate and understands they can discontinue the visit at any time  Patient is aware this is a billable service  Subjective  Christina Grant  is a 60 y o  male complaint over headache for the past 2 days  I dont normally get HAS but had 1 since Wed  Had a nymph bite 2 or more weeks  No ECM  No fever  No arthralgias, myalgias  Frontal HA  Nausea  No CVP c/o  HPI     The patient is a 52-year-old male with a history of Lyme about 10 years ago  About 2 years ago he had an acute arthritis which was initially felt to be related to Lyme  Lyme titer was positive though Western blot was not confirmatory at that time  He states that about 2 weeks or more ago he removed a deer tick nymph, the bite of which had become inflamed  He states about 5 days ago he developed a frontal headache which reminded him of when he had Lyme the last time  He states he does not normally get headaches  He has had some nausea but no vomiting  He has had no cardiovascular pulmonary complaint  He has been aware of no fever  He has had no arthralgias or myalgias that her out of the ordinary from his baseline  He has seen no rash/ECM      Past Medical History:   Diagnosis Date    Abnormal ECG     Last Assessed 2/29/2016    Acute maxillary sinusitis 11/22/2019    Acute non-recurrent maxillary sinusitis 11/22/2019    Acute viral syndrome 2/7/2020    Calf swelling 9/26/2019    CAP (community acquired pneumonia) 10/12/2016    Weight loss 2/11/2019       Past Surgical History:   Procedure Laterality Date    INGUINAL HERNIA REPAIR         Current Outpatient Medications   Medication Sig Dispense Refill    ergocalciferol (VITAMIN D2) 50,000 units Take 1 capsule (50,000 Units total) by mouth once a week 12 capsule 0    hydrochlorothiazide (HYDRODIURIL) 25 mg tablet TAKE 1 TABLET BY MOUTH EVERY DAY 90 tablet 0    omeprazole (PriLOSEC) 40 MG capsule TAKE 1 CAPSULE BY MOUTH EVERY DAY 90 capsule 2    sulfaSALAzine (AZULFIDINE) 500 mg tablet TAKE 1 TABLET BY MOUTH TWICE A  tablet 1    doxycycline hyclate (VIBRAMYCIN) 100 mg capsule Take 1 capsule (100 mg total) by mouth every 12 (twelve) hours for 15 days 30 capsule 0    gabapentin (NEURONTIN) 300 mg capsule TAKE 1 CAPSULE BY MOUTH AT BEDTIME (Patient not taking: Reported on 6/21/2021) 30 capsule 0     No current facility-administered medications for this visit  No Known Allergies    Review of Systems   Constitutional: Positive for activity change and fatigue  Negative for fever  Respiratory: Negative  Cardiovascular: Negative  Endocrine: Negative for polydipsia, polyphagia and polyuria  Musculoskeletal:        Chronic joint complaint   Skin: Negative for rash  Neurological: Positive for headaches  Negative for dizziness  Hematological: Negative for adenopathy  Does not bruise/bleed easily  Psychiatric/Behavioral: Negative  Video Exam    Vitals:    06/21/21 1655   BP: 119/77   Weight: 99 8 kg (220 lb)   Height: 5' 7" (1 702 m)       Physical Exam  Vitals and nursing note reviewed  Constitutional:       General: He is not in acute distress       Appearance: He is not ill-appearing  Comments: Appears fatigued but in no distress   Eyes:      Conjunctiva/sclera: Conjunctivae normal    Skin:     Findings: No erythema or rash  Comments: He inspects his skin and states that there is no rash resembling an ECM   Neurological:      Mental Status: He is alert and oriented to person, place, and time  Psychiatric:         Thought Content: Thought content normal          Judgment: Judgment normal       Comments: Mildly dysphoric affect which is chronic          I spent 20 which includes reviewing old records minutes directly with the patient during this visit      VIRTUAL VISIT [address] [place] [address]  acknowledges that he has consented to an online visit or consultation  He understands that the online visit is based solely on information provided by him, and that, in the absence of a face-to-face physical evaluation by the physician, the diagnosis he receives is both limited and provisional in terms of accuracy and completeness  This is not intended to replace a full medical face-to-face evaluation by the physician  Gail Corea understands and accepts these terms

## 2021-06-23 LAB
B BURGDOR AB SER IA-ACNC: <0.9 INDEX
BASOPHILS # BLD AUTO: 94 CELLS/UL (ref 0–200)
BASOPHILS NFR BLD AUTO: 1.1 %
CRP SERPL-MCNC: 10.9 MG/L
EOSINOPHIL # BLD AUTO: 204 CELLS/UL (ref 15–500)
EOSINOPHIL NFR BLD AUTO: 2.4 %
ERYTHROCYTE [DISTWIDTH] IN BLOOD BY AUTOMATED COUNT: 13 % (ref 11–15)
HCT VFR BLD AUTO: 46.5 % (ref 38.5–50)
HGB BLD-MCNC: 15.7 G/DL (ref 13.2–17.1)
LYMPHOCYTES # BLD AUTO: 3145 CELLS/UL (ref 850–3900)
LYMPHOCYTES NFR BLD AUTO: 37 %
MCH RBC QN AUTO: 30 PG (ref 27–33)
MCHC RBC AUTO-ENTMCNC: 33.8 G/DL (ref 32–36)
MCV RBC AUTO: 88.7 FL (ref 80–100)
MONOCYTES # BLD AUTO: 859 CELLS/UL (ref 200–950)
MONOCYTES NFR BLD AUTO: 10.1 %
NEUTROPHILS # BLD AUTO: 4199 CELLS/UL (ref 1500–7800)
NEUTROPHILS NFR BLD AUTO: 49.4 %
PLATELET # BLD AUTO: 240 THOUSAND/UL (ref 140–400)
PMV BLD REES-ECKER: 11.6 FL (ref 7.5–12.5)
RBC # BLD AUTO: 5.24 MILLION/UL (ref 4.2–5.8)
WBC # BLD AUTO: 8.5 THOUSAND/UL (ref 3.8–10.8)

## 2021-07-28 ENCOUNTER — OFFICE VISIT (OUTPATIENT)
Dept: URGENT CARE | Facility: CLINIC | Age: 66
End: 2021-07-28
Payer: COMMERCIAL

## 2021-07-28 VITALS
OXYGEN SATURATION: 97 % | BODY MASS INDEX: 34.53 KG/M2 | TEMPERATURE: 98.5 F | HEIGHT: 67 IN | WEIGHT: 220 LBS | HEART RATE: 93 BPM | RESPIRATION RATE: 20 BRPM

## 2021-07-28 DIAGNOSIS — Z11.59 SPECIAL SCREENING EXAMINATION FOR UNSPECIFIED VIRAL DISEASE: Primary | ICD-10-CM

## 2021-07-28 PROCEDURE — U0005 INFEC AGEN DETEC AMPLI PROBE: HCPCS | Performed by: PHYSICIAN ASSISTANT

## 2021-07-28 PROCEDURE — U0003 INFECTIOUS AGENT DETECTION BY NUCLEIC ACID (DNA OR RNA); SEVERE ACUTE RESPIRATORY SYNDROME CORONAVIRUS 2 (SARS-COV-2) (CORONAVIRUS DISEASE [COVID-19]), AMPLIFIED PROBE TECHNIQUE, MAKING USE OF HIGH THROUGHPUT TECHNOLOGIES AS DESCRIBED BY CMS-2020-01-R: HCPCS | Performed by: PHYSICIAN ASSISTANT

## 2021-07-28 PROCEDURE — 99213 OFFICE O/P EST LOW 20 MIN: CPT | Performed by: PHYSICIAN ASSISTANT

## 2021-07-28 NOTE — PROGRESS NOTES
[facility] Now        NAME: Brian Timmons  is a 60 y o  male  : 1955    MRN: [id number]  DATE: 2021  TIME: 9:43 AM    Assessment and Plan   Special screening examination for unspecified viral disease [Z11 59]  1  Special screening examination for unspecified viral disease  Novel Coronavirus (Covid-19),PCR [facility] - Office Collection         Patient Instructions     Swabbed for COVID-19, discussed self quarantine until contacted with results  Monitor for worsening symptoms  C/w OTC symptom relief including tylenol, fluids, rest  Recommend supplementing with vitamins D & C as well as a multivitamin    Follow up with PCP in 3-5 days  Proceed to  ER if symptoms worsen  Chief Complaint     Chief Complaint   Patient presents with    Cough     for a couple days    COVID-19     did not has shots  He wants to be tested         History of Present Illness       Patient presents for COVID-19 testing  Pt reports symptoms of cough, fatigue, and congestion for the past 6 days  Pt denies fever, chills, sweats, headache, sore throat, chest tightness, dyspnea, abdominal pain, nausea, vomiting, and diarrhea  Pt reports history of underlying autoimmune disease  Pt denies taking OTC symptom relief but reports symptom improvement in the past two days  Pt denies recent known COVID exposure but requests a COVID19 test so that he can return to work  He denies having received COVID19 vaccination  Review of Systems   Review of Systems   Constitutional: Positive for fatigue  Negative for chills and fever  HENT: Positive for congestion  Negative for ear pain and sore throat  Eyes: Negative for pain and visual disturbance  Respiratory: Positive for cough  Negative for shortness of breath  Cardiovascular: Negative for chest pain and palpitations  Gastrointestinal: Negative for abdominal pain and vomiting  Genitourinary: Negative for dysuria and hematuria     Musculoskeletal: Negative for arthralgias and back pain  Skin: Negative for color change and rash  Neurological: Negative for seizures and syncope  All other systems reviewed and are negative  Current Medications       Current Outpatient Medications:     ergocalciferol (VITAMIN D2) 50,000 units, Take 1 capsule (50,000 Units total) by mouth once a week, Disp: 12 capsule, Rfl: 0    hydrochlorothiazide (HYDRODIURIL) 25 mg tablet, TAKE 1 TABLET BY MOUTH EVERY DAY, Disp: 90 tablet, Rfl: 0    omeprazole (PriLOSEC) 40 MG capsule, TAKE 1 CAPSULE BY MOUTH EVERY DAY, Disp: 90 capsule, Rfl: 2    sulfaSALAzine (AZULFIDINE) 500 mg tablet, TAKE 1 TABLET BY MOUTH TWICE A DAY, Disp: 180 tablet, Rfl: 1    gabapentin (NEURONTIN) 300 mg capsule, TAKE 1 CAPSULE BY MOUTH AT BEDTIME (Patient not taking: Reported on 6/21/2021), Disp: 30 capsule, Rfl: 0    Current Allergies     Allergies as of 07/28/2021    (No Known Allergies)            The following portions of the patient's history were reviewed and updated as appropriate: allergies, current medications, past family history, past medical history, past social history, past surgical history and problem list      Past Medical History:   Diagnosis Date    Abnormal ECG     Last Assessed 2/29/2016    Acute maxillary sinusitis 11/22/2019    Acute non-recurrent maxillary sinusitis 11/22/2019    Acute viral syndrome 2/7/2020    Calf swelling 9/26/2019    CAP (community acquired pneumonia) 10/12/2016    Weight loss 2/11/2019       Past Surgical History:   Procedure Laterality Date    INGUINAL HERNIA REPAIR         Family History   Problem Relation Age of Onset    Cancer Family     Colon cancer Father     COPD Father          Medications have been verified  Objective   Pulse 93   Temp 98 5 °F (36 9 °C)   Resp 20   Ht 5' 7" (1 702 m)   Wt 99 8 kg (220 lb)   SpO2 97%   BMI 34 46 kg/m²   No LMP for male patient  Physical Exam     Physical Exam  Vitals and nursing note reviewed  Constitutional:       General: He is not in acute distress  Appearance: Normal appearance  He is well-developed  He is not ill-appearing or diaphoretic  HENT:      Head: Normocephalic and atraumatic  Right Ear: Tympanic membrane, ear canal and external ear normal       Left Ear: Tympanic membrane, ear canal and external ear normal       Nose: Congestion present  Mouth/Throat:      Mouth: Mucous membranes are moist       Pharynx: Oropharynx is clear  No oropharyngeal exudate or posterior oropharyngeal erythema  Eyes:      Conjunctiva/sclera: Conjunctivae normal       Pupils: Pupils are equal, round, and reactive to light  Cardiovascular:      Rate and Rhythm: Normal rate and regular rhythm  Heart sounds: Normal heart sounds  Pulmonary:      Effort: Pulmonary effort is normal  No respiratory distress  Breath sounds: Normal breath sounds  No stridor  No wheezing, rhonchi or rales  Musculoskeletal:      Cervical back: Normal range of motion and neck supple  Lymphadenopathy:      Cervical: No cervical adenopathy  Skin:     General: Skin is warm and dry  Capillary Refill: Capillary refill takes less than 2 seconds  Findings: No rash  Neurological:      Mental Status: He is alert and oriented to person, place, and time  Cranial Nerves: No cranial nerve deficit  Sensory: No sensory deficit  Psychiatric:         Behavior: Behavior normal          Thought Content:  Thought content normal

## 2021-07-29 LAB — SARS-COV-2 RNA RESP QL NAA+PROBE: POSITIVE

## 2021-08-11 ENCOUNTER — TELEMEDICINE (OUTPATIENT)
Dept: FAMILY MEDICINE CLINIC | Facility: CLINIC | Age: 66
End: 2021-08-11
Payer: COMMERCIAL

## 2021-08-11 VITALS — TEMPERATURE: 98.7 F

## 2021-08-11 DIAGNOSIS — U07.1 COVID-19: Primary | ICD-10-CM

## 2021-08-11 PROCEDURE — 99212 OFFICE O/P EST SF 10 MIN: CPT | Performed by: FAMILY MEDICINE

## 2021-08-11 NOTE — LETTER
August 11, 2021     Patient: Benjy Cee  YOB: 1955   Date of Visit: 8/11/2021       To Whom it May Concern:    Freddie Warner is under my professional care  He was seen in my office on 8/11/2021  He  May not return to work until cleared by this office  He will be re-evaluated the week of August 16th  If you have any questions or concerns, please don't hesitate to call           Sincerely,          Nereida Lorenzo MD        CC: No Recipients

## 2021-08-11 NOTE — ASSESSMENT & PLAN NOTE
The patient is a 41-year-old male with COVID-19 illness  He is on day 18 or 19 of his illness  He was improving but recently he noted return of his cough and diarrhea  I do not believe it is appropriate for him to return to work presently  We are going to have him remain home from work through next week when we will re-evaluate him  In the meantime he is asked push fluids, rest continue to monitor his oxygen saturation  If he sees that his saturation drops, especially less than 90 he is asked call for re-evaluation or seek more urgent medical attention  He agrees with this plan

## 2021-08-11 NOTE — PROGRESS NOTES
COVID-19 Outpatient Progress Note    Assessment/Plan:    Problem List Items Addressed This Visit        Other    COVID-19 - Primary       The patient is a 43-year-old male with COVID-19 illness  He is on day 18 or 19 of his illness  He was improving but recently he noted return of his cough and diarrhea  I do not believe it is appropriate for him to return to work presently  We are going to have him remain home from work through next week when we will re-evaluate him  In the meantime he is asked push fluids, rest continue to monitor his oxygen saturation  If he sees that his saturation drops, especially less than 90 he is asked call for re-evaluation or seek more urgent medical attention  He agrees with this plan  Disposition:     I have spent 15 minutes directly with the patient  Greater than 50% of this time was spent in counseling/coordination of care regarding: prognosis, instructions for management, patient and family education, importance of treatment compliance and impressions  Verification of patient location:    Patient is located in the following state in which I hold an active license [place]    Encounter provider Moises Terry MD    Provider located at [address] [address]    Recent Visits  No visits were found meeting these conditions  Showing recent visits within past 7 days and meeting all other requirements  Today's Visits  Date Type Provider Dept   08/11/21 Telemedicine Moises Terry MD  Louie    Showing today's visits and meeting all other requirements  Future Appointments  No visits were found meeting these conditions  Showing future appointments within next 150 days and meeting all other requirements     This virtual check-in was done via [employer] and patient was informed that this is a secure, HIPAA-compliant platform  He agrees to proceed      Patient agrees to participate in a virtual check in via telephone or video visit instead of presenting to the office to address urgent/immediate medical needs  Patient is aware this is a billable service  After connecting through [facility], the patient was identified by name and date of birth  Nacho Massey Sr  was informed that this was a telemedicine visit and that the exam was being conducted confidentially over secure lines  My office door was closed  No one else was in the room  Zaire Sheffield acknowledged consent and understanding of privacy and security of the telemedicine visit  I informed the patient that I have reviewed his record in Epic and presented the opportunity for him to ask any questions regarding the visit today  The patient agreed to participate  Subjective:   Zaire Sheffield is a 60 y o  male who is concerned about COVID-19  Patient's symptoms include chills, fatigue, nasal congestion, cough, diarrhea, myalgias and headache  Patient denies fever, anosmia, loss of taste, shortness of breath, chest tightness, nausea and vomiting  Date of symptom onset: 7/22/2021    Exposure:   Contact with a person who is under investigation (PUI) for or who is positive for COVID-19 within the last 14 days?: No    Hospitalized recently for fever and/or lower respiratory symptoms?: No      Currently a healthcare worker that is involved in direct patient care?: No      Works in a special setting where the risk of COVID-19 transmission may be high? (this may include long-term care, correctional and longterm facilities; homeless shelters; assisted-living facilities and group homes ): No      Resident in a special setting where the risk of COVID-19 transmission may be high? (this may include long-term care, correctional and longterm facilities; homeless shelters; assisted-living facilities and group homes ): No      I am not that bad, tired and worn out  Was feeling improved but cough and diarrhea returned over [address]  Isolation over    O2 sat     Lab Results   Component Value Date    SARSCOV2 Positive (A) 07/28/2021     Past Medical History:   Diagnosis Date    Abnormal ECG     Last Assessed 2/29/2016    Acute maxillary sinusitis 11/22/2019    Acute non-recurrent maxillary sinusitis 11/22/2019    Acute viral syndrome 2/7/2020    Calf swelling 9/26/2019    CAP (community acquired pneumonia) 10/12/2016    Weight loss 2/11/2019     Past Surgical History:   Procedure Laterality Date    INGUINAL HERNIA REPAIR       Current Outpatient Medications   Medication Sig Dispense Refill    ergocalciferol (VITAMIN D2) 50,000 units Take 1 capsule (50,000 Units total) by mouth once a week 12 capsule 0    hydrochlorothiazide (HYDRODIURIL) 25 mg tablet TAKE 1 TABLET BY MOUTH EVERY DAY 90 tablet 0    omeprazole (PriLOSEC) 40 MG capsule TAKE 1 CAPSULE BY MOUTH EVERY DAY 90 capsule 2    sulfaSALAzine (AZULFIDINE) 500 mg tablet TAKE 1 TABLET BY MOUTH TWICE A DAY (Patient taking differently: daily ) 180 tablet 1    gabapentin (NEURONTIN) 300 mg capsule TAKE 1 CAPSULE BY MOUTH AT BEDTIME (Patient not taking: Reported on 6/21/2021) 30 capsule 0     No current facility-administered medications for this visit  No Known Allergies    Review of Systems   Constitutional: Positive for chills and fatigue  Negative for fever  HENT: Positive for congestion  Respiratory: Positive for cough  Negative for chest tightness and shortness of breath  Gastrointestinal: Positive for diarrhea  Negative for nausea and vomiting  Musculoskeletal: Positive for myalgias  Neurological: Positive for headaches  Objective:    Vitals:    08/11/21 1111   Temp: 98 7 °F (37 1 °C)       Physical Exam  Constitutional:       General: He is not in acute distress  Appearance: He is ill-appearing  HENT:      Nose: Congestion present  Eyes:      General:         Right eye: No discharge  Left eye: No discharge     Pulmonary:      Effort: Pulmonary effort is normal  No respiratory distress  Comments: Frequent tight sounding cough during exam  O2 sat 97  Neurological:      Mental Status: He is alert and oriented to person, place, and time  Psychiatric:         Thought Content: Thought content normal          Judgment: Judgment normal          VIRTUAL VISIT DISCLAIMER    George Ramirez Sr  verbally agrees to participate in [employer]  Pt is aware that [employer] could be limited without vital signs or the ability to perform a full hands-on physical Lorenzo Low Sr  understands he or the provider may request at any time to terminate the video visit and request the patient to seek care or treatment in person

## 2021-08-16 ENCOUNTER — TELEMEDICINE (OUTPATIENT)
Dept: FAMILY MEDICINE CLINIC | Facility: CLINIC | Age: 66
End: 2021-08-16
Payer: COMMERCIAL

## 2021-08-16 VITALS — TEMPERATURE: 96.3 F | HEIGHT: 67 IN | WEIGHT: 220 LBS | BODY MASS INDEX: 34.53 KG/M2

## 2021-08-16 DIAGNOSIS — U07.1 COVID-19: Primary | ICD-10-CM

## 2021-08-16 PROCEDURE — 3008F BODY MASS INDEX DOCD: CPT | Performed by: FAMILY MEDICINE

## 2021-08-16 PROCEDURE — 99213 OFFICE O/P EST LOW 20 MIN: CPT | Performed by: FAMILY MEDICINE

## 2021-08-16 NOTE — ASSESSMENT & PLAN NOTE
The patient is a 28-year-old male with slowly improving COVID-19 illness  He has persistent fatigue which is significant  He is also mostly distressed as his wife is currently on a ventilator with a poor prognosis due to COVID-19  We are going to have him continue to push fluids and rest   We are going to excuse him from work until August 31st and re-evaluate him in the day or 2 prior to this  In the meantime will call or seek more urgent medical attention should his condition deteriorate  He agrees with this plan

## 2021-08-16 NOTE — PROGRESS NOTES
COVID-19 Outpatient Progress Note    Assessment/Plan:    Problem List Items Addressed This Visit        Other    COVID-19 - Primary       The patient is a 52-year-old male with slowly improving COVID-19 illness  He has persistent fatigue which is significant  He is also mostly distressed as his wife is currently on a ventilator with a poor prognosis due to COVID-19  We are going to have him continue to push fluids and rest   We are going to excuse him from work until August 31st and re-evaluate him in the day or 2 prior to this  In the meantime will call or seek more urgent medical attention should his condition deteriorate  He agrees with this plan  Disposition:     I have spent 15 minutes directly with the patient  Greater than 50% of this time was spent in counseling/coordination of care regarding: prognosis, instructions for management, patient and family education and impressions  Verification of patient location:    Patient is located in the following state in which I hold an active license [place]    Encounter provider Ferdinand Acosta MD    Provider located at [address] [address]    Recent Visits  Date Type Provider Dept   08/11/21 Telemedicine Ferdinand Acosta MD [id number] recent visits within past 7 days and meeting all other requirements  Today's Visits  Date Type Provider Dept   08/16/21 Telemedicine MD Iban Paige   Showing today's visits and meeting all other requirements  Future Appointments  No visits were found meeting these conditions  Showing future appointments within next 150 days and meeting all other requirements     This virtual check-in was done via [employer] and patient was informed that this is a secure, HIPAA-compliant platform  He agrees to proceed      Patient agrees to participate in a virtual check in via telephone or video visit instead of presenting to the office to address urgent/immediate medical needs  Patient is aware this is a billable service  After connecting through [facility], the patient was identified by name and date of birth  Justice Cassidy Sr  was informed that this was a telemedicine visit and that the exam was being conducted confidentially over secure lines  Herber Mojica acknowledged consent and understanding of privacy and security of the telemedicine visit  I informed the patient that I have reviewed his record in Epic and presented the opportunity for him to ask any questions regarding the visit today  The patient agreed to participate  Subjective:   Herber Mojica is a 60 y o  male who is concerned about COVID-19  Patient's symptoms include fatigue and chest tightness  Patient denies fever, rhinorrhea, anosmia, loss of taste, shortness of breath, nausea, vomiting, diarrhea, myalgias and headaches  Date of symptom onset: 7/22/2021  COVID-19 vaccination status: Fully vaccinated    Exposure:   Contact with a person who is under investigation (PUI) for or who is positive for COVID-19 within the last 14 days?: Yes    Hospitalized recently for fever and/or lower respiratory symptoms?: No      Currently a healthcare worker that is involved in direct patient care?: No      Works in a special setting where the risk of COVID-19 transmission may be high? (this may include long-term care, correctional and prison facilities; homeless shelters; assisted-living facilities and group homes ): No      Resident in a special setting where the risk of COVID-19 transmission may be high? (this may include long-term care, correctional and prison facilities; homeless shelters; assisted-living facilities and group homes ): No      I am not feeling better  Congested, T 97, ? O2 sats, lost po  Cough improving, some CP, no SOB   Appetite OK,     Lab Results   Component Value Date    SARSCOV2 Positive (A) 07/28/2021     Past Medical History:   Diagnosis Date    Abnormal ECG     Last Assessed 2/29/2016    Acute maxillary sinusitis 11/22/2019    Acute non-recurrent maxillary sinusitis 11/22/2019    Acute viral syndrome 2/7/2020    Calf swelling 9/26/2019    CAP (community acquired pneumonia) 10/12/2016    Weight loss 2/11/2019     Past Surgical History:   Procedure Laterality Date    INGUINAL HERNIA REPAIR       Current Outpatient Medications   Medication Sig Dispense Refill    ergocalciferol (VITAMIN D2) 50,000 units Take 1 capsule (50,000 Units total) by mouth once a week 12 capsule 0    hydrochlorothiazide (HYDRODIURIL) 25 mg tablet TAKE 1 TABLET BY MOUTH EVERY DAY 90 tablet 0    omeprazole (PriLOSEC) 40 MG capsule TAKE 1 CAPSULE BY MOUTH EVERY DAY 90 capsule 2    sulfaSALAzine (AZULFIDINE) 500 mg tablet TAKE 1 TABLET BY MOUTH TWICE A DAY (Patient taking differently: daily ) 180 tablet 1    gabapentin (NEURONTIN) 300 mg capsule TAKE 1 CAPSULE BY MOUTH AT BEDTIME (Patient not taking: Reported on 6/21/2021) 30 capsule 0     No current facility-administered medications for this visit  No Known Allergies    Review of Systems   Constitutional: Positive for fatigue  Negative for fever  HENT: Negative for rhinorrhea  Respiratory: Positive for chest tightness  Negative for shortness of breath  Gastrointestinal: Negative for diarrhea, nausea and vomiting  Musculoskeletal: Negative for myalgias  Neurological: Negative for headaches  Objective:    Vitals:    08/16/21 1457   Temp: (!) 96 3 °F (35 7 °C)   Weight: 99 8 kg (220 lb)   Height: 5' 7" (1 702 m)       Physical Exam  Vitals and nursing note reviewed  Constitutional:       General: He is not in acute distress  Appearance: He is ill-appearing  Cardiovascular:      Rate and Rhythm: Normal rate and regular rhythm  Pulmonary:      Effort: Pulmonary effort is normal       Comments: Occasional cough noted during examination    No acute distress  Neurological:      Mental Status: He is alert and oriented to person, place, and time  Psychiatric:         Thought Content: Thought content normal          Judgment: Judgment normal       Comments: Tearful speak about wife who is critically ill with COVID on a ventilator         VIRTUAL VISIT 100 [facility]  verbally agrees to participate in [employer]  Pt is aware that [employer] could be limited without vital signs or the ability to perform a full hands-on physical Vahid Sanchez Sr  understands he or the provider may request at any time to terminate the video visit and request the patient to seek care or treatment in person

## 2021-08-16 NOTE — LETTER
August 16, 2021     Patient: Yumiko Case  YOB: 1955   Date of Visit: 8/16/2021       To Whom it May Concern:    Glorya Kussmaul is under my professional care  He was seen in my office on 8/16/2021  He may return to work on 8/31/21  If you have any questions or concerns, please don't hesitate to call           Sincerely,          Teena Vasquez MD        CC: No Recipients

## 2021-08-30 ENCOUNTER — TELEMEDICINE (OUTPATIENT)
Dept: FAMILY MEDICINE CLINIC | Facility: CLINIC | Age: 66
End: 2021-08-30
Payer: COMMERCIAL

## 2021-08-30 DIAGNOSIS — U07.1 COVID-19: Primary | ICD-10-CM

## 2021-08-30 PROBLEM — A69.20 LYME DISEASE: Status: RESOLVED | Noted: 2021-06-21 | Resolved: 2021-08-30

## 2021-08-30 PROCEDURE — 99212 OFFICE O/P EST SF 10 MIN: CPT | Performed by: FAMILY MEDICINE

## 2021-08-30 PROCEDURE — 1036F TOBACCO NON-USER: CPT | Performed by: FAMILY MEDICINE

## 2021-08-30 NOTE — ASSESSMENT & PLAN NOTE
His symptoms are slowly improving  Continues to have some degree of fatigue and minimal cough  He is also emotionally distressed by the death of his wife several days ago  We decided that he will remain out the rest this weekend a return to work on Monday  If he does not feel that he is fit for duty on Monday he is going to contact us  He agrees with this plan

## 2021-08-30 NOTE — PROGRESS NOTES
COVID-19 Outpatient Progress Note    Assessment/Plan:    Problem List Items Addressed This Visit        Other    [id number] - Primary       His symptoms are slowly improving  Continues to have some degree of fatigue and minimal cough  He is also emotionally distressed by the death of his wife several days ago  We decided that he will remain out the rest this weekend a return to work on Monday  If he does not feel that he is fit for duty on Monday he is going to contact us  He agrees with this plan  Disposition:     I have spent 15 minutes directly with the patient  Greater than 50% of this time was spent in counseling/coordination of care regarding: instructions for management and impressions  Verification of patient location:    Patient is located in the following state in which I hold an active license [place]    Encounter provider Laury Weldon MD    Provider located at [address] [address]    Recent Visits  No visits were found meeting these conditions  Showing recent visits within past 7 days and meeting all other requirements  Today's Visits  Date Type Provider Dept   08/30/21 Telemedicine Laury Weldon MD [facility]   Showing today's visits and meeting all other requirements  Future Appointments  No visits were found meeting these conditions  Showing future appointments within next 150 days and meeting all other requirements     This virtual check-in was done via [employer] and patient was informed that this is a secure, HIPAA-compliant platform  He agrees to proceed  Patient agrees to participate in a virtual check in via telephone or video visit instead of presenting to the office to address urgent/immediate medical needs  Patient is aware this is a billable service  After connecting through [facility], the patient was identified by name and date of birth   Idris Canales Sr  was informed that this was a telemedicine visit and that the exam was being conducted confidentially over secure lines  [place] acknowledged consent and understanding of privacy and security of the telemedicine visit  I informed the patient that I have reviewed his record in Epic and presented the opportunity for him to ask any questions regarding the visit today  The patient agreed to participate  Subjective:   eDjuan Rooney is a 60 y o  male who is concerned about COVID-19  Patient's symptoms include fatigue, nasal congestion, cough and shortness of breath  Patient denies fever and chest tightness  Date of symptom onset: 2021  COVID-19 vaccination status: Fully vaccinated    Wife  from Covid recently, I am still congested  Remains somewhat fatigued, No chest tightness but mild SOB with walking up hill  No fever  No anosmia or dysgeusia  No N/V/D  Appetite improving       Lab Results   Component Value Date    SARSCOV2 Positive (A) 2021     Past Medical History:   Diagnosis Date    Abnormal ECG     Last Assessed 2016    Acute maxillary sinusitis 2019    Acute non-recurrent maxillary sinusitis 2019    Acute viral syndrome 2020    Calf swelling 2019    CAP (community acquired pneumonia) 10/12/2016    Lyme disease 2021    Weight loss 2019     Past Surgical History:   Procedure Laterality Date    INGUINAL HERNIA REPAIR       Current Outpatient Medications   Medication Sig Dispense Refill    ergocalciferol (VITAMIN D2) 50,000 units Take 1 capsule (50,000 Units total) by mouth once a week 12 capsule 0    hydrochlorothiazide (HYDRODIURIL) 25 mg tablet TAKE 1 TABLET BY MOUTH EVERY DAY 90 tablet 0    omeprazole (PriLOSEC) 40 MG capsule TAKE 1 CAPSULE BY MOUTH EVERY DAY 90 capsule 2    sulfaSALAzine (AZULFIDINE) 500 mg tablet TAKE 1 TABLET BY MOUTH TWICE A DAY (Patient taking differently: daily ) 180 tablet 1    gabapentin (NEURONTIN) 300 mg capsule TAKE 1 CAPSULE BY MOUTH AT BEDTIME (Patient not taking: Reported on 6/21/2021) 30 capsule 0     No current facility-administered medications for this visit  No Known Allergies    Review of Systems   Constitutional: Positive for activity change and fatigue  Negative for fever  HENT: Positive for congestion  Respiratory: Positive for cough and shortness of breath  Negative for chest tightness and wheezing  Genitourinary: Negative  Neurological: Negative  Psychiatric/Behavioral: Positive for sleep disturbance  Objective: There were no vitals filed for this visit  Physical Exam  Pulmonary:      Effort: Pulmonary effort is normal  No respiratory distress  Comments: Occasional cough noted throughout virtual visit  Neurological:      Mental Status: He is alert and oriented to person, place, and time  Psychiatric:      Comments: Appearing dysphoric and becoming tearful during the virtual visit         VIRTUAL VISIT 100 [facility]  verbally agrees to participate in [employer]  Pt is aware that [employer] could be limited without vital signs or the ability to perform a full hands-on physical Eugene Mariluz Sr  understands he or the provider may request at any time to terminate the video visit and request the patient to seek care or treatment in person

## 2021-09-01 DIAGNOSIS — I10 BENIGN ESSENTIAL HTN: ICD-10-CM

## 2021-09-01 RX ORDER — HYDROCHLOROTHIAZIDE 25 MG/1
TABLET ORAL
Qty: 90 TABLET | Refills: 0 | Status: SHIPPED | OUTPATIENT
Start: 2021-09-01 | End: 2022-04-13 | Stop reason: ALTCHOICE

## 2021-09-09 ENCOUNTER — TELEPHONE (OUTPATIENT)
Dept: RHEUMATOLOGY | Facility: CLINIC | Age: 66
End: 2021-09-09

## 2021-09-09 DIAGNOSIS — Z51.81 ENCOUNTER FOR MONITORING SULFASALAZINE THERAPY: Primary | ICD-10-CM

## 2021-09-09 DIAGNOSIS — Z79.899 ENCOUNTER FOR MONITORING SULFASALAZINE THERAPY: Primary | ICD-10-CM

## 2021-09-29 ENCOUNTER — OFFICE VISIT (OUTPATIENT)
Dept: FAMILY MEDICINE CLINIC | Facility: CLINIC | Age: 66
End: 2021-09-29
Payer: COMMERCIAL

## 2021-09-29 VITALS
WEIGHT: 223 LBS | DIASTOLIC BLOOD PRESSURE: 88 MMHG | HEIGHT: 67 IN | SYSTOLIC BLOOD PRESSURE: 138 MMHG | BODY MASS INDEX: 35 KG/M2

## 2021-09-29 DIAGNOSIS — J30.1 SEASONAL ALLERGIC RHINITIS DUE TO POLLEN: ICD-10-CM

## 2021-09-29 DIAGNOSIS — F43.22 ACUTE ADJUSTMENT DISORDER WITH ANXIETY: Primary | ICD-10-CM

## 2021-09-29 PROCEDURE — 3008F BODY MASS INDEX DOCD: CPT | Performed by: FAMILY MEDICINE

## 2021-09-29 PROCEDURE — 1036F TOBACCO NON-USER: CPT | Performed by: FAMILY MEDICINE

## 2021-09-29 PROCEDURE — 99214 OFFICE O/P EST MOD 30 MIN: CPT | Performed by: FAMILY MEDICINE

## 2021-09-29 RX ORDER — LORATADINE 10 MG/1
10 TABLET ORAL DAILY
Qty: 30 TABLET | Refills: 5 | Status: SHIPPED | OUTPATIENT
Start: 2021-09-29 | End: 2022-04-13 | Stop reason: ALTCHOICE

## 2021-09-29 RX ORDER — ALPRAZOLAM 0.25 MG/1
0.25 TABLET ORAL
Qty: 15 TABLET | Refills: 0 | Status: SHIPPED | OUTPATIENT
Start: 2021-09-29 | End: 2022-04-13 | Stop reason: ALTCHOICE

## 2021-09-29 NOTE — ASSESSMENT & PLAN NOTE
The patient is suffering from an acute adjustment disorder with related anxiety after the recent death of his wife from complications of [id number]  He is distraught and tearful today  He has insomnia, fatigue, lack of interest as well as inability to concentrate  We are going to give him some alprazolam to be used sparingly in the evening to try to allow him to sleep  We gave him warnings in regard to sedation, ataxia X cetera  He certainly can not drive while taking this medication  Additionally he cannot operate machinery and based on this will be disabled from work pending improvement in his condition  As he is scheduled to retire in less than 5 weeks, I believe he will not be able to return to work prior to his [facility] date  Paperwork was filled out to this end today  See him back in about 1 month and will call sooner as needed  He agrees with this plan

## 2021-09-29 NOTE — PROGRESS NOTES
Assessment/Plan:  Acute adjustment disorder with anxiety  The patient is suffering from an acute adjustment disorder with related anxiety after the recent death of his wife from complications of [id number]  He is distraught and tearful today  He has insomnia, fatigue, lack of interest as well as inability to concentrate  We are going to give him some alprazolam to be used sparingly in the evening to try to allow him to sleep  We gave him warnings in regard to sedation, ataxia X cetera  He certainly can not drive while taking this medication  Additionally he cannot operate machinery and based on this will be disabled from work pending improvement in his condition  As he is scheduled to retire in less than 5 weeks, I believe he will not be able to return to work prior to his nursing home date  Paperwork was filled out to this end today  See him back in about 1 month and will call sooner as needed  He agrees with this plan  Diagnoses and all orders for this visit:    Acute adjustment disorder with anxiety  -     ALPRAZolam (XANAX) 0 25 mg tablet; Take 1 tablet (0 25 mg total) by mouth daily at bedtime as needed for anxiety    Seasonal allergic rhinitis due to pollen  -     loratadine (CLARITIN) 10 mg tablet; Take 1 tablet (10 mg total) by mouth daily          Subjective:   Chief Complaint   Patient presents with   Viry Shelton discuss disability due to loss of wife  Retires in 1 month  Patient ID: Taylor Rodriguez  is a 60 y o  male  I was going to go back to work but work told me that they thought I shouldn't    Distraught, sleep poor, interest   poor, energy poor, poor concentration, appetite increased and weight increased, no suicidal ideation  HPI  The patient is a 67-year-old male who presents today with symptoms of anxiety  His wife recently passed away due to complications from [id number]-[id number]  He had announced his intent to retire from his job at the end of October    This occurred prior to he and his wife developing COVID-19  Since her death he has been distraught  He cannot sleep, his interest and energy level or poor as well as his ability to concentrate  His appetite been increased as well as weight  He has had no suicidal ideation  He works as a   The following portions of the patient's history were reviewed and updated as appropriate: allergies, current medications, past family history, past medical history, past social history, past surgical history and problem list     Review of Systems   Constitutional: Positive for malaise/fatigue  Negative for decreased appetite, weight gain and weight loss  Cardiovascular: Negative for chest pain, irregular heartbeat and leg swelling  Respiratory: Negative for cough, shortness of breath and wheezing  Musculoskeletal: Positive for stiffness  Gastrointestinal: Negative  Genitourinary: Negative  Psychiatric/Behavioral: Positive for depression  Negative for altered mental status, substance abuse and suicidal ideas  The patient has insomnia and is nervous/anxious  Objective:    Physical Exam  Vitals and nursing note reviewed  Neck:      Comments: No JVD  Cardiovascular:      Rate and Rhythm: Normal rate and regular rhythm  Pulmonary:      Effort: Pulmonary effort is normal       Breath sounds: Normal breath sounds  Musculoskeletal:      Right lower leg: No edema  Left lower leg: No edema  Neurological:      Mental Status: He is oriented to person, place, and time  Psychiatric:         Behavior: Behavior normal          Thought Content: Thought content normal          Judgment: Judgment normal       Comments: He has a depressed affect  He becomes tearful during the course of the visit discussing his  wife

## 2021-12-15 DIAGNOSIS — M19.90 INFLAMMATORY ARTHRITIS: ICD-10-CM

## 2021-12-15 RX ORDER — SULFASALAZINE 500 MG/1
TABLET ORAL
Qty: 180 TABLET | Refills: 1 | Status: SHIPPED | OUTPATIENT
Start: 2021-12-15 | End: 2022-06-10 | Stop reason: SDUPTHER

## 2022-03-28 ENCOUNTER — HOSPITAL ENCOUNTER (EMERGENCY)
Facility: HOSPITAL | Age: 67
Discharge: HOME | End: 2022-03-28
Attending: EMERGENCY MEDICINE
Payer: MEDICARE

## 2022-03-28 VITALS
HEART RATE: 100 BPM | RESPIRATION RATE: 20 BRPM | OXYGEN SATURATION: 97 % | TEMPERATURE: 98.2 F | SYSTOLIC BLOOD PRESSURE: 188 MMHG | DIASTOLIC BLOOD PRESSURE: 87 MMHG

## 2022-03-28 DIAGNOSIS — F10.239 ALCOHOL WITHDRAWAL (HCC): Primary | ICD-10-CM

## 2022-03-28 LAB
ALBUMIN SERPL BCP-MCNC: 4.4 G/DL (ref 3.5–5)
ALP SERPL-CCNC: 94 U/L (ref 46–116)
ALT SERPL W P-5'-P-CCNC: 77 U/L (ref 12–78)
ANION GAP SERPL CALCULATED.3IONS-SCNC: 8 MMOL/L (ref 4–13)
AST SERPL W P-5'-P-CCNC: 59 U/L (ref 5–45)
BASOPHILS # BLD AUTO: 0.12 THOUSANDS/ΜL (ref 0–0.1)
BASOPHILS NFR BLD AUTO: 1 % (ref 0–1)
BILIRUB SERPL-MCNC: 0.55 MG/DL (ref 0.2–1)
BUN SERPL-MCNC: 18 MG/DL (ref 5–25)
CALCIUM SERPL-MCNC: 9 MG/DL (ref 8.3–10.1)
CHLORIDE SERPL-SCNC: 99 MMOL/L (ref 100–108)
CO2 SERPL-SCNC: 24 MMOL/L (ref 21–32)
CREAT SERPL-MCNC: 1.17 MG/DL (ref 0.6–1.3)
EOSINOPHIL # BLD AUTO: 0.06 THOUSAND/ΜL (ref 0–0.61)
EOSINOPHIL NFR BLD AUTO: 1 % (ref 0–6)
ERYTHROCYTE [DISTWIDTH] IN BLOOD BY AUTOMATED COUNT: 13.6 % (ref 11.6–15.1)
GFR SERPL CREATININE-BSD FRML MDRD: 65 ML/MIN/1.73SQ M
GLUCOSE SERPL-MCNC: 106 MG/DL (ref 65–140)
GLUCOSE SERPL-MCNC: 118 MG/DL (ref 65–140)
HCT VFR BLD AUTO: 50.7 % (ref 36.5–49.3)
HGB BLD-MCNC: 16.8 G/DL (ref 12–17)
IMM GRANULOCYTES # BLD AUTO: 0.05 THOUSAND/UL (ref 0–0.2)
IMM GRANULOCYTES NFR BLD AUTO: 0 % (ref 0–2)
LYMPHOCYTES # BLD AUTO: 2.64 THOUSANDS/ΜL (ref 0.6–4.47)
LYMPHOCYTES NFR BLD AUTO: 21 % (ref 14–44)
MAGNESIUM SERPL-MCNC: 1.7 MG/DL (ref 1.6–2.6)
MCH RBC QN AUTO: 30.7 PG (ref 26.8–34.3)
MCHC RBC AUTO-ENTMCNC: 33.1 G/DL (ref 31.4–37.4)
MCV RBC AUTO: 93 FL (ref 82–98)
MONOCYTES # BLD AUTO: 1.23 THOUSAND/ΜL (ref 0.17–1.22)
MONOCYTES NFR BLD AUTO: 10 % (ref 4–12)
NEUTROPHILS # BLD AUTO: 8.53 THOUSANDS/ΜL (ref 1.85–7.62)
NEUTS SEG NFR BLD AUTO: 67 % (ref 43–75)
NRBC BLD AUTO-RTO: 0 /100 WBCS
PLATELET # BLD AUTO: 268 THOUSANDS/UL (ref 149–390)
PMV BLD AUTO: 11.2 FL (ref 8.9–12.7)
POTASSIUM SERPL-SCNC: 4.4 MMOL/L (ref 3.5–5.3)
PROT SERPL-MCNC: 8.3 G/DL (ref 6.4–8.2)
RBC # BLD AUTO: 5.48 MILLION/UL (ref 3.88–5.62)
SODIUM SERPL-SCNC: 131 MMOL/L (ref 136–145)
TSH SERPL DL<=0.05 MIU/L-ACNC: 1.41 UIU/ML (ref 0.36–3.74)
WBC # BLD AUTO: 12.63 THOUSAND/UL (ref 4.31–10.16)

## 2022-03-28 PROCEDURE — 99291 CRITICAL CARE FIRST HOUR: CPT | Performed by: EMERGENCY MEDICINE

## 2022-03-28 PROCEDURE — 96375 TX/PRO/DX INJ NEW DRUG ADDON: CPT

## 2022-03-28 PROCEDURE — 82948 REAGENT STRIP/BLOOD GLUCOSE: CPT

## 2022-03-28 PROCEDURE — 36415 COLL VENOUS BLD VENIPUNCTURE: CPT

## 2022-03-28 PROCEDURE — 96374 THER/PROPH/DIAG INJ IV PUSH: CPT

## 2022-03-28 PROCEDURE — 93005 ELECTROCARDIOGRAM TRACING: CPT

## 2022-03-28 PROCEDURE — 96361 HYDRATE IV INFUSION ADD-ON: CPT

## 2022-03-28 PROCEDURE — 80053 COMPREHEN METABOLIC PANEL: CPT

## 2022-03-28 PROCEDURE — 84443 ASSAY THYROID STIM HORMONE: CPT

## 2022-03-28 PROCEDURE — 83735 ASSAY OF MAGNESIUM: CPT

## 2022-03-28 PROCEDURE — 85025 COMPLETE CBC W/AUTO DIFF WBC: CPT

## 2022-03-28 PROCEDURE — 99285 EMERGENCY DEPT VISIT HI MDM: CPT

## 2022-03-28 RX ORDER — PHENOBARBITAL SODIUM 65 MG/ML
65 INJECTION INTRAMUSCULAR ONCE
Status: COMPLETED | OUTPATIENT
Start: 2022-03-28 | End: 2022-03-28

## 2022-03-28 RX ORDER — LORAZEPAM 2 MG/ML
1 INJECTION INTRAMUSCULAR ONCE
Status: COMPLETED | OUTPATIENT
Start: 2022-03-28 | End: 2022-03-28

## 2022-03-28 RX ADMIN — SODIUM CHLORIDE 1000 ML: 0.9 INJECTION, SOLUTION INTRAVENOUS at 18:29

## 2022-03-28 RX ADMIN — LORAZEPAM 1 MG: 2 INJECTION INTRAMUSCULAR; INTRAVENOUS at 18:29

## 2022-03-28 RX ADMIN — PHENOBARBITAL SODIUM 65 MG: 65 INJECTION INTRAMUSCULAR; INTRAVENOUS at 18:50

## 2022-03-28 NOTE — ED ATTENDING ATTESTATION
3/28/2022  I, Waqar Larkin MD, saw and evaluated the patient  I have discussed the patient with the resident/non-physician practitioner and agree with the resident's/non-physician practitioner's findings, Plan of Care, and MDM as documented in the resident's/non-physician practitioner's note, except where noted  All available labs and Radiology studies were reviewed  I was present for key portions of any procedure(s) performed by the resident/non-physician practitioner and I was immediately available to provide assistance  At this point I agree with the current assessment done in the Emergency Department  I have conducted an independent evaluation of this patient a history and physical is as follows:  Pt co tremors since alst night and elevated bp Pt drinks daily bourbon Last drink was Saturday  Pt denies cp sob nv diarrhea Pt denies focal neuro co pt states he is eating and drinking PE: alert tremulous heart tachy lungs clear abd soft nontender neuro nonfocal MDM: will treat withdrawal check labs    Pt given ativan and phenobarbital with some improvement in symptoms  Pt advised to stay in hospital for treatment warned of death and seizures  He has capacitance and wants to leave despite these warnings    Pt left prior to signing ama form   ED Course         Critical Care Time  CriticalCare Time  Performed by: Waqar Larkin MD  Authorized by:  Waqar Larkin MD     Critical care provider statement:     Critical care time (minutes):  32    Critical care time was exclusive of:  Separately billable procedures and treating other patients and teaching time    Critical care was necessary to treat or prevent imminent or life-threatening deterioration of the following conditions: symptomatic alcohol withdrawal requiring benzos and phenobarbital     Critical care was time spent personally by me on the following activities:  Obtaining history from patient or surrogate, evaluation of patient's response to treatment, examination of patient, ordering and performing treatments and interventions and re-evaluation of patient's condition    I assumed direction of critical care for this patient from another provider in my specialty: no

## 2022-03-28 NOTE — ED PROVIDER NOTES
History  Chief Complaint   Patient presents with    High Blood Pressure     Pt reports not feeling quite right yesterday  Today is tremulous and had a blood pressure 177/104  Doesn't report any other symptoms except fatigue   Tremors     81-year-old male patient presenting with elevated blood pressure and tremors onset yesterday  Patient states that he has been drinking 3-4 drinks of bourbon nightly since August 2021 when his wife passed away  Patient states yesterday he began feeling fatigue and this morning started having tremors  Patient states he took his blood pressure and was elevated today  Denies chest pain, fevers, shortness of breath, abdominal pain, nausea, vomiting, diarrhea, urinary symptoms  Patient denies any withdrawal symptoms in the past   Denies any other drug use  Patient lives at home with his daughter  Denies history of hypertension  Prior to Admission Medications   Prescriptions Last Dose Informant Patient Reported? Taking?    ALPRAZolam (XANAX) 0 25 mg tablet   No No   Sig: Take 1 tablet (0 25 mg total) by mouth daily at bedtime as needed for anxiety   ergocalciferol (VITAMIN D2) 50,000 units  Spouse/Significant Other No No   Sig: Take 1 capsule (50,000 Units total) by mouth once a week   gabapentin (NEURONTIN) 300 mg capsule   No No   Sig: TAKE 1 CAPSULE BY MOUTH AT BEDTIME   Patient not taking: Reported on 6/21/2021   hydrochlorothiazide (HYDRODIURIL) 25 mg tablet   No No   Sig: TAKE 1 TABLET BY MOUTH EVERY DAY   loratadine (CLARITIN) 10 mg tablet   No No   Sig: Take 1 tablet (10 mg total) by mouth daily   omeprazole (PriLOSEC) 40 MG capsule   No No   Sig: TAKE 1 CAPSULE BY MOUTH EVERY DAY   sulfaSALAzine (AZULFIDINE) 500 mg tablet   No No   Sig: TAKE 1 TABLET BY MOUTH TWICE A DAY      Facility-Administered Medications: None       Past Medical History:   Diagnosis Date    Abnormal ECG     Last Assessed 2/29/2016    Acute maxillary sinusitis 11/22/2019    Acute non-recurrent maxillary sinusitis 11/22/2019    Acute viral syndrome 2/7/2020    Calf swelling 9/26/2019    CAP (community acquired pneumonia) 10/12/2016    Lyme disease 6/21/2021    Weight loss 2/11/2019       Past Surgical History:   Procedure Laterality Date    INGUINAL HERNIA REPAIR         Family History   Problem Relation Age of Onset    Cancer Family     Colon cancer Father     COPD Father      I have reviewed and agree with the history as documented  E-Cigarette/Vaping    E-Cigarette Use Never User      E-Cigarette/Vaping Substances     Social History     Tobacco Use    Smoking status: Former Smoker    Smokeless tobacco: Current User     Types: Chew   Vaping Use    Vaping Use: Never used   Substance Use Topics    Alcohol use: Yes     Comment: social    Drug use: No        Review of Systems   Constitutional: Positive for fatigue  Negative for chills and fever  HENT: Negative for congestion, rhinorrhea and sore throat  Eyes: Negative for pain and visual disturbance  Respiratory: Negative for cough, chest tightness and shortness of breath  Cardiovascular: Negative for chest pain and leg swelling  Gastrointestinal: Negative for abdominal distention, abdominal pain, diarrhea, nausea and vomiting  Genitourinary: Negative for difficulty urinating and dysuria  Musculoskeletal: Negative for arthralgias, back pain and myalgias  Skin: Negative for rash and wound  Neurological: Positive for tremors  Negative for dizziness, weakness and headaches  All other systems reviewed and are negative        Physical Exam  ED Triage Vitals [03/28/22 1739]   Temperature Pulse Respirations Blood Pressure SpO2   98 2 °F (36 8 °C) (!) 117 19 (!) 196/116 100 %      Temp Source Heart Rate Source Patient Position - Orthostatic VS BP Location FiO2 (%)   Oral Monitor Sitting Right arm --      Pain Score       --             Orthostatic Vital Signs  Vitals:    03/28/22 1739 03/28/22 1856 03/28/22 1900 BP: (!) 196/116 (!) 184/90 (!) 188/87   Pulse: (!) 117 98 100   Patient Position - Orthostatic VS: Sitting         Physical Exam  Vitals reviewed  Constitutional:       Appearance: Normal appearance  HENT:      Head: Normocephalic and atraumatic  Nose: Nose normal       Mouth/Throat:      Mouth: Mucous membranes are dry  Pharynx: Oropharynx is clear  Eyes:      Extraocular Movements: Extraocular movements intact  Conjunctiva/sclera: Conjunctivae normal    Cardiovascular:      Rate and Rhythm: Regular rhythm  Tachycardia present  Pulses: Normal pulses  Heart sounds: Normal heart sounds  Pulmonary:      Effort: Pulmonary effort is normal       Breath sounds: Normal breath sounds  Abdominal:      General: Bowel sounds are normal       Palpations: Abdomen is soft  Tenderness: There is no abdominal tenderness  Musculoskeletal:         General: Normal range of motion  Cervical back: Normal range of motion  Skin:     General: Skin is warm and dry  Neurological:      General: No focal deficit present  Mental Status: He is alert and oriented to person, place, and time  Mental status is at baseline  Comments: Tremors in all 4 extremities and tongue         ED Medications  Medications   LORazepam (ATIVAN) injection 1 mg (1 mg Intravenous Given 3/28/22 1829)   sodium chloride 0 9 % bolus 1,000 mL (0 mL Intravenous Stopped 3/28/22 2117)   PHENobarbital 65 mg/mL injection 65 mg (65 mg Intravenous Given 3/28/22 1850)       Diagnostic Studies  Results Reviewed     Procedure Component Value Units Date/Time    TSH, 3rd generation with Free T4 reflex [[id number]]  (Normal) Collected: 03/28/22 1829    Lab Status: Final result Specimen: Blood from Arm, Left Updated: 03/28/22 1919     TSH 3RD GENERATON 1 410 uIU/mL     Narrative:      Patients undergoing fluorescein dye angiography may retain small amounts of fluorescein in the body for 48-72 hours post procedure   Samples containing fluorescein can produce falsely depressed TSH values  If the patient had this procedure,a specimen should be resubmitted post fluorescein clearance        Comprehensive metabolic panel [[id number]]  (Abnormal) Collected: 03/28/22 1829    Lab Status: Final result Specimen: Blood from Arm, Left Updated: 03/28/22 1912     Sodium 131 mmol/L      Potassium 4 4 mmol/L      Chloride 99 mmol/L      CO2 24 mmol/L      ANION GAP 8 mmol/L      BUN 18 mg/dL      Creatinine 1 17 mg/dL      Glucose 118 mg/dL      Calcium 9 0 mg/dL      AST 59 U/L      ALT 77 U/L      Alkaline Phosphatase 94 U/L      Total Protein 8 3 g/dL      Albumin 4 4 g/dL      Total Bilirubin 0 55 mg/dL      eGFR 65 ml/min/1 73sq m     Narrative:      [place] guidelines for Chronic Kidney Disease (CKD):     Stage 1 with normal or high GFR (GFR > 90 mL/min/1 73 square meters)    Stage 2 Mild CKD (GFR = 60-89 mL/min/1 73 square meters)    Stage 3A Moderate CKD (GFR = 45-59 mL/min/1 73 square meters)    Stage 3B Moderate CKD (GFR = 30-44 mL/min/1 73 square meters)    Stage 4 Severe CKD (GFR = 15-29 mL/min/1 73 square meters)    Stage 5 End Stage CKD (GFR <15 mL/min/1 73 square meters)  Note: GFR calculation is accurate only with a steady state creatinine    Magnesium [[id number]]  (Normal) Collected: 03/28/22 1829    Lab Status: Final result Specimen: Blood from Arm, Left Updated: 03/28/22 1912     Magnesium 1 7 mg/dL     Fingerstick Glucose (POCT) [[id number]]  (Normal) Collected: 03/28/22 1853    Lab Status: Final result Updated: 03/28/22 1854     POC Glucose 106 mg/dl     CBC and differential [[id number]]  (Abnormal) Collected: 03/28/22 1829    Lab Status: Final result Specimen: Blood from Arm, Left Updated: 03/28/22 1841     WBC 12 63 Thousand/uL      RBC 5 48 Million/uL      Hemoglobin 16 8 g/dL      Hematocrit 50 7 %      MCV 93 fL      MCH 30 7 pg      MCHC 33 1 g/dL      RDW 13 6 %      MPV 11 2 fL      Platelets 268 Thousands/uL      nRBC 0 /100 WBCs      Neutrophils Relative 67 %      Immat GRANS % 0 %      Lymphocytes Relative 21 %      Monocytes Relative 10 %      Eosinophils Relative 1 %      Basophils Relative 1 %      Neutrophils Absolute 8 53 Thousands/µL      Immature Grans Absolute 0 05 Thousand/uL      Lymphocytes Absolute 2 64 Thousands/µL      Monocytes Absolute 1 23 Thousand/µL      Eosinophils Absolute 0 06 Thousand/µL      Basophils Absolute 0 12 Thousands/µL                  No orders to display         Procedures  Procedures      ED Course  ED Course as of 03/29/22 1532   Mon Mar 28, 2022   1953 Spoke with patient about admission  Patient does not want to be admitted  Spoke with patient about risks of leaving including death  Patient states he will think about it  1956 Patient feeling improved  Still having tremors  2003 EKG: sinus tachycardia     2112 Spoke with patient about leaving AMA  Patient understood risks such as death, seizures, delirium tremens  Patient understands but states wants to be discharged  Signed AMA form  Will f/u with PCP tomorrw                                       MDM  Number of Diagnoses or Management Options  Alcohol withdrawal ([facility] [address] )  Diagnosis management comments: 67-year-old male patient presenting with alcohol withdrawal   Patient has tremors, high blood pressure  On exam, patient is has tremors and is tachycardic  Patient has been drinking for multiple months  Labs show mild leukocytosis but otherwise within normal limits  Patient treated with phenobarbital and Ativan  Spoke with patient about admission and detox Unit however patient wants to be discharged  Patient signed AMA and expressed understanding of risks of death, delirium tremens, seizures  States will follow up with PCP tomorrow  Told patient that he can come back any time         Amount and/or Complexity of Data Reviewed  Clinical lab tests: ordered and reviewed        Disposition  Final diagnoses: Alcohol withdrawal ([facility] [address] )     Time reflects when diagnosis was documented in both MDM as applicable and the Disposition within this note     Time User Action Codes Description Comment    3/28/2022  8:44 PM Saundra [facility] Add [T22 445] Alcohol withdrawal [facility])       ED Disposition     ED Disposition Condition Date/Time Comment    [place]  Mon Mar 28, 2022  9:02 PM Date: 3/28/2022  Patient: Alexandria De Jesus  Admitted: 3/28/2022  5:36 PM  Attending Provider: Frankie Hale MD    Alexandria De Jesus  or his authorized caregiver has made the decision for the patient to leave the emergency department against the  advice of his attending physician  He or his authorized caregiver has been informed and understands the inherent risks, including death, seizures, delirium tremens  He or his authorized caregiver has decided to accept the responsibility for this de cision  Alexandria De Jesus and all necessary parties have been advised that he may return for further evaluation or treatment  His condition at time of discharge was stable    Jagdish Colby Sr  had current vital signs as follows:  BP (!) 188/87 (BP L ocation: Right arm)   Pulse 100   Temp 98 2 °F (36 8 °C) (Oral)   Resp 20         Follow-up Information     Follow up With Specialties Details Why Contact Info    Cata Denton MD Family Medicine Schedule an appointment as soon as possible for a visit   [address] [place] [id number]  [phone]            Discharge Medication List as of 3/28/2022  9:02 PM      CONTINUE these medications which have NOT CHANGED    Details   ALPRAZolam (XANAX) 0 25 mg tablet Take 1 tablet (0 25 mg total) by mouth daily at bedtime as needed for anxiety, Starting Wed 9/29/2021, Normal      ergocalciferol (VITAMIN D2) 50,000 units Take 1 capsule (50,000 Units total) by mouth once a week, Starting Tue 10/8/2019, Normal      gabapentin (NEURONTIN) 300 mg capsule TAKE 1 CAPSULE BY MOUTH AT BEDTIME, Normal      hydrochlorothiazide (HYDRODIURIL) 25 mg tablet TAKE 1 TABLET BY MOUTH EVERY DAY, Normal      loratadine (CLARITIN) 10 mg tablet Take 1 tablet (10 mg total) by mouth daily, Starting Wed 9/29/2021, Normal      omeprazole (PriLOSEC) 40 MG capsule TAKE 1 CAPSULE BY MOUTH EVERY DAY, Normal      sulfaSALAzine (AZULFIDINE) 500 mg tablet TAKE 1 TABLET BY MOUTH TWICE A DAY, Normal           No discharge procedures on file  PDMP Review     None           ED Provider  Attending physically available and evaluated Gwendolynlatoya Rooneykhurram Scott I managed the patient along with the ED Attending      Electronically Signed by         Ambrocio Holt MD  03/29/22 1793

## 2022-03-29 NOTE — ED NOTES
Crisis was consulted and asked to provide out patient resources for drug and alcohol  Crisis met with patient and explained the importance of returning back to ED if his withdrawal symptoms get worse  Encouraged patient to call outpatient resources that could get him involved in a partial hospitalization program  Patient at this time denies any interest for inpatient treatment       Signed by Tammie Calderón (Crisis Worker)

## 2022-03-29 NOTE — DISCHARGE INSTRUCTIONS
You were seen in the ED for alcohol withdrawal  Return to the ED for any worsening symptoms or new symptoms  Follow up with your primary care doctor as soon as possible  Follow up with resources given to you

## 2022-03-30 LAB
ATRIAL RATE: 105 BPM
P AXIS: 51 DEGREES
PR INTERVAL: 140 MS
QRS AXIS: -9 DEGREES
QRSD INTERVAL: 86 MS
QT INTERVAL: 340 MS
QTC INTERVAL: 449 MS
T WAVE AXIS: 8 DEGREES
VENTRICULAR RATE: 105 BPM

## 2022-03-30 PROCEDURE — 93010 ELECTROCARDIOGRAM REPORT: CPT | Performed by: INTERNAL MEDICINE

## 2022-04-13 ENCOUNTER — OFFICE VISIT (OUTPATIENT)
Dept: FAMILY MEDICINE CLINIC | Facility: CLINIC | Age: 67
End: 2022-04-13
Payer: MEDICARE

## 2022-04-13 VITALS
HEIGHT: 67 IN | DIASTOLIC BLOOD PRESSURE: 82 MMHG | WEIGHT: 203 LBS | SYSTOLIC BLOOD PRESSURE: 132 MMHG | BODY MASS INDEX: 31.86 KG/M2

## 2022-04-13 DIAGNOSIS — R60.9 EDEMA, UNSPECIFIED TYPE: ICD-10-CM

## 2022-04-13 DIAGNOSIS — R35.0 BENIGN PROSTATIC HYPERPLASIA WITH URINARY FREQUENCY: ICD-10-CM

## 2022-04-13 DIAGNOSIS — Z12.11 ENCOUNTER FOR SCREENING COLONOSCOPY: ICD-10-CM

## 2022-04-13 DIAGNOSIS — Z12.5 SCREENING FOR PROSTATE CANCER: ICD-10-CM

## 2022-04-13 DIAGNOSIS — K21.9 GASTROESOPHAGEAL REFLUX DISEASE, UNSPECIFIED WHETHER ESOPHAGITIS PRESENT: ICD-10-CM

## 2022-04-13 DIAGNOSIS — M19.90 INFLAMMATORY ARTHRITIS: ICD-10-CM

## 2022-04-13 DIAGNOSIS — Z00.00 WELCOME TO MEDICARE PREVENTIVE VISIT: Primary | ICD-10-CM

## 2022-04-13 DIAGNOSIS — E78.5 HYPERLIPIDEMIA, UNSPECIFIED HYPERLIPIDEMIA TYPE: ICD-10-CM

## 2022-04-13 DIAGNOSIS — R79.89 ELEVATED SERUM CREATININE: ICD-10-CM

## 2022-04-13 DIAGNOSIS — N52.9 ERECTILE DYSFUNCTION, UNSPECIFIED ERECTILE DYSFUNCTION TYPE: ICD-10-CM

## 2022-04-13 DIAGNOSIS — I10 BENIGN ESSENTIAL HTN: ICD-10-CM

## 2022-04-13 DIAGNOSIS — N40.1 BENIGN PROSTATIC HYPERPLASIA WITH URINARY FREQUENCY: ICD-10-CM

## 2022-04-13 DIAGNOSIS — F43.22 ACUTE ADJUSTMENT DISORDER WITH ANXIETY: ICD-10-CM

## 2022-04-13 DIAGNOSIS — Z23 ENCOUNTER FOR IMMUNIZATION: ICD-10-CM

## 2022-04-13 PROBLEM — U07.1 COVID-19: Status: RESOLVED | Noted: 2021-08-11 | Resolved: 2022-04-13

## 2022-04-13 PROCEDURE — 1123F ACP DISCUSS/DSCN MKR DOCD: CPT | Performed by: FAMILY MEDICINE

## 2022-04-13 PROCEDURE — 99214 OFFICE O/P EST MOD 30 MIN: CPT | Performed by: FAMILY MEDICINE

## 2022-04-13 PROCEDURE — 90677 PCV20 VACCINE IM: CPT

## 2022-04-13 PROCEDURE — G0402 INITIAL PREVENTIVE EXAM: HCPCS | Performed by: FAMILY MEDICINE

## 2022-04-13 PROCEDURE — 36415 COLL VENOUS BLD VENIPUNCTURE: CPT | Performed by: FAMILY MEDICINE

## 2022-04-13 RX ORDER — SILDENAFIL 100 MG/1
100 TABLET, FILM COATED ORAL DAILY PRN
Qty: 10 TABLET | Refills: 0 | Status: SHIPPED | OUTPATIENT
Start: 2022-04-13 | End: 2022-06-09 | Stop reason: SDUPTHER

## 2022-04-13 NOTE — PROGRESS NOTES
Assessment and Plan:     Problem List Items Addressed This Visit        Cardiovascular and Mediastinum    Benign essential HTN    Relevant Orders    CBC    Comprehensive metabolic panel       Other    Welcome to Medicare preventive visit - Primary     The patient presents today for a [place] to Medicare visit  All components of the visit were addressed with the patient  Significant gaps in care were noted to be need for pneumococcal vaccination which was administered  He is overdue for colorectal cancer screening and request was made for this appointment  He does not need AAA or lung cancer screening  He is not a tobacco smoker  He has used oral nicotine products and he continues to do so  We counseled him as to the need to discontinue this  He had been drinking alcohol significantly due to the death of his wife from COVID-23  He had an emergency room visit about 3 weeks ago for alcohol withdrawal symptoms  He states that he has recovered from this and he has not had any alcohol to drink since that time  We encouraged him strongly to maintain sobriety  He has a family history of carcinoma in his father, he is unclear whether it is prostate or colon  We did order a GI referral for colonoscopy  Additionally will get a PSA today and follow up when the results are available  Anticipatory guidance was provided otherwise  He agrees with this plan  Hyperlipidemia    Relevant Orders    Lipid panel    Erectile dysfunction    Relevant Medications    sildenafil (VIAGRA) 100 mg tablet      Other Visit Diagnoses     Encounter for screening colonoscopy        Relevant Orders    Ambulatory referral for colonoscopy    Encounter for immunization        Relevant Orders    Pneumococcal Conjugate Vaccine 20-valent (Pcv20) (Completed)    Screening for prostate cancer        Relevant Orders    PSA, Total Screen        BMI Counseling: Body mass index is 31 79 kg/m²   The BMI is above normal  Nutrition recommendations include decreasing portion sizes, encouraging healthy choices of fruits and vegetables, consuming healthier snacks, limiting drinks that contain sugar and moderation in carbohydrate intake  Exercise recommendations include moderate physical activity 150 minutes/week and exercising 3-5 times per week  No pharmacotherapy was ordered  Rationale for BMI follow-up plan is due to patient being overweight or obese  Depression Screening and Follow-up Plan: Patient was screened for depression during today's encounter  They screened negative with a PHQ-2 score of 2  Preventive health issues were discussed with patient, and age appropriate screening tests were ordered as noted in patient's After Visit Summary  Personalized health advice and appropriate referrals for health education or preventive services given if needed, as noted in patient's After Visit Summary  History of Present Illness:     Patient presents for Welcome to Medicare visit  Patient Care Team:  Andrew Wheeler MD as PCP - Nan Lowery MD     Review of Systems:     Review of Systems   Constitutional: Negative  He has lost 20 lb through improved dietary habits as well as daily walking   Respiratory: Negative  Cardiovascular: Negative  Gastrointestinal: Negative  Endocrine: Negative for polydipsia, polyphagia and polyuria  Genitourinary: Negative  Musculoskeletal: Positive for arthralgias  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 for which he is treated with Azulfidine   Neurological: Negative  Hematological: Negative  Psychiatric/Behavioral: Negative         Problem List:     Patient Active Problem List   Diagnosis    Acid reflux disease    GERD with apnea    Hyperlipidemia    Erectile dysfunction    Allergic cough    Benign prostatic hyperplasia with urinary frequency    Inflammatory arthritis    Rotator cuff arthropathy of right shoulder    Benign essential HTN  Edema    Elevated serum creatinine    Acute adjustment disorder with anxiety    Welcome to Medicare preventive visit      Past Medical and Surgical History:     Past Medical History:   Diagnosis Date    Abnormal ECG     Last Assessed 2/29/2016    Acute maxillary sinusitis 11/22/2019    Acute non-recurrent maxillary sinusitis 11/22/2019    Acute viral syndrome 2/7/2020    Calf swelling 9/26/2019    CAP (community acquired pneumonia) 10/12/2016    COVID-19 8/11/2021    Lyme disease 6/21/2021    Weight loss 2/11/2019     Past Surgical History:   Procedure Laterality Date    INGUINAL HERNIA REPAIR        Family History:     Family History   Problem Relation Age of Onset   Liat Vazquez Cancer Family     Colon cancer Father     COPD Father       Social History:     Social History     Socioeconomic History    Marital status:       Spouse name: None    Number of children: None    Years of education: None    Highest education level: None   Occupational History    None   Tobacco Use    Smoking status: Former Smoker    Smokeless tobacco: Current User     Types: Chew   Vaping Use    Vaping Use: Never used   Substance and Sexual Activity    Alcohol use: Yes     Comment: social    Drug use: No    Sexual activity: Yes     Partners: Female     Comment: Wife   Other Topics Concern    None   Social History Narrative    None     Social Determinants of Health     Financial Resource Strain: Not on file   Food Insecurity: Not on file   Transportation Needs: Not on file   Physical Activity: Not on file   Stress: Not on file   Social Connections: Not on file   Intimate Partner Violence: Not on file   Housing Stability: Not on file      Medications and Allergies:     Current Outpatient Medications   Medication Sig Dispense Refill    omeprazole (PriLOSEC) 40 MG capsule TAKE 1 CAPSULE BY MOUTH EVERY DAY 90 capsule 0    sulfaSALAzine (AZULFIDINE) 500 mg tablet TAKE 1 TABLET BY MOUTH TWICE A  tablet 1    sildenafil (VIAGRA) 100 mg tablet Take 1 tablet (100 mg total) by mouth daily as needed for erectile dysfunction 10 tablet 0     No current facility-administered medications for this visit  No Known Allergies   Immunizations:     Immunization History   Administered Date(s) Administered    COVID-19 MODERNA VACC 0 5 ML IM 03/04/2021, 04/01/2021    INFLUENZA 11/18/2013, 11/30/2016    Influenza, injectable, quadrivalent, preservative free 0 5 mL 03/05/2020    Pneumococcal Conjugate Vaccine 20-valent (Pcv20), Polysace 04/13/2022    Tdap 02/19/2015    Zoster Vaccine Recombinant 03/27/2019, 07/26/2019      Health Maintenance:         Topic Date Due    Colorectal Cancer Screening  Never done    HIV Screening  Completed    Hepatitis C Screening  Completed         Topic Date Due    Pneumococcal Vaccine: 65+ Years (1 of 2 - PPSV23) 11/08/1961    COVID-19 Vaccine (3 - Booster for Donzella Creed series) 09/01/2021      Medicare Screening Tests and Risk Assessments:     Benton Porter is here for his Welcome to Medicare visit  Health Risk Assessment:   Patient rates overall health as good  Patient feels that their physical health rating is same  Patient is dissatisfied with their life  Eyesight was rated as same  Hearing was rated as same  Patient feels that their emotional and mental health rating is slightly worse  Patients states they are never, rarely angry  Patient states they are often unusually tired/fatigued  Pain experienced in the last 7 days has been some  Patient's pain rating has been 3/10  Patient states that he has experienced no weight loss or gain in last 6 months  Small joints of hands    Depression Screening:   PHQ-2 Score: 2      Fall Risk Screening: In the past year, patient has experienced: no history of falling in past year      Home Safety:  Patient does not have trouble with stairs inside or outside of their home  Patient has working smoke alarms and has working carbon monoxide detector   Home safety hazards include: none  Better with treatment through rheumatology  Walking daily and has lost 20 pounds    Nutrition:   Current diet is No Added Salt  Medications:   Patient is currently taking over-the-counter supplements  OTC medications include: see medication list  Patient is able to manage medications  Activities of Daily Living (ADLs)/Instrumental Activities of Daily Living (IADLs):   Walk and transfer into and out of bed and chair?: Yes  Dress and groom yourself?: Yes    Bathe or shower yourself?: Yes    Feed yourself? Yes  Do your laundry/housekeeping?: Yes  Manage your money, pay your bills and track your expenses?: Yes  Make your own meals?: Yes    Do your own shopping?: Yes    Durable Medical Equipment Suppliers  None    Previous Hospitalizations:   Any hospitalizations or ED visits within the last 12 months?: Yes      Advance Care Planning:   Living will: No    Durable POA for healthcare: No    Advanced directive: No      Comments: 5 Wishes    Cognitive Screening:   Provider or family/friend/caregiver concerned regarding cognition?: No    PREVENTIVE SCREENINGS      Cardiovascular Screening:    General: Screening Not Indicated and History Lipid Disorder      Diabetes Screening:     General: Screening Current      Colorectal Cancer Screening:     General: Risks and Benefits Discussed    Due for: Colonoscopy - High Risk      Prostate Cancer Screening:    General: Risks and Benefits Discussed    Due for: PSA      Abdominal Aortic Aneurysm (AAA) Screening:    Risk factors include: age between 73-69 yo and tobacco use        General: Risks and Benefits Discussed and Screening Not Indicated      Lung Cancer Screening:     General: Screening Not Indicated      Hepatitis C Screening:    General: Screening Current      Preventive Screening Comments: Chewing tobacco / dip   No cigarettes    Screening, Brief Intervention, and Referral to Treatment (SBIRT)    Screening  Typical number of drinks in a day: 0  Typical number of drinks in a week: 0  Interpretation: Low risk drinking behavior  Single Item Drug Screening:  How often have you used an illegal drug (including marijuana) or a prescription medication for non-medical reasons in the past year? never    Single Item Drug Screen Score: 0  Interpretation: Negative screen for possible drug use disorder     Visual Acuity Screening    Right eye Left eye Both eyes   Without correction:      With correction: 20/30 20/40 20/30        Physical Exam:     /82 (BP Location: Left arm, Patient Position: Sitting, Cuff Size: Large)   Ht 5' 7" (1 702 m)   Wt 92 1 kg (203 lb)   BMI 31 79 kg/m²     Physical Exam  Constitutional:       Comments: Overweight, in no apparent distress   HENT:      Mouth/Throat:      Mouth: Mucous membranes are moist       Pharynx: Oropharynx is clear  No oropharyngeal exudate or posterior oropharyngeal erythema  Comments: No lesion of the oral cavity / oropharynx  Neck:      Vascular: No carotid bruit  Comments: No JVD  Cardiovascular:      Rate and Rhythm: Normal rate and regular rhythm  Heart sounds: No murmur heard  Pulmonary:      Effort: Pulmonary effort is normal       Breath sounds: Normal breath sounds  No wheezing, rhonchi or rales  Musculoskeletal:         General: No swelling or deformity  Right lower leg: No edema  Left lower leg: No edema  Neurological:      Mental Status: He is alert and oriented to person, place, and time  Psychiatric:         Thought Content:  Thought content normal          Judgment: Judgment normal       Comments: Depressed affect which is chronic          Vannesa Garcia MD

## 2022-04-13 NOTE — PROGRESS NOTES
Assessment/Plan:  Welcome to Medicare preventive visit  The patient presents today for a [place] to Medicare visit  All components of the visit were addressed with the patient  Significant gaps in care were noted to be need for pneumococcal vaccination which was administered  He is overdue for colorectal cancer screening and request was made for this appointment  He does not need AAA or lung cancer screening  He is not a tobacco smoker  He has used oral nicotine products and he continues to do so  We counseled him as to the need to discontinue this  He had been drinking alcohol significantly due to the death of his wife from COVID-23  He had an emergency room visit about 3 weeks ago for alcohol withdrawal symptoms  He states that he has recovered from this and he has not had any alcohol to drink since that time  We encouraged him strongly to maintain sobriety  He has a family history of carcinoma in his father, he is unclear whether it is prostate or colon  We did order a GI referral for colonoscopy  Additionally will get a PSA today and follow up when the results are available  Anticipatory guidance was provided otherwise  He agrees with this plan  Acid reflux disease  Continue with omeprazole  Attempts at weaning have been unsuccessful  He has no worrisome symptoms presently  Benign essential HTN  Blood pressure normal today  CBC and CMP today  Inflammatory arthritis  Continue with Azulfidine as well as follow-up with Rheumatology, avoid all nonsteroidals    Acute adjustment disorder with anxiety  He is currently taking no medication  He was having issues with excessive alcohol use and recently went through a period withdrawal   Presently he has had no alcohol intake for at least a month  He walks daily as watching his diet and lost 20 lb  He is congratulated for this in asked to continue with his sober lifestyle      Benign prostatic hyperplasia with urinary frequency  He has a potential family history of prostate carcinoma though this is not clear  We are going to get a PSA today  He is not having significant symptoms of prostatism presently  Edema  Resolved on examination today  Elevated serum creatinine  CMP today  Last creatinine was normal     Erectile dysfunction  We gave him a prescription for sildenafil today  Hyperlipidemia  Lipid profile today  Diagnoses and all orders for this visit:    Welcome to Medicare preventive visit    Benign essential HTN  -     CBC  -     Comprehensive metabolic panel    Hyperlipidemia, unspecified hyperlipidemia type  -     Lipid panel    Encounter for screening colonoscopy  -     Ambulatory referral for colonoscopy; Future    Erectile dysfunction, unspecified erectile dysfunction type  -     sildenafil (VIAGRA) 100 mg tablet; Take 1 tablet (100 mg total) by mouth daily as needed for erectile dysfunction    Encounter for immunization  -     Pneumococcal Conjugate Vaccine 20-valent (Pcv20)    Screening for prostate cancer  -     PSA, Total Screen    Gastroesophageal reflux disease, unspecified whether esophagitis present    Inflammatory arthritis    Acute adjustment disorder with anxiety    Benign prostatic hyperplasia with urinary frequency    Edema, unspecified type    Elevated serum creatinine          Subjective:   Chief Complaint   Patient presents with    Medicare Wellness Visit     Welcome to Medicare        Patient ID: Ghislaine Reed  is a 73 y o  male  HPI  The patient is a 62-year-old male who presents today with several issues  He continues to have some gastroesophageal reflux  Attempts at discontinuing omeprazole lead to recurrence of his symptoms  He has no diet aphasia or dysphagia, anorexia or unexpected weight loss  He has a history of hypertension but he is off antihypertensives and his blood pressure is normal   He has been walking daily watching his diet and has lost 20 lb    He has inflammatory arthritis/inflammatory spondyloarthropathy for which he walks follows with Rheumatology and takes Azulfidine  He has a history of hyperlipidemia , BPH and elevated serum creatinine  He has a history of anxiety and adjustment disorder due to his wife's death with recent ER visit for alcohol withdrawal  and erectile dysfunction  The following portions of the patient's history were reviewed and updated as appropriate: allergies, current medications, past family history, past medical history, past social history, past surgical history and problem list     ROS    Per the HPI  No cardiovascular pulmonary complaint  No headache diplopia focal neurologic symptom X cetera  Objective:    Physical Exam    Physical examination documented in the Welcome to Medicare visit

## 2022-04-13 NOTE — ASSESSMENT & PLAN NOTE
He is currently taking no medication  He was having issues with excessive alcohol use and recently went through a period withdrawal   Presently he has had no alcohol intake for at least a month  He walks daily as watching his diet and lost 20 lb  He is congratulated for this in asked to continue with his sober lifestyle

## 2022-04-13 NOTE — PATIENT INSTRUCTIONS
Medicare Preventive Visit Patient Instructions  Thank you for completing your Welcome to Medicare Visit or Medicare Annual Wellness Visit today  Your next wellness visit will be due in one year (4/14/2023)  The screening/preventive services that you may require over the next 5-10 years are detailed below  Some tests may not apply to you based off risk factors and/or age  Screening tests ordered at today's visit but not completed yet may show as past due  Also, please note that scanned in results may not display below  Preventive Screenings:  Service Recommendations Previous Testing/Comments   Colorectal Cancer Screening  · Colonoscopy    · Fecal Occult Blood Test (FOBT)/Fecal Immunochemical Test (FIT)  · Fecal DNA/Cologuard Test  · Flexible Sigmoidoscopy Age: 54-65 years old   Colonoscopy: every 10 years (May be performed more frequently if at higher risk)  OR  FOBT/FIT: every 1 year  OR  Cologuard: every 3 years  OR  Sigmoidoscopy: every 5 years  Screening may be recommended earlier than age 48 if at higher risk for colorectal cancer  Also, an individualized decision between you and your healthcare provider will decide whether screening between the ages of 74-80 would be appropriate   Colonoscopy: Not on file  FOBT/FIT: Not on file  Cologuard: Not on file  Sigmoidoscopy: Not on file          Prostate Cancer Screening Individualized decision between patient and health care provider in men between ages of 53-78   Medicare will cover every 12 months beginning on the day after your 50th birthday PSA: 0 8 ng/mL           Hepatitis C Screening Once for adults born between 1945 and 1965  More frequently in patients at high risk for Hepatitis C Hep C Antibody: 10/04/2019    Screening Current   Diabetes Screening 1-2 times per year if you're at risk for diabetes or have pre-diabetes Fasting glucose: 109 mg/dL   A1C: No results in last 5 years    Screening Current   Cholesterol Screening Once every 5 years if you don't have a lipid disorder  May order more often based on risk factors  Lipid panel: 09/15/2018    Screening Not Indicated  History Lipid Disorder      Other Preventive Screenings Covered by Medicare:  1  Abdominal Aortic Aneurysm (AAA) Screening: covered once if your at risk  You're considered to be at risk if you have a family history of AAA or a male between the age of 73-68 who smoking at least 100 cigarettes in your lifetime  2  Lung Cancer Screening: covers low dose CT scan once per year if you meet all of the following conditions: (1) Age 50-69; (2) No signs or symptoms of lung cancer; (3) Current smoker or have quit smoking within the last 15 years; (4) You have a tobacco smoking history of at least 30 pack years (packs per day x number of years you smoked); (5) You get a written order from a healthcare provider  3  Glaucoma Screening: covered annually if you're considered high risk: (1) You have diabetes OR (2) Family history of glaucoma OR (3)  aged 48 and older OR (3)  American aged 72 and older  3  Osteoporosis Screening: covered every 2 years if you meet one of the following conditions: (1) Have a vertebral abnormality; (2) On glucocorticoid therapy for more than 3 months; (3) Have primary hyperparathyroidism; (4) On osteoporosis medications and need to assess response to drug therapy  5  HIV Screening: covered annually if you're between the age of 12-76  Also covered annually if you are younger than 13 and older than 72 with risk factors for HIV infection  For pregnant patients, it is covered up to 3 times per pregnancy      Immunizations:  Immunization Recommendations   Influenza Vaccine Annual influenza vaccination during flu season is recommended for all persons aged >= 6 months who do not have contraindications   Pneumococcal Vaccine (Prevnar and Pneumovax)  * Prevnar = PCV13  * Pneumovax = PPSV23 Adults 25-60 years old: 1-3 doses may be recommended based on certain risk factors  Adults 72 years old: Prevnar (PCV13) vaccine recommended followed by Pneumovax (PPSV23) vaccine  If already received PPSV23 since turning 65, then PCV13 recommended at least one year after PPSV23 dose  Hepatitis B Vaccine 3 dose series if at intermediate or high risk (ex: diabetes, end stage renal disease, liver disease)   Tetanus (Td) Vaccine - COST NOT COVERED BY MEDICARE PART B Following completion of primary series, a booster dose should be given every 10 years to maintain immunity against tetanus  Td may also be given as tetanus wound prophylaxis  Tdap Vaccine - COST NOT COVERED BY MEDICARE PART B Recommended at least once for all adults  For pregnant patients, recommended with each pregnancy  Shingles Vaccine (Shingrix) - COST NOT COVERED BY MEDICARE PART B  2 shot series recommended in those aged 48 and above     Health Maintenance Due:      Topic Date Due    Colorectal Cancer Screening  Never done    HIV Screening  Completed    Hepatitis C Screening  Completed     Immunizations Due:      Topic Date Due    Pneumococcal Vaccine: 65+ Years (1 of 2 - PPSV23) Never done    COVID-19 Vaccine (3 - Booster for Juris Ring series) 09/01/2021     Advance Directives   What are advance directives? Advance directives are legal documents that state your wishes and plans for medical care  These plans are made ahead of time in case you lose your ability to make decisions for yourself  Advance directives can apply to any medical decision, such as the treatments you want, and if you want to donate organs  What are the types of advance directives? There are many types of advance directives, and each state has rules about how to use them  You may choose a combination of any of the following:  · Living will: This is a written record of the treatment you want  You can also choose which treatments you do not want, which to limit, and which to stop at a certain time   This includes surgery, medicine, IV fluid, and tube feedings  · Durable power of  for healthcare [facility]): This is a written record that states who you want to make healthcare choices for you when you are unable to make them for yourself  This person, called a proxy, is usually a family member or a friend  You may choose more than 1 proxy  · Do not resuscitate (DNR) order:  A DNR order is used in case your heart stops beating or you stop breathing  It is a request not to have certain forms of treatment, such as CPR  A DNR order may be included in other types of advance directives  · Medical directive: This covers the care that you want if you are in a coma, near death, or unable to make decisions for yourself  You can list the treatments you want for each condition  Treatment may include pain medicine, surgery, blood transfusions, dialysis, IV or tube feedings, and a ventilator (breathing machine)  · Values history: This document has questions about your views, beliefs, and how you feel and think about life  This information can help others choose the care that you would choose  Why are advance directives important? An advance directive helps you control your care  Although spoken wishes may be used, it is better to have your wishes written down  Spoken wishes can be misunderstood, or not followed  Treatments may be given even if you do not want them  An advance directive may make it easier for your family to make difficult choices about your care  How to Quit Using Smokeless Tobacco   Why it is important to stop using smokeless tobacco:  Smokeless tobacco comes in many forms  Examples include chew, snuff, dip, dissolvable tobacco, and snus  All smokeless tobacco products contain nicotine and may contain as much nicotine as 3 cigarettes  You may be physically dependent on nicotine  You may also be emotionally addicted to it   The cravings can be strong, but it is important to quit using smokeless tobacco  You will improve your health and decrease your cancer, stroke, and heart attack risk  Mouth sores and tooth problems will also improve when you quit  You can benefit from quitting no matter how long you have used smokeless tobacco    Prepare to stop using smokeless tobacco:  Nicotine is a highly addictive drug  Withdrawal symptoms can happen when you stop and make it hard to quit  The following can help keep you on track:  · Set a quit date  · Tell friends, family, and coworkers that you plan to quit  · Remove all smokeless tobacco products from your home, car, and workplace  Manage weight gain after you quit:  Nicotine can affect your metabolism  You may gain a few pounds after you quit  The following can help you control your weight:  · Eat healthy foods  · Drink water before, during, and between meals  · Exercise as directed  Weight Management   Why it is important to manage your weight:  Being overweight increases your risk of health conditions such as heart disease, high blood pressure, type 2 diabetes, and certain types of cancer  It can also increase your risk for osteoarthritis, sleep apnea, and other respiratory problems  Aim for a slow, steady weight loss  Even a small amount of weight loss can lower your risk of health problems  How to lose weight safely:  A safe and healthy way to lose weight is to eat fewer calories and get regular exercise  You can lose up about 1 pound a week by decreasing the number of calories you eat by 500 calories each day  Healthy meal plan for weight management:  A healthy meal plan includes a variety of foods, contains fewer calories, and helps you stay healthy  A healthy meal plan includes the following:  · Eat whole-grain foods more often  A healthy meal plan should contain fiber  Fiber is the part of grains, fruits, and vegetables that is not broken down by your body  Whole-grain foods are healthy and provide extra fiber in your diet   Some examples of whole-grain foods are whole-wheat breads and pastas, oatmeal, brown rice, and bulgur  · Eat a variety of vegetables every day  Include dark, leafy greens such as spinach, kale, james greens, and mustard greens  Eat yellow and orange vegetables such as carrots, sweet potatoes, and winter squash  · Eat a variety of fruits every day  Choose fresh or canned fruit (canned in its own juice or light syrup) instead of juice  Fruit juice has very little or no fiber  · Eat low-fat dairy foods  Drink fat-free (skim) milk or 1% milk  Eat fat-free yogurt and low-fat cottage cheese  Try low-fat cheeses such as mozzarella and other reduced-fat cheeses  · Choose meat and other protein foods that are low in fat  Choose beans or other legumes such as split peas or lentils  Choose fish, skinless poultry (chicken or turkey), or lean cuts of red meat (beef or pork)  Before you cook meat or poultry, cut off any visible fat  · Use less fat and oil  Try baking foods instead of frying them  Add less fat, such as margarine, sour cream, regular salad dressing and mayonnaise to foods  Eat fewer high-fat foods  Some examples of high-fat foods include french fries, doughnuts, ice cream, and cakes  · Eat fewer sweets  Limit foods and drinks that are high in sugar  This includes candy, cookies, regular soda, and sweetened drinks  Exercise:  Exercise at least 30 minutes per day on most days of the week  Some examples of exercise include walking, biking, dancing, and swimming  You can also fit in more physical activity by taking the stairs instead of the elevator or parking farther away from stores  Ask your healthcare provider about the best exercise plan for you  © Copyright [employer] 2018 Information is for End User's use only and may not be sold, redistributed or otherwise used for commercial purposes   All illustrations and images included in CareNotes® are the copyrighted property of [employer] A M , Inc  or [facility] Preventive Visit Patient Instructions  Thank you for completing your Welcome to Medicare Visit or Medicare Annual Wellness Visit today  Your next wellness visit will be due in one year (4/14/2023)  The screening/preventive services that you may require over the next 5-10 years are detailed below  Some tests may not apply to you based off risk factors and/or age  Screening tests ordered at today's visit but not completed yet may show as past due  Also, please note that scanned in results may not display below  Preventive Screenings:  Service Recommendations Previous Testing/Comments   Colorectal Cancer Screening  · Colonoscopy    · Fecal Occult Blood Test (FOBT)/Fecal Immunochemical Test (FIT)  · Fecal DNA/Cologuard Test  · Flexible Sigmoidoscopy Age: 54-65 years old   Colonoscopy: every 10 years (May be performed more frequently if at higher risk)  OR  FOBT/FIT: every 1 year  OR  Cologuard: every 3 years  OR  Sigmoidoscopy: every 5 years  Screening may be recommended earlier than age 48 if at higher risk for colorectal cancer  Also, an individualized decision between you and your healthcare provider will decide whether screening between the ages of 74-80 would be appropriate   Colonoscopy: Not on file  FOBT/FIT: Not on file  Cologuard: Not on file  Sigmoidoscopy: Not on file    Risks and Benefits Discussed  Due for Colonoscopy - High Risk     Prostate Cancer Screening Individualized decision between patient and health care provider in men between ages of 53-78   Medicare will cover every 12 months beginning on the day after your 50th birthday PSA: 0 8 ng/mL     Risks and Benefits Discussed  Due for PSA     Hepatitis C Screening Once for adults born between 1945 and 1965  More frequently in patients at high risk for Hepatitis C Hep C Antibody: 10/04/2019    Screening Current   Diabetes Screening 1-2 times per year if you're at risk for diabetes or have pre-diabetes Fasting glucose: 109 mg/dL   A1C: No results in last 5 years    Screening Current   Cholesterol Screening Once every 5 years if you don't have a lipid disorder  May order more often based on risk factors  Lipid panel: 09/15/2018    Screening Not Indicated  History Lipid Disorder      Other Preventive Screenings Covered by Medicare:  6  Abdominal Aortic Aneurysm (AAA) Screening: covered once if your at risk  You're considered to be at risk if you have a family history of AAA or a male between the age of 73-68 who smoking at least 100 cigarettes in your lifetime  7  Lung Cancer Screening: covers low dose CT scan once per year if you meet all of the following conditions: (1) Age 50-69; (2) No signs or symptoms of lung cancer; (3) Current smoker or have quit smoking within the last 15 years; (4) You have a tobacco smoking history of at least 30 pack years (packs per day x number of years you smoked); (5) You get a written order from a healthcare provider  8  Glaucoma Screening: covered annually if you're considered high risk: (1) You have diabetes OR (2) Family history of glaucoma OR (3)  aged 48 and older OR (3)  American aged 72 and older  5  Osteoporosis Screening: covered every 2 years if you meet one of the following conditions: (1) Have a vertebral abnormality; (2) On glucocorticoid therapy for more than 3 months; (3) Have primary hyperparathyroidism; (4) On osteoporosis medications and need to assess response to drug therapy  10  HIV Screening: covered annually if you're between the age of 12-76  Also covered annually if you are younger than 13 and older than 72 with risk factors for HIV infection  For pregnant patients, it is covered up to 3 times per pregnancy      Immunizations:  Immunization Recommendations   Influenza Vaccine Annual influenza vaccination during flu season is recommended for all persons aged >= 6 months who do not have contraindications   Pneumococcal Vaccine (Prevnar and Pneumovax)  * Prevnar = PCV13  * Pneumovax = PPSV23 Adults 25-60 years old: 1-3 doses may be recommended based on certain risk factors  Adults 72 years old: Prevnar (PCV13) vaccine recommended followed by Pneumovax (PPSV23) vaccine  If already received PPSV23 since turning 65, then PCV13 recommended at least one year after PPSV23 dose  Hepatitis B Vaccine 3 dose series if at intermediate or high risk (ex: diabetes, end stage renal disease, liver disease)   Tetanus (Td) Vaccine - COST NOT COVERED BY MEDICARE PART B Following completion of primary series, a booster dose should be given every 10 years to maintain immunity against tetanus  Td may also be given as tetanus wound prophylaxis  Tdap Vaccine - COST NOT COVERED BY MEDICARE PART B Recommended at least once for all adults  For pregnant patients, recommended with each pregnancy  Shingles Vaccine (Shingrix) - COST NOT COVERED BY MEDICARE PART B  2 shot series recommended in those aged 48 and above     Health Maintenance Due:      Topic Date Due    Colorectal Cancer Screening  Never done    HIV Screening  Completed    Hepatitis C Screening  Completed     Immunizations Due:      Topic Date Due    Pneumococcal Vaccine: 65+ Years (1 of 2 - PPSV23) 11/08/1961    COVID-19 Vaccine (3 - Booster for Elois Jump series) 09/01/2021     Advance Directives   What are advance directives? Advance directives are legal documents that state your wishes and plans for medical care  These plans are made ahead of time in case you lose your ability to make decisions for yourself  Advance directives can apply to any medical decision, such as the treatments you want, and if you want to donate organs  What are the types of advance directives? There are many types of advance directives, and each state has rules about how to use them  You may choose a combination of any of the following:  · Living will: This is a written record of the treatment you want   You can also choose which treatments you do not want, which to limit, and which to stop at a certain time  This includes surgery, medicine, IV fluid, and tube feedings  · Durable power of  for healthcare [place] [facility]): This is a written record that states who you want to make healthcare choices for you when you are unable to make them for yourself  This person, called a proxy, is usually a family member or a friend  You may choose more than 1 proxy  · Do not resuscitate (DNR) order:  A DNR order is used in case your heart stops beating or you stop breathing  It is a request not to have certain forms of treatment, such as CPR  A DNR order may be included in other types of advance directives  · Medical directive: This covers the care that you want if you are in a coma, near death, or unable to make decisions for yourself  You can list the treatments you want for each condition  Treatment may include pain medicine, surgery, blood transfusions, dialysis, IV or tube feedings, and a ventilator (breathing machine)  · Values history: This document has questions about your views, beliefs, and how you feel and think about life  This information can help others choose the care that you would choose  Why are advance directives important? An advance directive helps you control your care  Although spoken wishes may be used, it is better to have your wishes written down  Spoken wishes can be misunderstood, or not followed  Treatments may be given even if you do not want them  An advance directive may make it easier for your family to make difficult choices about your care  How to Quit Using Smokeless Tobacco   Why it is important to stop using smokeless tobacco:  Smokeless tobacco comes in many forms  Examples include chew, snuff, dip, dissolvable tobacco, and snus  All smokeless tobacco products contain nicotine and may contain as much nicotine as 3 cigarettes  You may be physically dependent on nicotine  You may also be emotionally addicted to it   The cravings can be strong, but it is important to quit using smokeless tobacco  You will improve your health and decrease your cancer, stroke, and heart attack risk  Mouth sores and tooth problems will also improve when you quit  You can benefit from quitting no matter how long you have used smokeless tobacco    Prepare to stop using smokeless tobacco:  Nicotine is a highly addictive drug  Withdrawal symptoms can happen when you stop and make it hard to quit  The following can help keep you on track:  · Set a quit date  · Tell friends, family, and coworkers that you plan to quit  · Remove all smokeless tobacco products from your home, car, and workplace  Manage weight gain after you quit:  Nicotine can affect your metabolism  You may gain a few pounds after you quit  The following can help you control your weight:  · Eat healthy foods  · Drink water before, during, and between meals  · Exercise as directed  Weight Management   Why it is important to manage your weight:  Being overweight increases your risk of health conditions such as heart disease, high blood pressure, type 2 diabetes, and certain types of cancer  It can also increase your risk for osteoarthritis, sleep apnea, and other respiratory problems  Aim for a slow, steady weight loss  Even a small amount of weight loss can lower your risk of health problems  How to lose weight safely:  A safe and healthy way to lose weight is to eat fewer calories and get regular exercise  You can lose up about 1 pound a week by decreasing the number of calories you eat by 500 calories each day  Healthy meal plan for weight management:  A healthy meal plan includes a variety of foods, contains fewer calories, and helps you stay healthy  A healthy meal plan includes the following:  · Eat whole-grain foods more often  A healthy meal plan should contain fiber  Fiber is the part of grains, fruits, and vegetables that is not broken down by your body   Whole-grain foods are healthy and provide extra fiber in your diet  Some examples of whole-grain foods are whole-wheat breads and pastas, oatmeal, brown rice, and bulgur  · Eat a variety of vegetables every day  Include dark, leafy greens such as spinach, kale, james greens, and mustard greens  Eat yellow and orange vegetables such as carrots, sweet potatoes, and winter squash  · Eat a variety of fruits every day  Choose fresh or canned fruit (canned in its own juice or light syrup) instead of juice  Fruit juice has very little or no fiber  · Eat low-fat dairy foods  Drink fat-free (skim) milk or 1% milk  Eat fat-free yogurt and low-fat cottage cheese  Try low-fat cheeses such as mozzarella and other reduced-fat cheeses  · Choose meat and other protein foods that are low in fat  Choose beans or other legumes such as split peas or lentils  Choose fish, skinless poultry (chicken or turkey), or lean cuts of red meat (beef or pork)  Before you cook meat or poultry, cut off any visible fat  · Use less fat and oil  Try baking foods instead of frying them  Add less fat, such as margarine, sour cream, regular salad dressing and mayonnaise to foods  Eat fewer high-fat foods  Some examples of high-fat foods include french fries, doughnuts, ice cream, and cakes  · Eat fewer sweets  Limit foods and drinks that are high in sugar  This includes candy, cookies, regular soda, and sweetened drinks  Exercise:  Exercise at least 30 minutes per day on most days of the week  Some examples of exercise include walking, biking, dancing, and swimming  You can also fit in more physical activity by taking the stairs instead of the elevator or parking farther away from stores  Ask your healthcare provider about the best exercise plan for you  © Copyright [employer] 2018 Information is for End User's use only and may not be sold, redistributed or otherwise used for commercial purposes   All illustrations and images included in CareNotes® are the copyrighted property of [employer]  or [facility] Preventive Visit Patient Instructions  Thank you for completing your Welcome to Medicare Visit or Medicare Annual Wellness Visit today  Your next wellness visit will be due in one year (4/14/2023)  The screening/preventive services that you may require over the next 5-10 years are detailed below  Some tests may not apply to you based off risk factors and/or age  Screening tests ordered at today's visit but not completed yet may show as past due  Also, please note that scanned in results may not display below  Preventive Screenings:  Service Recommendations Previous Testing/Comments   Colorectal Cancer Screening  · Colonoscopy    · Fecal Occult Blood Test (FOBT)/Fecal Immunochemical Test (FIT)  · Fecal DNA/Cologuard Test  · Flexible Sigmoidoscopy Age: 54-65 years old   Colonoscopy: every 10 years (May be performed more frequently if at higher risk)  OR  FOBT/FIT: every 1 year  OR  Cologuard: every 3 years  OR  Sigmoidoscopy: every 5 years  Screening may be recommended earlier than age 48 if at higher risk for colorectal cancer  Also, an individualized decision between you and your healthcare provider will decide whether screening between the ages of 74-80 would be appropriate   Colonoscopy: Not on file  FOBT/FIT: Not on file  Cologuard: Not on file  Sigmoidoscopy: Not on file    Risks and Benefits Discussed  Due for Colonoscopy - High Risk     Prostate Cancer Screening Individualized decision between patient and health care provider in men between ages of 53-78   Medicare will cover every 12 months beginning on the day after your 50th birthday PSA: 0 8 ng/mL     Risks and Benefits Discussed  Due for PSA     Hepatitis C Screening Once for adults born between 1945 and 1965  More frequently in patients at high risk for Hepatitis C Hep C Antibody: 10/04/2019    Screening Current   Diabetes Screening 1-2 times per year if you're at risk for diabetes or have pre-diabetes Fasting glucose: 109 mg/dL   A1C: No results in last 5 years    Screening Current   Cholesterol Screening Once every 5 years if you don't have a lipid disorder  May order more often based on risk factors  Lipid panel: 09/15/2018    Screening Not Indicated  History Lipid Disorder      Other Preventive Screenings Covered by Medicare:  11  Abdominal Aortic Aneurysm (AAA) Screening: covered once if your at risk  You're considered to be at risk if you have a family history of AAA or a male between the age of 73-68 who smoking at least 100 cigarettes in your lifetime  12  Lung Cancer Screening: covers low dose CT scan once per year if you meet all of the following conditions: (1) Age 50-69; (2) No signs or symptoms of lung cancer; (3) Current smoker or have quit smoking within the last 15 years; (4) You have a tobacco smoking history of at least 30 pack years (packs per day x number of years you smoked); (5) You get a written order from a healthcare provider  15  Glaucoma Screening: covered annually if you're considered high risk: (1) You have diabetes OR (2) Family history of glaucoma OR (3)  aged 48 and older OR (3)  American aged 72 and older  15  Osteoporosis Screening: covered every 2 years if you meet one of the following conditions: (1) Have a vertebral abnormality; (2) On glucocorticoid therapy for more than 3 months; (3) Have primary hyperparathyroidism; (4) On osteoporosis medications and need to assess response to drug therapy  15  HIV Screening: covered annually if you're between the age of 12-76  Also covered annually if you are younger than 13 and older than 72 with risk factors for HIV infection  For pregnant patients, it is covered up to 3 times per pregnancy      Immunizations:  Immunization Recommendations   Influenza Vaccine Annual influenza vaccination during flu season is recommended for all persons aged >= 6 months who do not have contraindications Pneumococcal Vaccine (Prevnar and Pneumovax)  * Prevnar = PCV13  * Pneumovax = PPSV23 Adults 25-60 years old: 1-3 doses may be recommended based on certain risk factors  Adults 72 years old: Prevnar (PCV13) vaccine recommended followed by Pneumovax (PPSV23) vaccine  If already received PPSV23 since turning 65, then PCV13 recommended at least one year after PPSV23 dose  Hepatitis B Vaccine 3 dose series if at intermediate or high risk (ex: diabetes, end stage renal disease, liver disease)   Tetanus (Td) Vaccine - COST NOT COVERED BY MEDICARE PART B Following completion of primary series, a booster dose should be given every 10 years to maintain immunity against tetanus  Td may also be given as tetanus wound prophylaxis  Tdap Vaccine - COST NOT COVERED BY MEDICARE PART B Recommended at least once for all adults  For pregnant patients, recommended with each pregnancy  Shingles Vaccine (Shingrix) - COST NOT COVERED BY MEDICARE PART B  2 shot series recommended in those aged 48 and above     Health Maintenance Due:      Topic Date Due    Colorectal Cancer Screening  Never done    HIV Screening  Completed    Hepatitis C Screening  Completed     Immunizations Due:      Topic Date Due    Pneumococcal Vaccine: 65+ Years (1 of 2 - PPSV23) 11/08/1961    COVID-19 Vaccine (3 - Booster for [place] Pacer series) 09/01/2021     Advance Directives   What are advance directives? Advance directives are legal documents that state your wishes and plans for medical care  These plans are made ahead of time in case you lose your ability to make decisions for yourself  Advance directives can apply to any medical decision, such as the treatments you want, and if you want to donate organs  What are the types of advance directives? There are many types of advance directives, and each state has rules about how to use them  You may choose a combination of any of the following:  · Living will:   This is a written record of the treatment you want  You can also choose which treatments you do not want, which to limit, and which to stop at a certain time  This includes surgery, medicine, IV fluid, and tube feedings  · Durable power of  for healthcare [facility]): This is a written record that states who you want to make healthcare choices for you when you are unable to make them for yourself  This person, called a proxy, is usually a family member or a friend  You may choose more than 1 proxy  · Do not resuscitate (DNR) order:  A DNR order is used in case your heart stops beating or you stop breathing  It is a request not to have certain forms of treatment, such as CPR  A DNR order may be included in other types of advance directives  · Medical directive: This covers the care that you want if you are in a coma, near death, or unable to make decisions for yourself  You can list the treatments you want for each condition  Treatment may include pain medicine, surgery, blood transfusions, dialysis, IV or tube feedings, and a ventilator (breathing machine)  · Values history: This document has questions about your views, beliefs, and how you feel and think about life  This information can help others choose the care that you would choose  Why are advance directives important? An advance directive helps you control your care  Although spoken wishes may be used, it is better to have your wishes written down  Spoken wishes can be misunderstood, or not followed  Treatments may be given even if you do not want them  An advance directive may make it easier for your family to make difficult choices about your care  How to Quit Using Smokeless Tobacco   Why it is important to stop using smokeless tobacco:  Smokeless tobacco comes in many forms  Examples include chew, snuff, dip, dissolvable tobacco, and snus  All smokeless tobacco products contain nicotine and may contain as much nicotine as 3 cigarettes   You may be physically dependent on nicotine  You may also be emotionally addicted to it  The cravings can be strong, but it is important to quit using smokeless tobacco  You will improve your health and decrease your cancer, stroke, and heart attack risk  Mouth sores and tooth problems will also improve when you quit  You can benefit from quitting no matter how long you have used smokeless tobacco    Prepare to stop using smokeless tobacco:  Nicotine is a highly addictive drug  Withdrawal symptoms can happen when you stop and make it hard to quit  The following can help keep you on track:  · Set a quit date  · Tell friends, family, and coworkers that you plan to quit  · Remove all smokeless tobacco products from your home, car, and workplace  Manage weight gain after you quit:  Nicotine can affect your metabolism  You may gain a few pounds after you quit  The following can help you control your weight:  · Eat healthy foods  · Drink water before, during, and between meals  · Exercise as directed  Weight Management   Why it is important to manage your weight:  Being overweight increases your risk of health conditions such as heart disease, high blood pressure, type 2 diabetes, and certain types of cancer  It can also increase your risk for osteoarthritis, sleep apnea, and other respiratory problems  Aim for a slow, steady weight loss  Even a small amount of weight loss can lower your risk of health problems  How to lose weight safely:  A safe and healthy way to lose weight is to eat fewer calories and get regular exercise  You can lose up about 1 pound a week by decreasing the number of calories you eat by 500 calories each day  Healthy meal plan for weight management:  A healthy meal plan includes a variety of foods, contains fewer calories, and helps you stay healthy  A healthy meal plan includes the following:  · Eat whole-grain foods more often  A healthy meal plan should contain fiber   Fiber is the part of grains, fruits, and vegetables that is not broken down by your body  Whole-grain foods are healthy and provide extra fiber in your diet  Some examples of whole-grain foods are whole-wheat breads and pastas, oatmeal, brown rice, and bulgur  · Eat a variety of vegetables every day  Include dark, leafy greens such as spinach, kale, james greens, and mustard greens  Eat yellow and orange vegetables such as carrots, sweet potatoes, and winter squash  · Eat a variety of fruits every day  Choose fresh or canned fruit (canned in its own juice or light syrup) instead of juice  Fruit juice has very little or no fiber  · Eat low-fat dairy foods  Drink fat-free (skim) milk or 1% milk  Eat fat-free yogurt and low-fat cottage cheese  Try low-fat cheeses such as mozzarella and other reduced-fat cheeses  · Choose meat and other protein foods that are low in fat  Choose beans or other legumes such as split peas or lentils  Choose fish, skinless poultry (chicken or turkey), or lean cuts of red meat (beef or pork)  Before you cook meat or poultry, cut off any visible fat  · Use less fat and oil  Try baking foods instead of frying them  Add less fat, such as margarine, sour cream, regular salad dressing and mayonnaise to foods  Eat fewer high-fat foods  Some examples of high-fat foods include french fries, doughnuts, ice cream, and cakes  · Eat fewer sweets  Limit foods and drinks that are high in sugar  This includes candy, cookies, regular soda, and sweetened drinks  Exercise:  Exercise at least 30 minutes per day on most days of the week  Some examples of exercise include walking, biking, dancing, and swimming  You can also fit in more physical activity by taking the stairs instead of the elevator or parking farther away from stores  Ask your healthcare provider about the best exercise plan for you        © Copyright [employer] 2018 Information is for End User's use only and may not be sold, redistributed or otherwise used for commercial purposes   All illustrations and images included in CareNotes® are the copyrighted property of A D A M [employer]  or [facility] Raya Cerrato

## 2022-04-13 NOTE — ASSESSMENT & PLAN NOTE
The patient presents today for a Welcome to Medicare visit  All components of the visit were addressed with the patient  Significant gaps in care were noted to be need for pneumococcal vaccination which was administered  He is overdue for colorectal cancer screening and request was made for this appointment  He does not need AAA or lung cancer screening  He is not a tobacco smoker  He has used oral nicotine products and he continues to do so  We counseled him as to the need to discontinue this  He had been drinking alcohol significantly due to the death of his wife from COVID-23  He had an emergency room visit about 3 weeks ago for alcohol withdrawal symptoms  He states that he has recovered from this and he has not had any alcohol to drink since that time  We encouraged him strongly to maintain sobriety  He has a family history of carcinoma in his father, he is unclear whether it is prostate or colon  We did order a GI referral for colonoscopy  Additionally will get a PSA today and follow up when the results are available  Anticipatory guidance was provided otherwise  He agrees with this plan

## 2022-04-13 NOTE — ASSESSMENT & PLAN NOTE
Continue with omeprazole  Attempts at weaning have been unsuccessful  He has no worrisome symptoms presently

## 2022-04-13 NOTE — ASSESSMENT & PLAN NOTE
He has a potential family history of prostate carcinoma though this is not clear  We are going to get a PSA today  He is not having significant symptoms of prostatism presently

## 2022-04-14 LAB
CHOLEST SERPL-MCNC: 251 MG/DL
CHOLEST/HDLC SERPL: 5 (CALC)
ERYTHROCYTE [DISTWIDTH] IN BLOOD BY AUTOMATED COUNT: 12.8 % (ref 11–15)
HCT VFR BLD AUTO: 48 % (ref 38.5–50)
HDLC SERPL-MCNC: 50 MG/DL
HGB BLD-MCNC: 15.9 G/DL (ref 13.2–17.1)
LDLC SERPL CALC-MCNC: 181 MG/DL (CALC)
MCH RBC QN AUTO: 31.1 PG (ref 27–33)
MCHC RBC AUTO-ENTMCNC: 33.1 G/DL (ref 32–36)
MCV RBC AUTO: 93.8 FL (ref 80–100)
NONHDLC SERPL-MCNC: 201 MG/DL (CALC)
PLATELET # BLD AUTO: 261 THOUSAND/UL (ref 140–400)
PMV BLD REES-ECKER: 11.8 FL (ref 7.5–12.5)
PSA SERPL-MCNC: 1.07 NG/ML
RBC # BLD AUTO: 5.12 MILLION/UL (ref 4.2–5.8)
TRIGL SERPL-MCNC: 86 MG/DL
WBC # BLD AUTO: 7.5 THOUSAND/UL (ref 3.8–10.8)

## 2022-04-21 DIAGNOSIS — I10 BENIGN ESSENTIAL HTN: Primary | ICD-10-CM

## 2022-04-22 LAB
ALBUMIN SERPL-MCNC: 4.4 G/DL (ref 3.6–5.1)
ALBUMIN/GLOB SERPL: 1.9 (CALC) (ref 1–2.5)
ALP SERPL-CCNC: 61 U/L (ref 35–144)
ALT SERPL-CCNC: 38 U/L (ref 9–46)
AST SERPL-CCNC: 28 U/L (ref 10–35)
BILIRUB SERPL-MCNC: 0.6 MG/DL (ref 0.2–1.2)
BUN SERPL-MCNC: 12 MG/DL (ref 7–25)
BUN/CREAT SERPL: NORMAL (CALC) (ref 6–22)
CALCIUM SERPL-MCNC: 9.5 MG/DL (ref 8.6–10.3)
CHLORIDE SERPL-SCNC: 103 MMOL/L (ref 98–110)
CO2 SERPL-SCNC: 23 MMOL/L (ref 20–32)
CREAT SERPL-MCNC: 1.06 MG/DL (ref 0.7–1.25)
GLOBULIN SER CALC-MCNC: 2.3 G/DL (CALC) (ref 1.9–3.7)
GLUCOSE SERPL-MCNC: 88 MG/DL (ref 65–99)
POTASSIUM SERPL-SCNC: 4.2 MMOL/L (ref 3.5–5.3)
PROT SERPL-MCNC: 6.7 G/DL (ref 6.1–8.1)
PSA SERPL-MCNC: 1.09 NG/ML
SL AMB EGFR AFRICAN AMERICAN: 85 ML/MIN/1.73M2
SL AMB EGFR NON AFRICAN AMERICAN: 73 ML/MIN/1.73M2
SODIUM SERPL-SCNC: 138 MMOL/L (ref 135–146)

## 2022-06-09 DIAGNOSIS — N52.9 ERECTILE DYSFUNCTION, UNSPECIFIED ERECTILE DYSFUNCTION TYPE: ICD-10-CM

## 2022-06-09 RX ORDER — SILDENAFIL 100 MG/1
100 TABLET, FILM COATED ORAL DAILY PRN
Qty: 10 TABLET | Refills: 0 | Status: SHIPPED | OUTPATIENT
Start: 2022-06-09 | End: 2022-06-09 | Stop reason: SDUPTHER

## 2022-06-09 RX ORDER — SILDENAFIL 100 MG/1
100 TABLET, FILM COATED ORAL DAILY PRN
Qty: 10 TABLET | Refills: 0 | Status: SHIPPED | OUTPATIENT
Start: 2022-06-09

## 2022-06-10 DIAGNOSIS — K21.9 GASTROESOPHAGEAL REFLUX DISEASE: ICD-10-CM

## 2022-06-10 DIAGNOSIS — M19.90 INFLAMMATORY ARTHRITIS: ICD-10-CM

## 2022-06-10 RX ORDER — OMEPRAZOLE 40 MG/1
CAPSULE, DELAYED RELEASE ORAL
Qty: 90 CAPSULE | Refills: 0 | Status: SHIPPED | OUTPATIENT
Start: 2022-06-10

## 2022-06-10 RX ORDER — SULFASALAZINE 500 MG/1
TABLET ORAL
Qty: 180 TABLET | Refills: 0 | Status: SHIPPED | OUTPATIENT
Start: 2022-06-10

## 2022-07-04 NOTE — PROGRESS NOTES
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, who presents for a follow-up  [facility] is currently on sulfasalazine 500 mg twice daily        - Wojciech presents for a follow-up of peripheral spondyloarthritis primarily affecting his left knee which has been well controlled with the use of sulfasalazine 500 mg twice daily  Karl Marielos will continue with this medication with the dose unchanged  [facility] was unable to tolerate higher doses due to diarrhea   He is due for high risk medication lab monitoring every 4-6 months      - In regards to the chronic bilateral hand and foot pain there does not appear to be a clear etiology for the symptoms but they do not seem inflammatory in nature  The hand and foot x-rays were normal and there is no synovitis noted on his examination  Overall his symptoms are stable at this time but I requested he contact me if his symptoms flare up at which time we can trial an alternate NSAID as he does not have any absolute contraindications      - As he has overall been stable I will see him back for a follow-up appointment in 1 year but requested he contact me in the interim with any concerns  Plan:  Diagnoses and all orders for this visit:    Peripheral spondyloarthritis    Encounter for monitoring sulfasalazine therapy  -     CBC and differential; Standing  -     Comprehensive metabolic panel; Standing  -     CBC and differential  -     Comprehensive metabolic panel    Other orders  -     DULoxetine (CYMBALTA) 30 mg delayed release capsule  -     DULoxetine (CYMBALTA) 60 mg delayed release capsule      Activities as tolerated  Exercise: try to maintain a low impact exercise regimen as much as possible  Walk for 30 minutes a day for at least 3 days a week  Continue other medications as prescribed by PCP and other specialists  RTC in 1 year  HPI      Rheumatic Disease Summary  1  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   -Rheum consult 10/3/19 for left knee pain/swelling; previously saw Dr Wilder Cornell around 2014 for 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 and tried plaquenil but stopped due to diarrhea; improvement with diclofenac and prednisone courses   -Labs 2015: ESTEFANÍA 40, negative dsDNA, prabhakar, RNP, SSA, SSB,   -Presented on pred 15mg and meloxicam   -Synovial fluid left knee 9/10/19: WBC 5,305, 26% neutrophils, negative crystals and Lyme PCR   -Labs 8/2019: negative RF, lyme, hep c   -MRI left knee 9/25/19: small joint effusion, no mention of synovitis, MCL sprain, no internal derangement noted   -XR hands/feet/SI joints 10/3/19: OA, no erosive inflammatory changes   -Labs 10/4/19: negative ESTEFANÍA, CCP, SSA, SSB, HLA-B27, RPR, GC/chlamydia, TB, HIV, hep panel, uric acid 6 7, CRP 5 7, ESR 31, vit D 18  -Initial visit: no IA on exam on pred 15mg, advised to wean to 10mg  -Visit 12/16/19: attempted left knee aspiration with minimal bloody fluid, injected kenalog, wean pred to 7 5mg then 5mg if able; no signs of a systemic IA   -Visit 1/15/20: improved temporarily with injection, pred down to 5mg but recurrent swelling/warmth; started sulfasalazine for possible inflammatory monoarthritis with goal of weaning off prednisone  -Visit 3/25/20 (telemedicine): doing better on SSZ just 500mg daily, try going to BID again and then D/C pred 2 5mg if able  -Visit 6/30/20:  mg BID, off prednisone  Reports LE edema - follow up with PCP   - 3/9/21: continue SSZ   - 7/6/22: continue SSZ    2  Comorbidities: GERD         Subjective (INITIAL VISIT NOTE WITH ME IN 6/2020):  Mr Clement Ragland  is a 60-year-old  male with history significant for a 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, who presents for a follow-up  Mojgan Mejia is currently on sulfasalazine 500 mg twice daily  Mojgan Mejia is a patient of Dr Christiana Giles was last seen for a visit on 03/25/2020  Celia Méndez did review his recently done CBC, CMP and ESR which were unremarkable   His CRP was elevated at 12       He reports following his last visit he was able to discontinue the prednisone without a flare-up of symptoms  [facility] has been feeling well on the sulfasalazine 500 mg twice daily and states that he is tolerating the medication well and also controlling his symptoms of the left knee pain and swelling   Over the past 1 month he has noticed swelling affecting his bilateral ankles and feet, which he feels is creeping up his legs   He reports a sensation of fullness and throbbing, but denies any specific joint pains through his ankles   He reports chronic pain at the tips of his fingers and toes, but denies any other joint pains or swelling   He reports diffuse morning stiffness which improves quickly upon activities  [facility] is not taking any over-the-counter pain medications      He reports undergoing cardiac workup in 2016, with an echocardiogram showing grade 1 diastolic dysfunction         10/6/2020:  Patient presents for a follow-up of peripheral spondyloarthritis  He is currently on sulfasalazine 500 mg twice daily  I reviewed his labs done following the last office visit which showed an unremarkable CBC and CMP     He has overall done well on the sulfasalazine and states that the left knee has been stable  He has not experienced any significant recurrent pain or swelling  He has noticed mild pain in his left hip region as well as occasionally in his fingers and toes, but denies any other significant joint pains  He is continuing to note swelling in his lower legs but states over the past few weeks this has slightly improved  He was seen by his primary care physician and had a chest x-ray, echocardiogram and vascular studies of his legs done which were mostly unrevealing except for grade 1 diastolic dysfunction on the echo  He is scheduled to see Cardiology next week  No other joint swelling    He experiences a sensation of diffuse morning stiffness which improves quickly with activities  He is not taking any over-the-counter pain medications      He has been tolerating the sulfasalazine well without any concerns for side effects or infections  3/9/2021:  Patient presents for a follow-up of peripheral spondyloarthritis  He is currently on sulfasalazine 500 mg twice daily  He is due for labs  He reports in terms of the spondyloarthritis symptoms have been stable and he has not had any flare-ups of the knee pain or swelling  He does report mild activity related knee and ankle pain, but this is minor  He reports constant pain in his hands and feet which have been occurring for the past 5 years  This usually flares up with the cold weather  He has tried multiple over-the-counter pain medications and even steroids without any relief  No other joint pains  No joint swelling or morning stiffness  He has been tolerating the sulfasalazine well without any concerns for side effects or infections  7/6/2022:  Patient presents for a follow-up of peripheral spondyloarthritis  He is currently on sulfasalazine 500 mg twice daily  I reviewed his labs from April which showed an unremarkable CBC and CMP  He reports overall he has been doing well without any flare ups in joint pains, swelling or stiffness  He is not taking any over-the-counter pain medications at this time  We held off on prescribing the NSAIDs after the last office visit due to a mild decline in his kidney function and elevated liver enzymes  This has since improved  He has also been focusing on a healthy lifestyle with exercising and dietary changes which has resulted in weight loss and he reports overall feeling better  He reports during the winter his joint pains generally flare and he will contact me at that time if pain medications are required        He has been tolerating the sulfasalazine well without any concerns for side effects or infections  The following portions of the patient's history were reviewed and updated as appropriate: allergies, current medications, past family history, past medical history, past social history, past surgical history and problem list       Review of Systems  Constitutional: Negative for weight change, fevers, chills, night sweats, fatigue  ENT/Mouth: Negative for hearing changes, ear pain, nasal congestion, sinus pain, hoarseness, sore throat, rhinorrhea, swallowing difficulty  Eyes: Negative for pain, redness, discharge, vision changes  Cardiovascular: Negative for chest pain, SOB, palpitations  Respiratory: Negative for cough, sputum, wheezing, dyspnea  Gastrointestinal: Negative for nausea, vomiting, diarrhea, constipation, pain, heartburn  Genitourinary: Negative for dysuria, urinary frequency, hematuria  Musculoskeletal: As per HPI  Skin: Negative for skin rash, color changes  Neuro: Negative for weakness, numbness, tingling, loss of consciousness  Psych: Negative for anxiety, depression  Heme/Lymph: Negative for easy bruising, bleeding, lymphadenopathy  Past Medical History:   Diagnosis Date    Abnormal ECG     Last Assessed 2/29/2016    Acute maxillary sinusitis 11/22/2019    Acute non-recurrent maxillary sinusitis 11/22/2019    Acute viral syndrome 2/7/2020    Calf swelling 9/26/2019    CAP (community acquired pneumonia) 10/12/2016    COVID-19 8/11/2021    Lyme disease 6/21/2021    Weight loss 2/11/2019       Past Surgical History:   Procedure Laterality Date    INGUINAL HERNIA REPAIR         Social History     Socioeconomic History    Marital status:       Spouse name: Not on file    Number of children: Not on file    Years of education: Not on file    Highest education level: Not on file   Occupational History    Not on file   Tobacco Use    Smoking status: Former Smoker    Smokeless tobacco: Current User     Types: Chew   Vaping Use    Vaping Use: Never used   Substance and Sexual Activity    Alcohol use: Yes     Comment: social    Drug use: No    Sexual activity: Yes     Partners: Female     Comment: Wife   Other Topics Concern    Not on file   Social History Narrative    Not on file     Social Determinants of Health     Financial Resource Strain: Not on file   Food Insecurity: Not on file   Transportation Needs: Not on file   Physical Activity: Not on file   Stress: Not on file   Social Connections: Not on file   Intimate Partner Violence: Not on file   Housing Stability: Not on file       Family History   Problem Relation Age of Onset    Cancer Family     Colon cancer Father     COPD Father        No Known Allergies      Current Outpatient Medications:     sildenafil (VIAGRA) 100 mg tablet, Take 1 tablet (100 mg total) by mouth daily as needed for erectile dysfunction, Disp: 10 tablet, Rfl: 0    DULoxetine (CYMBALTA) 30 mg delayed release capsule, , Disp: , Rfl:     DULoxetine (CYMBALTA) 60 mg delayed release capsule, , Disp: , Rfl:     omeprazole (PriLOSEC) 40 MG capsule, TAKE 1 CAPSULE BY MOUTH EVERY DAY, Disp: 90 capsule, Rfl: 0    sulfaSALAzine (AZULFIDINE) 500 mg tablet, TAKE 1 TABLET BY MOUTH TWICE A DAY, Disp: 180 tablet, Rfl: 0      Objective:    Vitals:    07/06/22 1551   BP: 142/88   Pulse: 88       Physical Exam  General: Well appearing, well nourished, in no distress  Oriented x 3, normal mood and affect  Ambulating without difficulty  Skin: Good turgor, no rash, unusual bruising or prominent lesions  Hair: Normal texture and distribution  Nails: Normal color, no deformities  HEENT:  Head: Normocephalic, atraumatic  Eyes: Conjunctiva clear, sclera non-icteric, EOM intact  Extremities: No amputations or deformities, cyanosis, edema  Musculoskeletal:   No peripheral joint soft tissue swelling or tenderness noted  Neurologic: Alert and oriented  No focal neurological deficits appreciated     Psychiatric: Normal mood and BECCA Thomas    Rheumatology

## 2022-07-06 ENCOUNTER — OFFICE VISIT (OUTPATIENT)
Dept: RHEUMATOLOGY | Facility: CLINIC | Age: 67
End: 2022-07-06
Payer: MEDICARE

## 2022-07-06 VITALS — HEART RATE: 88 BPM | SYSTOLIC BLOOD PRESSURE: 142 MMHG | DIASTOLIC BLOOD PRESSURE: 88 MMHG

## 2022-07-06 DIAGNOSIS — M47.819 PERIPHERAL SPONDYLOARTHRITIS: Primary | ICD-10-CM

## 2022-07-06 DIAGNOSIS — Z51.81 ENCOUNTER FOR MONITORING SULFASALAZINE THERAPY: ICD-10-CM

## 2022-07-06 DIAGNOSIS — Z79.899 ENCOUNTER FOR MONITORING SULFASALAZINE THERAPY: ICD-10-CM

## 2022-07-06 PROCEDURE — 99215 OFFICE O/P EST HI 40 MIN: CPT | Performed by: INTERNAL MEDICINE

## 2022-07-06 RX ORDER — DULOXETIN HYDROCHLORIDE 60 MG/1
CAPSULE, DELAYED RELEASE ORAL
COMMUNITY
Start: 2022-04-13

## 2022-07-06 RX ORDER — DULOXETIN HYDROCHLORIDE 30 MG/1
CAPSULE, DELAYED RELEASE ORAL
COMMUNITY
Start: 2022-04-14

## 2022-08-16 DIAGNOSIS — N52.9 ERECTILE DYSFUNCTION, UNSPECIFIED ERECTILE DYSFUNCTION TYPE: ICD-10-CM

## 2022-08-16 RX ORDER — SILDENAFIL 100 MG/1
100 TABLET, FILM COATED ORAL DAILY PRN
Qty: 10 TABLET | Refills: 0 | Status: SHIPPED | OUTPATIENT
Start: 2022-08-16

## 2022-08-16 RX ORDER — SILDENAFIL 100 MG/1
100 TABLET, FILM COATED ORAL DAILY PRN
Qty: 10 TABLET | Refills: 0 | Status: SHIPPED | OUTPATIENT
Start: 2022-08-16 | End: 2022-08-16 | Stop reason: SDUPTHER

## 2022-09-04 DIAGNOSIS — K21.9 GASTROESOPHAGEAL REFLUX DISEASE: ICD-10-CM

## 2022-09-04 DIAGNOSIS — M19.90 INFLAMMATORY ARTHRITIS: ICD-10-CM

## 2022-09-04 RX ORDER — SULFASALAZINE 500 MG/1
TABLET ORAL
Qty: 180 TABLET | Refills: 0 | Status: SHIPPED | OUTPATIENT
Start: 2022-09-04

## 2022-09-06 RX ORDER — OMEPRAZOLE 40 MG/1
CAPSULE, DELAYED RELEASE ORAL
Qty: 90 CAPSULE | Refills: 0 | Status: SHIPPED | OUTPATIENT
Start: 2022-09-06

## 2022-10-11 PROBLEM — Z00.00 WELCOME TO MEDICARE PREVENTIVE VISIT: Status: RESOLVED | Noted: 2022-04-13 | Resolved: 2022-10-11

## 2022-12-03 DIAGNOSIS — M19.90 INFLAMMATORY ARTHRITIS: ICD-10-CM

## 2022-12-03 RX ORDER — SULFASALAZINE 500 MG/1
TABLET ORAL
Qty: 180 TABLET | Refills: 0 | Status: SHIPPED | OUTPATIENT
Start: 2022-12-03

## 2023-01-24 ENCOUNTER — OFFICE VISIT (OUTPATIENT)
Dept: FAMILY MEDICINE CLINIC | Facility: CLINIC | Age: 68
End: 2023-01-24

## 2023-01-24 VITALS
TEMPERATURE: 98 F | SYSTOLIC BLOOD PRESSURE: 110 MMHG | BODY MASS INDEX: 31.08 KG/M2 | RESPIRATION RATE: 16 BRPM | WEIGHT: 198 LBS | HEART RATE: 91 BPM | DIASTOLIC BLOOD PRESSURE: 80 MMHG | OXYGEN SATURATION: 99 % | HEIGHT: 67 IN

## 2023-01-24 DIAGNOSIS — R21 RASH: Primary | ICD-10-CM

## 2023-01-24 DIAGNOSIS — N52.9 ERECTILE DYSFUNCTION, UNSPECIFIED ERECTILE DYSFUNCTION TYPE: ICD-10-CM

## 2023-01-24 DIAGNOSIS — Z12.11 ENCOUNTER FOR SCREENING FOR MALIGNANT NEOPLASM OF COLON: ICD-10-CM

## 2023-01-24 RX ORDER — PREDNISONE 20 MG/1
TABLET ORAL
Qty: 15 TABLET | Refills: 0 | Status: SHIPPED | OUTPATIENT
Start: 2023-01-24 | End: 2023-02-05

## 2023-01-24 RX ORDER — SILDENAFIL 100 MG/1
100 TABLET, FILM COATED ORAL DAILY PRN
Qty: 30 TABLET | Refills: 0 | Status: SHIPPED | OUTPATIENT
Start: 2023-01-24

## 2023-01-24 RX ORDER — SEMAGLUTIDE 2.68 MG/ML
INJECTION, SOLUTION SUBCUTANEOUS
COMMUNITY
Start: 2023-01-05 | End: 2023-01-24 | Stop reason: CLARIF

## 2023-01-24 RX ORDER — LABETALOL 200 MG/1
200 TABLET, FILM COATED ORAL 2 TIMES DAILY
COMMUNITY
Start: 2022-11-23 | End: 2023-01-24 | Stop reason: CLARIF

## 2023-01-24 RX ORDER — EMPAGLIFLOZIN 25 MG/1
25 TABLET, FILM COATED ORAL DAILY
COMMUNITY
Start: 2022-11-23 | End: 2023-01-24 | Stop reason: CLARIF

## 2023-01-24 RX ORDER — CLONIDINE HYDROCHLORIDE 0.2 MG/1
0.2 TABLET ORAL 2 TIMES DAILY
COMMUNITY
Start: 2022-10-26

## 2023-01-24 RX ORDER — METOPROLOL SUCCINATE 100 MG/1
100 TABLET, EXTENDED RELEASE ORAL 2 TIMES DAILY
COMMUNITY
Start: 2022-10-27 | End: 2023-01-24 | Stop reason: CLARIF

## 2023-01-24 RX ORDER — AMLODIPINE BESYLATE 10 MG/1
TABLET ORAL
COMMUNITY
Start: 2022-11-14 | End: 2023-01-24 | Stop reason: CLARIF

## 2023-01-24 RX ORDER — ATORVASTATIN CALCIUM 20 MG/1
TABLET, FILM COATED ORAL
COMMUNITY
Start: 2022-11-17 | End: 2023-01-24 | Stop reason: CLARIF

## 2023-01-24 RX ORDER — VALSARTAN AND HYDROCHLOROTHIAZIDE 320; 25 MG/1; MG/1
TABLET, FILM COATED ORAL
COMMUNITY
Start: 2022-10-27 | End: 2023-01-24 | Stop reason: CLARIF

## 2023-01-24 NOTE — ASSESSMENT & PLAN NOTE
Appears to be contact dermatitis and somewhat improved with topical cortisone  Due to widespread nature of rash recommend oral steroid taper  If not improved would try antifungal treatment based on location of rash  Patient advised to call if not improving in 3-5 days or if worsening

## 2023-01-24 NOTE — PROGRESS NOTES
Name: Lyndsey Rodriguez      : 1955      MRN: [id number]  Encounter Provider: Blaine Rao DO  Encounter Date: 2023   Encounter department: [address] [place] [address]      1  Rash  Assessment & Plan:  Appears to be contact dermatitis and somewhat improved with topical cortisone  Due to widespread nature of rash recommend oral steroid taper  If not improved would try antifungal treatment based on location of rash  Patient advised to call if not improving in 3-5 days or if worsening  Orders:  -     predniSONE 20 mg tablet; Take 2 tablets (40 mg total) by mouth daily for 3 days, THEN 1 5 tablets (30 mg total) daily for 3 days, THEN 1 tablet (20 mg total) daily for 3 days, THEN 0 5 tablets (10 mg total) daily for 3 days  2  Erectile dysfunction, unspecified erectile dysfunction type  -     sildenafil (VIAGRA) 100 mg tablet; Take 1 tablet (100 mg total) by mouth daily as needed for erectile dysfunction    3  Encounter for screening for malignant neoplasm of colon  -     Ambulatory referral for colonoscopy; Future         Subjective      HPI   Patient presents for evaluation of rash x6 weeks that has popped up in different locations over this period  He has used gold bond which provides minimal relief  Cortisone OTC cream provides relief for a short period but symptoms recur  Rash is widespread, pruritic  Review of Systems   Constitutional: Negative for chills and fever  Skin: Positive for rash         Current Outpatient Medications on File Prior to Visit   Medication Sig   • sulfaSALAzine (AZULFIDINE) 500 mg tablet TAKE 1 TABLET BY MOUTH TWICE A DAY   • [DISCONTINUED] sildenafil (VIAGRA) 100 mg tablet Take 1 tablet (100 mg total) by mouth daily as needed for erectile dysfunction   • cloNIDine (CATAPRES) 0 2 mg tablet Take 0 2 mg by mouth 2 (two) times a day (Patient not taking: Reported on 2023)   • [DISCONTINUED] amLODIPine (NORVASC) 10 mg tablet  (Patient not taking: Reported on 1/24/2023)   • [DISCONTINUED] atorvastatin (LIPITOR) 20 mg tablet  (Patient not taking: Reported on 1/24/2023)   • [DISCONTINUED] DULoxetine (CYMBALTA) 30 mg delayed release capsule  (Patient not taking: Reported on 1/24/2023)   • [DISCONTINUED] DULoxetine (CYMBALTA) 60 mg delayed release capsule  (Patient not taking: Reported on 1/24/2023)   • [DISCONTINUED] Jardiance 25 MG TABS Take 25 mg by mouth daily (Patient not taking: Reported on 1/24/2023)   • [DISCONTINUED] labetalol (NORMODYNE) 200 mg tablet Take 200 mg by mouth 2 (two) times a day (Patient not taking: Reported on 1/24/2023)   • [DISCONTINUED] metFORMIN (GLUCOPHAGE) 1000 MG tablet  (Patient not taking: Reported on 1/24/2023)   • [DISCONTINUED] metoprolol succinate (TOPROL-XL) 100 mg 24 hr tablet Take 100 mg by mouth 2 (two) times a day (Patient not taking: Reported on 1/24/2023)   • [DISCONTINUED] omeprazole (PriLOSEC) 40 MG capsule TAKE 1 CAPSULE BY MOUTH EVERY DAY (Patient not taking: Reported on 1/24/2023)   • [DISCONTINUED] Ozempic, 2 MG/DOSE, 8 MG/3ML injection pen  (Patient not taking: Reported on 1/24/2023)   • [DISCONTINUED] valsartan-hydrochlorothiazide (DIOVAN-HCT) 320-25 MG per tablet  (Patient not taking: Reported on 1/24/2023)       Objective     /80   Pulse 91   Temp 98 °F (36 7 °C) (Temporal)   Resp 16   Ht 5' 7" (1 702 m)   Wt 89 8 kg (198 lb)   SpO2 99%   BMI 31 01 kg/m²     Physical Exam  Vitals reviewed  Skin:     General: Skin is warm and dry  Findings: Rash (erythematous and scaling plaques on the extensor surfaces of elbows, axillae, waist, and groin with surrounding excoriations ) present         Rondell Paget, DO

## 2023-03-06 DIAGNOSIS — M19.90 INFLAMMATORY ARTHRITIS: ICD-10-CM

## 2023-03-06 RX ORDER — SULFASALAZINE 500 MG/1
TABLET ORAL
Qty: 180 TABLET | Refills: 0 | Status: SHIPPED | OUTPATIENT
Start: 2023-03-06

## 2023-07-07 ENCOUNTER — OFFICE VISIT (OUTPATIENT)
Dept: FAMILY MEDICINE CLINIC | Facility: CLINIC | Age: 68
End: 2023-07-07
Payer: MEDICARE

## 2023-07-07 VITALS
OXYGEN SATURATION: 97 % | HEIGHT: 67 IN | BODY MASS INDEX: 34.41 KG/M2 | RESPIRATION RATE: 18 BRPM | SYSTOLIC BLOOD PRESSURE: 140 MMHG | TEMPERATURE: 98 F | HEART RATE: 100 BPM | DIASTOLIC BLOOD PRESSURE: 80 MMHG | WEIGHT: 219.25 LBS

## 2023-07-07 DIAGNOSIS — L08.9 SKIN INFECTION: Primary | ICD-10-CM

## 2023-07-07 DIAGNOSIS — L30.4 INTERTRIGO: ICD-10-CM

## 2023-07-07 PROCEDURE — 99213 OFFICE O/P EST LOW 20 MIN: CPT | Performed by: NURSE PRACTITIONER

## 2023-07-07 RX ORDER — CEPHALEXIN 500 MG/1
500 CAPSULE ORAL EVERY 6 HOURS SCHEDULED
Qty: 28 CAPSULE | Refills: 0 | Status: SHIPPED | OUTPATIENT
Start: 2023-07-07 | End: 2023-07-14

## 2023-07-07 RX ORDER — CLOTRIMAZOLE 1 %
CREAM (GRAM) TOPICAL 2 TIMES DAILY
Qty: 30 G | Refills: 0 | Status: SHIPPED | OUTPATIENT
Start: 2023-07-07

## 2023-07-07 NOTE — PROGRESS NOTES
FAMILY PRACTICE OFFICE VISIT       NAME: Anthony Sutton  AGE: 78 y.o. SEX: male       : 1955        MRN: [id number]    Assessment and Plan   1. Skin infection  Unclear etiology of rash, does not appear fungal/yeast like, does not appear allergic. ? Infectious rash. Will start Keflex and have him return next week for recheck. -     cephalexin (KEFLEX) 500 mg capsule; Take 1 capsule (500 mg total) by mouth every 6 (six) hours for 7 days    2. Intertrigo  Rash under bilateral axilla is a new and different appearing rash. Start clotrimazole cream. Follow up next week. -     clotrimazole (LOTRIMIN) 1 % cream; Apply topically 2 (two) times a day             Chief Complaint     Chief Complaint   Patient presents with   • Rash     Lowe abd , arms shoulder, very  itchy x getting worse x 2 week        History of Present Illness     Anthony Sutton is a 78year old male presenting today for rash. Rash started in January. He was treated with prednisone for rash, it improved but did not resolve. It has been waxing and waning since. Over the last one week has gotten worse. It is located buttocks, groin, bilateral scapular areas, extensor elbows and knees. Occasionally and mildly on dorsal ring fingers bilaterally. Started elbows, and knees, but spreading. Itches. Gets painful after scratching. Sometimes has blisters. Itching all the time, not more so at night. Lives with daughter and 2 children. They do not have rash. No travel. No hotel stays since last summer. Did not change any skin products, detergents. Has used cortisone cream and gold bond. They do help with itching for anywhere between 1-8 hours. Feels well. No headaches, fever, sweats, night sweats, joint pain. Has chronic allergies with post nasal drip. Review of Systems   Review of Systems   Constitutional: Negative. HENT: Positive for postnasal drip. Gastrointestinal: Negative for nausea and vomiting. Musculoskeletal: Negative. Skin: Negative. Neurological: Negative for dizziness and headaches. I have reviewed the patient's medical history in detail; there are no changes to the history as noted in the electronic medical record. Objective     Vitals:    07/07/23 1110 07/07/23 1137   BP: 163/79 140/80   Pulse: 100    Resp: 18    Temp: 98 °F (36.7 °C)    SpO2: 97%    Weight: 99.5 kg (219 lb 4 oz)    Height: 5' 7" (1.702 m)      Wt Readings from Last 3 Encounters:   07/07/23 99.5 kg (219 lb 4 oz)   01/24/23 89.8 kg (198 lb)   04/13/22 92.1 kg (203 lb)     Physical Exam  Vitals and nursing note reviewed. Constitutional:       Appearance: Normal appearance. He is not ill-appearing. Cardiovascular:      Rate and Rhythm: Normal rate and regular rhythm. Heart sounds: No murmur heard. Pulmonary:      Effort: Pulmonary effort is normal. No respiratory distress. Breath sounds: Normal breath sounds. Skin:     Comments: Inflamed red rash,  scabbed, crusted papules, symmetrical on buttocks, groin, extensor elbows, extensor knees, bilateral scapular areas. Rash is confluent, most inflamed on buttocks and groin. Different appearing rash under bilateral axilla, just appeared over last few days, flat round scaling rash consistent with intertrigo. Neurological:      Mental Status: He is alert.    Psychiatric:         Mood and Affect: Mood normal.            ALLERGIES:  No Known Allergies    Current Medications     Current Outpatient Medications   Medication Sig Dispense Refill   • cephalexin (KEFLEX) 500 mg capsule Take 1 capsule (500 mg total) by mouth every 6 (six) hours for 7 days 28 capsule 0   • clotrimazole (LOTRIMIN) 1 % cream Apply topically 2 (two) times a day 30 g 0   • sildenafil (VIAGRA) 100 mg tablet Take 1 tablet (100 mg total) by mouth daily as needed for erectile dysfunction 30 tablet 0   • sulfaSALAzine (AZULFIDINE) 500 mg tablet TAKE 1 TABLET BY MOUTH TWICE A  tablet 0     No current facility-administered medications for this visit.          Health Maintenance     Health Maintenance   Topic Date Due   • Annual Physical  Never done   • Colorectal Cancer Screening  Never done   • BMI: Followup Plan  04/13/2023   • Influenza Vaccine (1) 09/01/2023   • COVID-19 Vaccine (6 - Moderna series) 07/17/2023   • Fall Risk  07/07/2024   • Depression Screening  07/07/2024   • BMI: Adult  07/07/2024   • DTaP,Tdap,and Td Vaccines (2 - Td or Tdap) 02/19/2025   • Hepatitis C Screening  Completed   • Pneumococcal Vaccine: 65+ Years  Completed   • HIB Vaccine  Aged Out   • IPV Vaccine  Aged Out   • Hepatitis A Vaccine  Aged Out   • Meningococcal ACWY Vaccine  Aged Out   • HPV Vaccine  Aged Out     Immunization History   Administered Date(s) Administered   • COVID-19 MODERNA VACC 0.5 ML IM 03/04/2021, 04/01/2021, 11/24/2021, 08/05/2022   • COVID-19 Moderna Vac BIVALENT 12 Yr+ IM (BOOSTER ONLY) 0.5 ML 03/17/2023   • INFLUENZA 11/18/2013, 11/30/2016, 10/19/2021, 03/17/2023   • Influenza, injectable, quadrivalent, preservative free 0.5 mL 03/05/2020   • Pneumococcal Conjugate Vaccine 20-valent (Pcv20), Polysace 04/13/2022   • Tdap 02/19/2015   • Zoster Vaccine Recombinant 03/27/2019, 07/26/2019       SONJA Salinas

## 2023-07-11 NOTE — PROGRESS NOTES
Assessment and Plan:   Mr. 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 Baljit Cortes is currently on sulfasalazine 500 mg twice daily.       # Peripheral spondyloarthritis/?psoriatic arthritis  - Wojciech presents for a follow-up of peripheral spondyloarthritis primarily affecting his left knee which has been well controlled with the use of sulfasalazine 500 mg twice daily.  We will continue with this medication with the dose unchanged. Baljit Cortes was unable to tolerate higher doses due to diarrhea.  He is due for high risk medication lab monitoring again in 4-6 months.      # Skin rash/?psoriasis  - Since December 2022 he has been experiencing new symptoms of a widespread papular, erythematous skin rash which is steroid responsive. Based on the distribution of his symptoms such as affecting his scalp, gluteal region, extensor aspect of the arms and knees, also with the history of a peripheral spondyloarthritis I question if we may be dealing with psoriasis and related history of psoriatic arthritis affecting his left knee. I recommend a dermatology referral to assist with a diagnosis of the skin rash following which we can determine the most appropriate treatment for him. For now he will continue the sulfasalazine unchanged. I suspect less likely he is experiencing a side effect to the sulfasalazine given the chronicity of use. Plan:  Diagnoses and all orders for this visit:    Peripheral spondyloarthritis  Comments:  Psoriatic arthritis  Orders:  -     C-reactive protein; Future  -     Sedimentation rate, automated; Future  -     sulfaSALAzine (AZULFIDINE) 500 mg tablet; Take 1 tablet (500 mg total) by mouth 2 (two) times a day    Encounter for monitoring sulfasalazine therapy  -     CBC and differential; Future  -     Comprehensive metabolic panel; Future    Skin rash  Comments:  ? PsO  Orders:  -     Ambulatory Referral to Dermatology; Future      Activities as tolerated. Exercise: try to maintain a low impact exercise regimen as much as possible. Continue other medications as prescribed by PCP and other specialists. RTC in 1 year. HPI      Rheumatic Disease Summary  1. 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   -Rheum consult 10/3/19 for left knee pain/swelling; previously saw Dr. 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 and tried plaquenil but stopped due to diarrhea; improvement with diclofenac and prednisone courses   -Labs 2015: ESTEFANÍA 40, negative dsDNA, prabhakar, RNP, SSA, SSB,   -Presented on pred 15mg and meloxicam   -Synovial fluid left knee 9/10/19: WBC 5,305, 26% neutrophils, negative crystals and Lyme PCR   -Labs 8/2019: negative RF, lyme, hep c   -MRI left knee 9/25/19: small joint effusion, no mention of synovitis, MCL sprain, no internal derangement noted   -XR hands/feet/SI joints 10/3/19: OA, no erosive inflammatory changes   -Labs 10/4/19: negative ESTEFANÍA, CCP, SSA, SSB, HLA-B27, RPR, GC/chlamydia, TB, HIV, hep panel, uric acid 6.7, CRP 5.7, ESR 31, vit D 18  -Initial visit: no IA on exam on pred 15mg, advised to wean to 10mg  -Visit 12/16/19: attempted left knee aspiration with minimal bloody fluid, injected kenalog, wean pred to 7.5mg then 5mg if able; no signs of a systemic IA   -Visit 1/15/20: improved temporarily with injection, pred down to 5mg but recurrent swelling/warmth; started sulfasalazine for possible inflammatory monoarthritis with goal of weaning off prednisone  -Visit 3/25/20 (telemedicine): doing better on SSZ just 500mg daily, try going to BID again and then D/C pred 2.5mg if able  -Visit 6/30/20:  mg BID, off prednisone. Reports LE edema - follow up with PCP.  - 3/9/21: continue SSZ.  - 7/6/22: continue SSZ.  - 7/12/23: continue SSZ, new symptoms of rash - psoriasis? 2.  Comorbidities: GERD         Subjective (INITIAL VISIT NOTE WITH ME IN 6/2020):  Mr. 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 [facility] is currently on sulfasalazine 500 mg twice daily. [facility] is a patient of Dr. Marino Gut was last seen for a visit on 03/25/2020. Jeanette Muñiz did review his recently done CBC, CMP and ESR which were unremarkable.  His CRP was elevated at 12.      He reports following his last visit he was able to discontinue the prednisone without a flare-up of symptoms. [facility] has been feeling well on the sulfasalazine 500 mg twice daily and states that he is tolerating the medication well and also controlling his symptoms of the left knee pain and swelling.  Over the past 1 month he has noticed swelling affecting his bilateral ankles and feet, which he feels is creeping up his legs.  He reports a sensation of fullness and throbbing, but denies any specific joint pains through his ankles.  He reports chronic pain at the tips of his fingers and toes, but denies any other joint pains or swelling.  He reports diffuse morning stiffness which improves quickly upon activities. [facility] is not taking any over-the-counter pain medications.     He reports undergoing cardiac workup in 2016, with an echocardiogram showing grade 1 diastolic dysfunction.        10/6/2020:  Patient presents for a follow-up of peripheral spondyloarthritis. He is currently on sulfasalazine 500 mg twice daily. I reviewed his labs done following the last office visit which showed an unremarkable CBC and CMP.    He has overall done well on the sulfasalazine and states that the left knee has been stable. He has not experienced any significant recurrent pain or swelling. He has noticed mild pain in his left hip region as well as occasionally in his fingers and toes, but denies any other significant joint pains.   He is continuing to note swelling in his lower legs but states over the past few weeks this has slightly improved. He was seen by his primary care physician and had a chest x-ray, echocardiogram and vascular studies of his legs done which were mostly unrevealing except for grade 1 diastolic dysfunction on the echo. He is scheduled to see Cardiology next week. No other joint swelling. He experiences a sensation of diffuse morning stiffness which improves quickly with activities. He is not taking any over-the-counter pain medications.     He has been tolerating the sulfasalazine well without any concerns for side effects or infections. 3/9/2021:  Patient presents for a follow-up of peripheral spondyloarthritis. He is currently on sulfasalazine 500 mg twice daily. He is due for labs. He reports in terms of the spondyloarthritis symptoms have been stable and he has not had any flare-ups of the knee pain or swelling. He does report mild activity related knee and ankle pain, but this is minor. He reports constant pain in his hands and feet which have been occurring for the past 5 years. This usually flares up with the cold weather. He has tried multiple over-the-counter pain medications and even steroids without any relief. No other joint pains. No joint swelling or morning stiffness. He has been tolerating the sulfasalazine well without any concerns for side effects or infections. 7/6/2022:  Patient presents for a follow-up of peripheral spondyloarthritis. He is currently on sulfasalazine 500 mg twice daily. I reviewed his labs from April which showed an unremarkable CBC and CMP. He reports overall he has been doing well without any flare ups in joint pains, swelling or stiffness. He is not taking any over-the-counter pain medications at this time. We held off on prescribing the NSAIDs after the last office visit due to a mild decline in his kidney function and elevated liver enzymes. This has since improved.   He has also been focusing on a healthy lifestyle with exercising and dietary changes which has resulted in weight loss and he reports overall feeling better. He reports during the winter his joint pains generally flare and he will contact me at that time if pain medications are required. He has been tolerating the sulfasalazine well without any concerns for side effects or infections. 7/12/2023:  Patient presents for a follow-up of peripheral spondyloarthritis. He is currently on sulfasalazine 500 mg twice daily. He had labs done this morning which are pending. In terms of the joint pains he has been doing well without any flareups in pain, swelling or stiffness. He is not taking any over-the-counter pain medications at this time. He has been experiencing a new symptom of widespread, progressive skin rash occurring since December 2022. He was seen by his primary care physician and given a course of prednisone which helped but he states the symptoms recurred after discontinuing the steroids. There has been a consideration for this possibly being an infectious/fungal rash. He had autoimmune related serologies ordered by his primary care physician which he did this morning and are still pending. He has not been referred to dermatology. He has been tolerating the sulfasalazine well without any concerns for side effects or infections. The following portions of the patient's history were reviewed and updated as appropriate: allergies, current medications, past family history, past medical history, past social history, past surgical history and problem list.      Review of Systems  Constitutional: Negative for weight change, fevers, chills, night sweats, fatigue. ENT/Mouth: Negative for hearing changes, ear pain, nasal congestion, sinus pain, hoarseness, sore throat, rhinorrhea, swallowing difficulty. Eyes: Negative for pain, redness, discharge, vision changes. Cardiovascular: Negative for chest pain, SOB, palpitations. Respiratory: Negative for cough, sputum, wheezing, dyspnea. Gastrointestinal: Negative for nausea, vomiting, diarrhea, constipation, pain, heartburn. Genitourinary: Negative for dysuria, urinary frequency, hematuria. Musculoskeletal: As per HPI. Skin: Negative for color changes. Positive for skin rash. Neuro: Negative for weakness, numbness, tingling, loss of consciousness. Psych: Negative for anxiety, depression. Heme/Lymph: Negative for easy bruising, bleeding, lymphadenopathy. Past Medical History:   Diagnosis Date   • Abnormal ECG     Last Assessed 2/29/2016   • CAP (community acquired pneumonia) 10/12/2016   • COVID-19 8/11/2021   • Lyme disease 6/21/2021       Past Surgical History:   Procedure Laterality Date   • INGUINAL HERNIA REPAIR         Social History     Socioeconomic History   • Marital status:       Spouse name: Not on file   • Number of children: Not on file   • Years of education: Not on file   • Highest education level: Not on file   Occupational History   • Not on file   Tobacco Use   • Smoking status: Former   • Smokeless tobacco: Current     Types: Chew   Vaping Use   • Vaping Use: Never used   Substance and Sexual Activity   • Alcohol use: Yes     Comment: social   • Drug use: No   • Sexual activity: Yes     Partners: Female     Comment: Wife   Other Topics Concern   • Not on file   Social History Narrative   • Not on file     Social Determinants of Health     Financial Resource Strain: Not on file   Food Insecurity: Not on file   Transportation Needs: Not on file   Physical Activity: Not on file   Stress: Not on file   Social Connections: Not on file   Intimate Partner Violence: Not on file   Housing Stability: Not on file       Family History   Problem Relation Age of Onset   • Cancer Family    • Colon cancer Father    • COPD Father        No Known Allergies      Current Outpatient Medications:   •  sulfaSALAzine (AZULFIDINE) 500 mg tablet, Take 1 tablet (500 mg total) by mouth 2 (two) times a day, Disp: 180 tablet, Rfl: 1  •  cephalexin (KEFLEX) 500 mg capsule, Take 1 capsule (500 mg total) by mouth every 6 (six) hours for 7 days, Disp: 28 capsule, Rfl: 0  •  clotrimazole (LOTRIMIN) 1 % cream, Apply topically 2 (two) times a day, Disp: 30 g, Rfl: 0  •  sildenafil (VIAGRA) 100 mg tablet, Take 1 tablet (100 mg total) by mouth daily as needed for erectile dysfunction, Disp: 30 tablet, Rfl: 0      Objective:    Vitals:    07/12/23 1040   BP: 162/84   Pulse: (!) 112       Physical Exam  General: Well appearing, well nourished, in no distress. Oriented x 3, normal mood and affect. Ambulating without difficulty. Skin: Good turgor, no unusual bruising or prominent lesions. He has a diffuse papular, erythematous skin rash over areas including the extensor aspects of his forearms and bilateral knees. Facial erythema is noted. Hair: Normal texture and distribution. Nails: Normal color, no deformities. HEENT:  Head: Normocephalic, atraumatic. Eyes: Conjunctiva clear, sclera non-icteric, EOM intact. Extremities: No amputations or deformities, cyanosis, edema. Musculoskeletal:   No peripheral joint soft tissue swelling noted. Neurologic: Alert and oriented. No focal neurological deficits appreciated. Psychiatric: Normal mood and affect. Makayla Henderson M.D.   Rheumatology

## 2023-07-12 ENCOUNTER — APPOINTMENT (OUTPATIENT)
Dept: LAB | Facility: CLINIC | Age: 68
End: 2023-07-12
Payer: MEDICARE

## 2023-07-12 ENCOUNTER — OFFICE VISIT (OUTPATIENT)
Dept: RHEUMATOLOGY | Facility: CLINIC | Age: 68
End: 2023-07-12
Payer: MEDICARE

## 2023-07-12 VITALS — HEART RATE: 112 BPM | DIASTOLIC BLOOD PRESSURE: 84 MMHG | SYSTOLIC BLOOD PRESSURE: 162 MMHG

## 2023-07-12 DIAGNOSIS — Z12.5 SCREENING PSA (PROSTATE SPECIFIC ANTIGEN): ICD-10-CM

## 2023-07-12 DIAGNOSIS — Z79.899 ENCOUNTER FOR MONITORING SULFASALAZINE THERAPY: ICD-10-CM

## 2023-07-12 DIAGNOSIS — R21 SKIN RASH: ICD-10-CM

## 2023-07-12 DIAGNOSIS — M47.819 PERIPHERAL SPONDYLOARTHRITIS: Primary | ICD-10-CM

## 2023-07-12 DIAGNOSIS — Z13.220 LIPID SCREENING: ICD-10-CM

## 2023-07-12 DIAGNOSIS — Z51.81 ENCOUNTER FOR MONITORING SULFASALAZINE THERAPY: ICD-10-CM

## 2023-07-12 DIAGNOSIS — L13.0 DERMATITIS HERPETIFORMIS: ICD-10-CM

## 2023-07-12 DIAGNOSIS — Z13.1 DIABETES MELLITUS SCREENING: ICD-10-CM

## 2023-07-12 LAB
ALBUMIN SERPL BCP-MCNC: 4.1 G/DL (ref 3.5–5)
ALP SERPL-CCNC: 76 U/L (ref 46–116)
ALT SERPL W P-5'-P-CCNC: 46 U/L (ref 12–78)
ANA SER QL IA: NEGATIVE
ANION GAP SERPL CALCULATED.3IONS-SCNC: 8 MMOL/L
AST SERPL W P-5'-P-CCNC: 28 U/L (ref 5–45)
BASOPHILS # BLD AUTO: 0.06 THOUSANDS/ÂΜL (ref 0–0.1)
BASOPHILS NFR BLD AUTO: 1 % (ref 0–1)
BILIRUB SERPL-MCNC: 0.8 MG/DL (ref 0.2–1)
BUN SERPL-MCNC: 12 MG/DL (ref 5–25)
CALCIUM SERPL-MCNC: 9.5 MG/DL (ref 8.3–10.1)
CHLORIDE SERPL-SCNC: 106 MMOL/L (ref 96–108)
CHOLEST SERPL-MCNC: 271 MG/DL
CO2 SERPL-SCNC: 26 MMOL/L (ref 21–32)
CREAT SERPL-MCNC: 1.11 MG/DL (ref 0.6–1.3)
EOSINOPHIL # BLD AUTO: 0.57 THOUSAND/ÂΜL (ref 0–0.61)
EOSINOPHIL NFR BLD AUTO: 7 % (ref 0–6)
ERYTHROCYTE [DISTWIDTH] IN BLOOD BY AUTOMATED COUNT: 13.3 % (ref 11.6–15.1)
GFR SERPL CREATININE-BSD FRML MDRD: 68 ML/MIN/1.73SQ M
GLUCOSE P FAST SERPL-MCNC: 110 MG/DL (ref 65–99)
HCT VFR BLD AUTO: 48.9 % (ref 36.5–49.3)
HDLC SERPL-MCNC: 74 MG/DL
HGB BLD-MCNC: 15.8 G/DL (ref 12–17)
IMM GRANULOCYTES # BLD AUTO: 0.05 THOUSAND/UL (ref 0–0.2)
IMM GRANULOCYTES NFR BLD AUTO: 1 % (ref 0–2)
LDLC SERPL CALC-MCNC: 172 MG/DL (ref 0–100)
LYMPHOCYTES # BLD AUTO: 2.24 THOUSANDS/ÂΜL (ref 0.6–4.47)
LYMPHOCYTES NFR BLD AUTO: 26 % (ref 14–44)
MCH RBC QN AUTO: 31.9 PG (ref 26.8–34.3)
MCHC RBC AUTO-ENTMCNC: 32.3 G/DL (ref 31.4–37.4)
MCV RBC AUTO: 99 FL (ref 82–98)
MONOCYTES # BLD AUTO: 0.93 THOUSAND/ÂΜL (ref 0.17–1.22)
MONOCYTES NFR BLD AUTO: 11 % (ref 4–12)
NEUTROPHILS # BLD AUTO: 4.7 THOUSANDS/ÂΜL (ref 1.85–7.62)
NEUTS SEG NFR BLD AUTO: 54 % (ref 43–75)
NONHDLC SERPL-MCNC: 197 MG/DL
NRBC BLD AUTO-RTO: 0 /100 WBCS
PLATELET # BLD AUTO: 233 THOUSANDS/UL (ref 149–390)
PMV BLD AUTO: 11.2 FL (ref 8.9–12.7)
POTASSIUM SERPL-SCNC: 4 MMOL/L (ref 3.5–5.3)
PROT SERPL-MCNC: 7.2 G/DL (ref 6.4–8.4)
PSA SERPL-MCNC: 2.29 NG/ML (ref 0–4)
RBC # BLD AUTO: 4.95 MILLION/UL (ref 3.88–5.62)
RHEUMATOID FACT SER QL LA: NEGATIVE
SODIUM SERPL-SCNC: 140 MMOL/L (ref 135–147)
TRIGL SERPL-MCNC: 127 MG/DL
TSH SERPL DL<=0.05 MIU/L-ACNC: 1.99 UIU/ML (ref 0.45–4.5)
VIT B12 SERPL-MCNC: 436 PG/ML (ref 180–914)
WBC # BLD AUTO: 8.55 THOUSAND/UL (ref 4.31–10.16)

## 2023-07-12 PROCEDURE — 83036 HEMOGLOBIN GLYCOSYLATED A1C: CPT

## 2023-07-12 PROCEDURE — 80061 LIPID PANEL: CPT

## 2023-07-12 PROCEDURE — 86038 ANTINUCLEAR ANTIBODIES: CPT

## 2023-07-12 PROCEDURE — G0103 PSA SCREENING: HCPCS

## 2023-07-12 PROCEDURE — 86258 DGP ANTIBODY EACH IG CLASS: CPT

## 2023-07-12 PROCEDURE — 86235 NUCLEAR ANTIGEN ANTIBODY: CPT

## 2023-07-12 PROCEDURE — 86364 TISS TRNSGLTMNASE EA IG CLAS: CPT

## 2023-07-12 PROCEDURE — 82607 VITAMIN B-12: CPT

## 2023-07-12 PROCEDURE — 84443 ASSAY THYROID STIM HORMONE: CPT

## 2023-07-12 PROCEDURE — 36415 COLL VENOUS BLD VENIPUNCTURE: CPT

## 2023-07-12 PROCEDURE — 99215 OFFICE O/P EST HI 40 MIN: CPT | Performed by: INTERNAL MEDICINE

## 2023-07-12 PROCEDURE — 86231 EMA EACH IG CLASS: CPT

## 2023-07-12 PROCEDURE — 82784 ASSAY IGA/IGD/IGG/IGM EACH: CPT

## 2023-07-12 PROCEDURE — 86430 RHEUMATOID FACTOR TEST QUAL: CPT

## 2023-07-12 RX ORDER — SULFASALAZINE 500 MG/1
500 TABLET ORAL 2 TIMES DAILY
Qty: 180 TABLET | Refills: 1 | Status: SHIPPED | OUTPATIENT
Start: 2023-07-12

## 2023-07-13 LAB
ENA SS-A AB SER-ACNC: <0.2 AI (ref 0–0.9)
ENA SS-B AB SER-ACNC: <0.2 AI (ref 0–0.9)
ENDOMYSIUM IGA SER QL: NEGATIVE
EST. AVERAGE GLUCOSE BLD GHB EST-MCNC: 97 MG/DL
GLIADIN PEPTIDE IGA SER-ACNC: 37 UNITS (ref 0–19)
GLIADIN PEPTIDE IGG SER-ACNC: 26 UNITS (ref 0–19)
HBA1C MFR BLD: 5 %
IGA SERPL-MCNC: 170 MG/DL (ref 61–437)
TTG IGA SER-ACNC: 6 U/ML (ref 0–3)
TTG IGG SER-ACNC: <2 U/ML (ref 0–5)

## 2023-07-14 ENCOUNTER — TELEPHONE (OUTPATIENT)
Dept: ADMINISTRATIVE | Facility: OTHER | Age: 68
End: 2023-07-14

## 2023-07-14 ENCOUNTER — OFFICE VISIT (OUTPATIENT)
Dept: FAMILY MEDICINE CLINIC | Facility: CLINIC | Age: 68
End: 2023-07-14
Payer: MEDICARE

## 2023-07-14 VITALS
BODY MASS INDEX: 33.18 KG/M2 | TEMPERATURE: 97.8 F | SYSTOLIC BLOOD PRESSURE: 138 MMHG | WEIGHT: 211.4 LBS | HEIGHT: 67 IN | RESPIRATION RATE: 18 BRPM | HEART RATE: 75 BPM | DIASTOLIC BLOOD PRESSURE: 80 MMHG | OXYGEN SATURATION: 97 %

## 2023-07-14 DIAGNOSIS — L40.9 PSORIASIS: Primary | ICD-10-CM

## 2023-07-14 PROCEDURE — 99213 OFFICE O/P EST LOW 20 MIN: CPT | Performed by: FAMILY MEDICINE

## 2023-07-14 RX ORDER — PREDNISONE 10 MG/1
TABLET ORAL
Qty: 20 TABLET | Refills: 0 | Status: SHIPPED | OUTPATIENT
Start: 2023-07-14 | End: 2023-07-22

## 2023-07-14 NOTE — ASSESSMENT & PLAN NOTE
Trial of short steroid taper.  Recommend and he is agreeable to following up with a dermatologist as also recommended by his rheumatologist.

## 2023-07-14 NOTE — TELEPHONE ENCOUNTER
Upon review of the In Basket request and the patient's chart, initial outreach has been made via fax to facility. Please see Contacts section for details.      Thank you  Marina Thompson MA

## 2023-07-14 NOTE — LETTER
Procedure Request Form: Colonoscopy      Date Requested: 23  Patient: Gray Huizar  Patient : 1955   Referring Provider: Dequan Moses, DO        Date of Procedure ______________________________       The above patient has informed us that they have completed their   most recent Colonoscopy at your facility. Please complete   this form and attach all corresponding procedure reports/results. Comments ________ [facility]  ____________________________________  ____________________________________________________________________  ____________________________________________________________________  ____________________________________________________________________    Facility Completing Procedure _________________________________________    Form Completed By (print name) _______________________________________      Signature __________________________________________________________      These reports are needed for  compliance. Please fax this completed form and a copy of the procedure report to our office located at [address] [place] [address] as soon as possible to Fax [phone] humble Goldberg: Phone [phone]    We thank you for your assistance in treating our mutual patient.

## 2023-07-14 NOTE — TELEPHONE ENCOUNTER
----- Message from Margy Giles MA sent at 7/14/2023  9:13 AM EDT -----  07/14/23 9:15 AM    Hello, our patient Shary Halsted has had CRC: Colonoscopy completed/performed. Please assist in updating the patient chart by making an External outreach to [address] [place] [address] located in [place] [address]. The date of service is 2018 or 2019.     Thank you,  Margy Giles MA  [facility]

## 2023-07-14 NOTE — LETTER
Procedure Request Form: Colonoscopy      Date Requested: 23  Patient: Destini Del Angel  Patient : 1955   Referring Provider: Joshua Thakur, DO        Date of Procedure ______________________________       The above patient has informed us that they have completed their   most recent Colonoscopy at your facility. Please complete   this form and attach all corresponding procedure reports/results. Comments ___________ [facility] [id number] _________________________________  ____________________________________________________________________  ____________________________________________________________________  ____________________________________________________________________    Facility Completing Procedure _________________________________________    Form Completed By (print name) _______________________________________      Signature __________________________________________________________      These reports are needed for  compliance. Please fax this completed form and a copy of the procedure report to our office located at [address] [place] [address] as soon as possible to Fax [phone] attention Reddy Boo: Phone [phone]    We thank you for your assistance in treating our mutual patient.

## 2023-07-14 NOTE — PROGRESS NOTES
Name: Nayeli Echevarria      : 1955      MRN: [id number]  Encounter Provider: Chris Garcia DO  Encounter Date: 2023   Encounter department: [address] [place] [address]. Psoriasis  Assessment & Plan:  Trial of short steroid taper. Recommend and he is agreeable to following up with a dermatologist as also recommended by his rheumatologist.     Orders:  -     predniSONE 10 mg tablet; Take 4 tablets (40 mg total) by mouth daily for 2 days, THEN 3 tablets (30 mg total) daily for 2 days, THEN 2 tablets (20 mg total) daily for 2 days, THEN 1 tablet (10 mg total) daily for 2 days. Subjective      HPI   Patient presents for follow up rash. Was seen one week ago, thought to have skin infection and prescribed Keflex and Lotrimin. Petrolia rash in the axillae has improved somewhat, however, rash on extensor surfaces it not improving. Saw rheumatology a few days ago, suggested he may have psoriasis and they put a referral in for a dermatologist. He is itching nearly all the time.         Review of Systems as in HPI    Current Outpatient Medications on File Prior to Visit   Medication Sig   • clotrimazole (LOTRIMIN) 1 % cream Apply topically 2 (two) times a day   • sildenafil (VIAGRA) 100 mg tablet Take 1 tablet (100 mg total) by mouth daily as needed for erectile dysfunction   • sulfaSALAzine (AZULFIDINE) 500 mg tablet Take 1 tablet (500 mg total) by mouth 2 (two) times a day   • [DISCONTINUED] cephalexin (KEFLEX) 500 mg capsule Take 1 capsule (500 mg total) by mouth every 6 (six) hours for 7 days       Objective     /80 (BP Location: Left arm, Patient Position: Sitting, Cuff Size: Large)   Pulse 75   Temp 97.8 °F (36.6 °C) (Temporal)   Resp 18   Ht 5' 7" (1.702 m)   Wt 95.9 kg (211 lb 6.4 oz)   SpO2 97%   BMI 33.11 kg/m²     Physical Exam           Chris Garcia DO

## 2023-07-24 NOTE — TELEPHONE ENCOUNTER
Upon review of the In Basket request we have found as a result of outreach that patient did not have the requested item(s) completed. Any additional questions or concerns should be emailed to the Practice Liaisons via the appropriate education email address, please do not reply via In Basket.     Thank you  Ann Ricketts MA

## 2023-07-24 NOTE — TELEPHONE ENCOUNTER
As a follow-up, a second attempt has been made for outreach via fax to facility. Please see Contacts section for details.     Thank you  Mai Martínez MA

## 2023-08-08 NOTE — PROGRESS NOTES
Assessment and Plan:   Patient is a 61-year-old male who presents for rheumatology follow-up regarding a possible inflammatory arthritis manifesting as a left knee monoarthritis  He has had workup including knee MRI which did not show any significant internal derangement  His synovial fluid aspiration from a few months ago showed mild inflammatory fluid with a WBC of 5000, but was otherwise negative for crystals and Lyme PCR  His additional workup with blood work was also unremarkable to suggest clear etiology for the recurrent knee swelling  Did respond to the steroid injection but it only lasted for about 4 weeks and he does have recurrent swelling of the left knee on exam today along with warmth  There is no evidence of other joint swelling today on exam   I discussed with him it is possible he does have a form of a spondyloarthropathy with predominant left knee monoarthritis  We discussed that it would be best to try to utilize another medication aside from prednisone since he has been using this chronically now  His knee swelling and pain is also very prednisone responsive so far  We discussed the risks and benefits of sulfasalazine and he was agreeable to try this medication with a goal of weaning off prednisone in the future  If this is not effective we may need to send him back to Orthopedics to reassess the MRI or look for other etiologies since it is not entirely clear, but given the prednisone responsiveness and inflammatory joint fluid it is reasonable to try a DMARD  He was agreeable with that plan  He will titrate up the dose to sulfasalazine to 1000 mg b i d  over the next few weeks  I instructed him to continue with prednisone 5 mg for the next 4-6 weeks and then at that point if he is able to wean to 2 5 mg daily that would be a good test to see if the sulfasalazine is working for him  We will give it a several week trial as well    He is aware he needs labs every 4 weeks for now and he will start getting labs 2 weeks after starting the sulfasalazine and then every 4 weeks  We will follow-up in about 10 weeks to reassess  Plan:  Diagnoses and all orders for this visit:    Inflammatory arthritis  -     sulfaSALAzine (AZULFIDINE) 500 mg tablet; 1 tab daily x7 days, then 1 tab BID x7 days, then 2 tabs BID thereafter    Long term use of drug  -     C-reactive protein; Standing  -     Comprehensive metabolic panel; Standing  -     Sedimentation rate, automated; Standing  -     CBC and differential; Standing  -     C-reactive protein  -     Comprehensive metabolic panel  -     Sedimentation rate, automated  -     CBC and differential    Monoarthritis of left knee    Current chronic use of systemic steroids        Follow-up plan: 10 weeks        Rheumatic Disease Summary  1   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   -Rheum consult 10/3/19 for left knee pain/swelling; previously saw Dr Letty Molina around 2014 for 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 and tried plaquenil but stopped due to diarrhea; improvement with diclofenac and prednisone courses   -Labs 2015: ESTEFANÍA 40, negative dsDNA, prabhakar, RNP, SSA, SSB,   -Presented on pred 15mg and meloxicam   -Synovial fluid left knee 9/10/19: WBC 5,305, 26% neutrophils, negative crystals and Lyme PCR   -Labs 8/2019: negative RF, lyme, hep c   -MRI left knee 9/25/19: small joint effusion, no mention of synovitis, MCL sprain, no internal derangement noted   -XR hands/feet/SI joints 10/3/19: OA, no erosive inflammatory changes   -Labs 10/4/19: negative ESTEFANÍA, CCP, SSA, SSB, HLA-B27, RPR, GC/chlamydia, TB, HIV, hep panel, uric acid 6 7, CRP 5 7, ESR 31, vit D 18  -Initial visit: no IA on exam on pred 15mg, advised to wean to 10mg  -Visit 12/16/19: attempted left knee aspiration with minimal bloody fluid, injected kenalog, wean pred to 7 5mg then 5mg if able; no signs of a systemic IA   -Visit 1/15/20: improved temporarily with injection, pred down to 5mg but recurrent swelling/warmth; started sulfasalazine for possible inflammatory monoarthritis with goal of weaning off prednisone  2  Comorbidities: GERD      QIANA Williamson  is a 62 y o   male who presents for rheumatology follow-up regarding persistent swelling and pain of the left knee requiring chronic systemic steroid use  At last visit I gave him a left knee steroid injection  He previously did have knee aspiration which showed mildly inflammatory fluid with a WBC of 5000, otherwise negative workup on the synovial fluid including Lyme  He reports a definite improvement after the left knee injection but it lasted only about 4 weeks  He recalls the knee started swelling again around new year's [place]  He was able to wean down his prednisone to 5mg which he has been on for 3 weeks now but has felt a difference while being on the lower dose in terms of the knee pain and swelling  He has been back to work again with difficulties since the knee started reswelling  No other new joint involvement  He has longstanding issues with stiffness in his PIP joints but this is present all the time regardless of rest or activity  No obvious swelling  No new rashes or other health changes  He is not currently taking any NSAIDs as previously they did not help him  The following portions of the patient's history were reviewed and updated as appropriate: allergies, current medications, past family history, past medical history, past social history, past surgical history and problem list     Review of Systems:   Review of Systems   Constitutional: Negative for chills, fatigue, fever and unexpected weight change  HENT: Negative for mouth sores and trouble swallowing  Eyes: Negative for pain and visual disturbance  Respiratory: Negative for cough and shortness of breath  Cardiovascular: Negative for chest pain and leg swelling     Gastrointestinal: Negative for abdominal pain, blood in stool, constipation, diarrhea and nausea  Genitourinary: Negative for hematuria  Musculoskeletal: Positive for arthralgias and joint swelling (left knee)  Negative for back pain and myalgias  Skin: Negative for rash  Neurological: Negative for weakness and numbness  Hematological: Negative for adenopathy  Psychiatric/Behavioral: Negative for sleep disturbance  Home Medications:    Current Outpatient Medications:     ergocalciferol (VITAMIN D2) 50,000 units, Take 1 capsule (50,000 Units total) by mouth once a week, Disp: 12 capsule, Rfl: 0    omeprazole (PriLOSEC) 40 MG capsule, Take 1 capsule (40 mg total) by mouth daily, Disp: 30 capsule, Rfl: 5    predniSONE 5 mg tablet, TAKE 3 TABLETS BY MOUTH DAILY (Patient taking differently: 1 mg daily ), Disp: 90 tablet, Rfl: 1    sulfaSALAzine (AZULFIDINE) 500 mg tablet, 1 tab daily x7 days, then 1 tab BID x7 days, then 2 tabs BID thereafter, Disp: 120 tablet, Rfl: 2    Objective:    Vitals:    01/15/20 1125   BP: 144/82   Pulse: 100   Weight: 102 kg (225 lb)   Height: 5' 7" (1 702 m)       Physical Exam   Constitutional: He appears well-developed and well-nourished  He is cooperative  HENT:   Head: Normocephalic and atraumatic  Eyes: Conjunctivae and EOM are normal  No scleral icterus  Neck: No spinous process tenderness and no muscular tenderness present  No thyromegaly present  Musculoskeletal:   Left knee effusion with warmth and tenderness  No other significant joint swelling or tenderness    Lymphadenopathy:     He has no cervical adenopathy  Neurological: He is alert  No sensory deficit  Skin: Skin is warm and dry  No rash noted  Nails show no clubbing  Psychiatric: He has a normal mood and affect  Walking

## 2023-09-08 ENCOUNTER — APPOINTMENT (OUTPATIENT)
Dept: LAB | Facility: CLINIC | Age: 68
End: 2023-09-08
Payer: MEDICARE

## 2023-09-08 DIAGNOSIS — R97.20 INCREASED PROSTATE SPECIFIC ANTIGEN (PSA) VELOCITY: ICD-10-CM

## 2023-09-08 DIAGNOSIS — Z51.81 ENCOUNTER FOR MONITORING SULFASALAZINE THERAPY: ICD-10-CM

## 2023-09-08 DIAGNOSIS — M47.819 PERIPHERAL SPONDYLOARTHRITIS: ICD-10-CM

## 2023-09-08 DIAGNOSIS — Z79.899 ENCOUNTER FOR MONITORING SULFASALAZINE THERAPY: ICD-10-CM

## 2023-09-08 LAB
ALBUMIN SERPL BCP-MCNC: 4.1 G/DL (ref 3.5–5)
ALP SERPL-CCNC: 65 U/L (ref 34–104)
ALT SERPL W P-5'-P-CCNC: 20 U/L (ref 7–52)
ANION GAP SERPL CALCULATED.3IONS-SCNC: 10 MMOL/L
AST SERPL W P-5'-P-CCNC: 22 U/L (ref 13–39)
BASOPHILS # BLD AUTO: 0.08 THOUSANDS/ÂΜL (ref 0–0.1)
BASOPHILS NFR BLD AUTO: 1 % (ref 0–1)
BILIRUB SERPL-MCNC: 0.82 MG/DL (ref 0.2–1)
BUN SERPL-MCNC: 13 MG/DL (ref 5–25)
CALCIUM SERPL-MCNC: 9.3 MG/DL (ref 8.4–10.2)
CHLORIDE SERPL-SCNC: 102 MMOL/L (ref 96–108)
CO2 SERPL-SCNC: 27 MMOL/L (ref 21–32)
CREAT SERPL-MCNC: 1.14 MG/DL (ref 0.6–1.3)
CRP SERPL QL: 7.4 MG/L
EOSINOPHIL # BLD AUTO: 0.43 THOUSAND/ÂΜL (ref 0–0.61)
EOSINOPHIL NFR BLD AUTO: 5 % (ref 0–6)
ERYTHROCYTE [DISTWIDTH] IN BLOOD BY AUTOMATED COUNT: 14.4 % (ref 11.6–15.1)
ERYTHROCYTE [SEDIMENTATION RATE] IN BLOOD: 19 MM/HOUR (ref 0–19)
GFR SERPL CREATININE-BSD FRML MDRD: 66 ML/MIN/1.73SQ M
GLUCOSE P FAST SERPL-MCNC: 108 MG/DL (ref 65–99)
HCT VFR BLD AUTO: 50.1 % (ref 36.5–49.3)
HGB BLD-MCNC: 16.1 G/DL (ref 12–17)
IMM GRANULOCYTES # BLD AUTO: 0.04 THOUSAND/UL (ref 0–0.2)
IMM GRANULOCYTES NFR BLD AUTO: 0 % (ref 0–2)
LYMPHOCYTES # BLD AUTO: 2.9 THOUSANDS/ÂΜL (ref 0.6–4.47)
LYMPHOCYTES NFR BLD AUTO: 32 % (ref 14–44)
MCH RBC QN AUTO: 31.1 PG (ref 26.8–34.3)
MCHC RBC AUTO-ENTMCNC: 32.1 G/DL (ref 31.4–37.4)
MCV RBC AUTO: 97 FL (ref 82–98)
MONOCYTES # BLD AUTO: 0.92 THOUSAND/ÂΜL (ref 0.17–1.22)
MONOCYTES NFR BLD AUTO: 10 % (ref 4–12)
NEUTROPHILS # BLD AUTO: 4.64 THOUSANDS/ÂΜL (ref 1.85–7.62)
NEUTS SEG NFR BLD AUTO: 52 % (ref 43–75)
NRBC BLD AUTO-RTO: 0 /100 WBCS
PLATELET # BLD AUTO: 244 THOUSANDS/UL (ref 149–390)
PMV BLD AUTO: 11.5 FL (ref 8.9–12.7)
POTASSIUM SERPL-SCNC: 4.3 MMOL/L (ref 3.5–5.3)
PROT SERPL-MCNC: 6.8 G/DL (ref 6.4–8.4)
PSA SERPL-MCNC: 1.6 NG/ML (ref 0–4)
RBC # BLD AUTO: 5.18 MILLION/UL (ref 3.88–5.62)
SODIUM SERPL-SCNC: 139 MMOL/L (ref 135–147)
WBC # BLD AUTO: 9.01 THOUSAND/UL (ref 4.31–10.16)

## 2023-09-08 PROCEDURE — 86140 C-REACTIVE PROTEIN: CPT

## 2023-09-08 PROCEDURE — 85025 COMPLETE CBC W/AUTO DIFF WBC: CPT

## 2023-09-08 PROCEDURE — 85652 RBC SED RATE AUTOMATED: CPT

## 2023-09-08 PROCEDURE — 84153 ASSAY OF PSA TOTAL: CPT

## 2023-09-08 PROCEDURE — 80053 COMPREHEN METABOLIC PANEL: CPT

## 2023-09-08 PROCEDURE — 36415 COLL VENOUS BLD VENIPUNCTURE: CPT

## 2023-09-10 DIAGNOSIS — R97.20 INCREASED PROSTATE SPECIFIC ANTIGEN (PSA) VELOCITY: Primary | ICD-10-CM

## 2023-09-11 ENCOUNTER — TELEPHONE (OUTPATIENT)
Dept: FAMILY MEDICINE CLINIC | Facility: CLINIC | Age: 68
End: 2023-09-11

## 2023-09-11 NOTE — TELEPHONE ENCOUNTER
----- Message from Brynn Nicholson sent at 9/10/2023  8:21 PM EDT -----  Please let patient know PSA has improved, but is still at a higher increase than expected over the last one year. Urology follow up is recommended. Please give him contact information for [facility].

## 2023-09-14 ENCOUNTER — OFFICE VISIT (OUTPATIENT)
Dept: FAMILY MEDICINE CLINIC | Facility: CLINIC | Age: 68
End: 2023-09-14
Payer: MEDICARE

## 2023-09-14 VITALS
RESPIRATION RATE: 18 BRPM | BODY MASS INDEX: 33.61 KG/M2 | SYSTOLIC BLOOD PRESSURE: 136 MMHG | DIASTOLIC BLOOD PRESSURE: 82 MMHG | HEART RATE: 68 BPM | OXYGEN SATURATION: 97 % | HEIGHT: 67 IN | TEMPERATURE: 97.8 F | WEIGHT: 214.13 LBS

## 2023-09-14 DIAGNOSIS — N52.9 ERECTILE DYSFUNCTION, UNSPECIFIED ERECTILE DYSFUNCTION TYPE: ICD-10-CM

## 2023-09-14 DIAGNOSIS — Z00.00 MEDICARE ANNUAL WELLNESS VISIT, SUBSEQUENT: Primary | ICD-10-CM

## 2023-09-14 DIAGNOSIS — I10 BENIGN ESSENTIAL HTN: ICD-10-CM

## 2023-09-14 DIAGNOSIS — E78.5 HYPERLIPIDEMIA, UNSPECIFIED HYPERLIPIDEMIA TYPE: ICD-10-CM

## 2023-09-14 DIAGNOSIS — R89.4 ABNORMAL CELIAC ANTIBODY PANEL: ICD-10-CM

## 2023-09-14 PROCEDURE — 99214 OFFICE O/P EST MOD 30 MIN: CPT | Performed by: FAMILY MEDICINE

## 2023-09-14 PROCEDURE — G0439 PPPS, SUBSEQ VISIT: HCPCS | Performed by: FAMILY MEDICINE

## 2023-09-14 RX ORDER — SILDENAFIL 100 MG/1
100 TABLET, FILM COATED ORAL DAILY PRN
Qty: 30 TABLET | Refills: 0 | Status: SHIPPED | OUTPATIENT
Start: 2023-09-14

## 2023-09-14 RX ORDER — ATORVASTATIN CALCIUM 20 MG/1
20 TABLET, FILM COATED ORAL DAILY
Qty: 30 TABLET | Refills: 5 | Status: SHIPPED | OUTPATIENT
Start: 2023-09-14

## 2023-09-14 NOTE — PATIENT INSTRUCTIONS
Medicare Preventive Visit Patient Instructions  Thank you for completing your Welcome to Medicare Visit or Medicare Annual Wellness Visit today. Your next wellness visit will be due in one year (9/14/2024). The screening/preventive services that you may require over the next 5-10 years are detailed below. Some tests may not apply to you based off risk factors and/or age. Screening tests ordered at today's visit but not completed yet may show as past due. Also, please note that scanned in results may not display below. Preventive Screenings:  Service Recommendations Previous Testing/Comments   Colorectal Cancer Screening  · Colonoscopy    · Fecal Occult Blood Test (FOBT)/Fecal Immunochemical Test (FIT)  · Fecal DNA/Cologuard Test  · Flexible Sigmoidoscopy Age: 43-73 years old   Colonoscopy: every 10 years (May be performed more frequently if at higher risk)  OR  FOBT/FIT: every 1 year  OR  Cologuard: every 3 years  OR  Sigmoidoscopy: every 5 years  Screening may be recommended earlier than age 39 if at higher risk for colorectal cancer. Also, an individualized decision between you and your healthcare provider will decide whether screening between the ages of 77-80 would be appropriate.  Colonoscopy: Not on file  FOBT/FIT: Not on file  Cologuard: Not on file  Sigmoidoscopy: Not on file          Prostate Cancer Screening Individualized decision between patient and health care provider in men between ages of 53-66   Medicare will cover every 12 months beginning on the day after your 50th birthday PSA: 1.60 ng/mL     Screening Current     Hepatitis C Screening Once for adults born between 1945 and 1965  More frequently in patients at high risk for Hepatitis C Hep C Antibody: 10/04/2019    Screening Current   Diabetes Screening 1-2 times per year if you're at risk for diabetes or have pre-diabetes Fasting glucose: 108 mg/dL (9/8/2023)  A1C: 5.0 % (7/12/2023)  Screening Current   Cholesterol Screening Once every 5 years if you don't have a lipid disorder. May order more often based on risk factors. Lipid panel: 07/12/2023  Screening Not Indicated  History Lipid Disorder      Other Preventive Screenings Covered by Medicare:  1. Abdominal Aortic Aneurysm (AAA) Screening: covered once if your at risk. You're considered to be at risk if you have a family history of AAA or a male between the age of 70-76 who smoking at least 100 cigarettes in your lifetime. 2. Lung Cancer Screening: covers low dose CT scan once per year if you meet all of the following conditions: (1) Age 48-67; (2) No signs or symptoms of lung cancer; (3) Current smoker or have quit smoking within the last 15 years; (4) You have a tobacco smoking history of at least 20 pack years (packs per day x number of years you smoked); (5) You get a written order from a healthcare provider. 3. Glaucoma Screening: covered annually if you're considered high risk: (1) You have diabetes OR (2) Family history of glaucoma OR (3)  aged 48 and older OR (3)  American aged 72 and older  3. Osteoporosis Screening: covered every 2 years if you meet one of the following conditions: (1) Have a vertebral abnormality; (2) On glucocorticoid therapy for more than 3 months; (3) Have primary hyperparathyroidism; (4) On osteoporosis medications and need to assess response to drug therapy. 5. HIV Screening: covered annually if you're between the age of 14-79. Also covered annually if you are younger than 13 and older than 72 with risk factors for HIV infection. For pregnant patients, it is covered up to 3 times per pregnancy.     Immunizations:  Immunization Recommendations   Influenza Vaccine Annual influenza vaccination during flu season is recommended for all persons aged >= 6 months who do not have contraindications   Pneumococcal Vaccine   * Pneumococcal conjugate vaccine = PCV13 (Prevnar 13), PCV15 (Vaxneuvance), PCV20 (Prevnar 20)  * Pneumococcal polysaccharide vaccine = PPSV23 (Pneumovax) Adults 2364 years old: 1-3 doses may be recommended based on certain risk factors  Adults 72 years old: 1-2 doses may be recommended based off what pneumonia vaccine you previously received   Hepatitis B Vaccine 3 dose series if at intermediate or high risk (ex: diabetes, end stage renal disease, liver disease)   Tetanus (Td) Vaccine - COST NOT COVERED BY MEDICARE PART B Following completion of primary series, a booster dose should be given every 10 years to maintain immunity against tetanus. Td may also be given as tetanus wound prophylaxis. Tdap Vaccine - COST NOT COVERED BY MEDICARE PART B Recommended at least once for all adults. For pregnant patients, recommended with each pregnancy. Shingles Vaccine (Shingrix) - COST NOT COVERED BY MEDICARE PART B  2 shot series recommended in those aged 48 and above     Health Maintenance Due:      Topic Date Due   • Colorectal Cancer Screening  Never done   • Hepatitis C Screening  Completed     Immunizations Due:      Topic Date Due   • COVID-19 Vaccine (6 - Moderna series) 07/17/2023   • Influenza Vaccine (1) 09/01/2023     Advance Directives   What are advance directives? Advance directives are legal documents that state your wishes and plans for medical care. These plans are made ahead of time in case you lose your ability to make decisions for yourself. Advance directives can apply to any medical decision, such as the treatments you want, and if you want to donate organs. What are the types of advance directives? There are many types of advance directives, and each state has rules about how to use them. You may choose a combination of any of the following:  · Living will: This is a written record of the treatment you want. You can also choose which treatments you do not want, which to limit, and which to stop at a certain time. This includes surgery, medicine, IV fluid, and tube feedings.    · Durable power of  for healthcare [facility]): This is a written record that states who you want to make healthcare choices for you when you are unable to make them for yourself. This person, called a proxy, is usually a family member or a friend. You may choose more than 1 proxy. · Do not resuscitate (DNR) order:  A DNR order is used in case your heart stops beating or you stop breathing. It is a request not to have certain forms of treatment, such as CPR. A DNR order may be included in other types of advance directives. · Medical directive: This covers the care that you want if you are in a coma, near death, or unable to make decisions for yourself. You can list the treatments you want for each condition. Treatment may include pain medicine, surgery, blood transfusions, dialysis, IV or tube feedings, and a ventilator (breathing machine). · Values history: This document has questions about your views, beliefs, and how you feel and think about life. This information can help others choose the care that you would choose. Why are advance directives important? An advance directive helps you control your care. Although spoken wishes may be used, it is better to have your wishes written down. Spoken wishes can be misunderstood, or not followed. Treatments may be given even if you do not want them. An advance directive may make it easier for your family to make difficult choices about your care. Fall Prevention    Fall prevention  includes ways to make your home and other areas safer. It also includes ways you can move more carefully to prevent a fall. Health conditions that cause changes in your blood pressure, vision, or muscle strength and coordination may increase your risk for falls. Medicines may also increase your risk for falls if they make you dizzy, weak, or sleepy. Fall prevention tips:   · Stand or sit up slowly. · Use assistive devices as directed. · Wear shoes that fit well and have soles that . · Wear a personal alarm. · Stay active. · Manage your medical conditions. Home Safety Tips:  · Add items to prevent falls in the bathroom. · Keep paths clear. · Install bright lights in your home. · Keep items you use often on shelves within reach. · Paint or place reflective tape on the edges of your stairs. How to Quit Using Smokeless Tobacco   Why it is important to stop using smokeless tobacco:  Smokeless tobacco comes in many forms. Examples include chew, snuff, dip, dissolvable tobacco, and snus. All smokeless tobacco products contain nicotine and may contain as much nicotine as 3 cigarettes. You may be physically dependent on nicotine. You may also be emotionally addicted to it. The cravings can be strong, but it is important to quit using smokeless tobacco. You will improve your health and decrease your cancer, stroke, and heart attack risk. Mouth sores and tooth problems will also improve when you quit. You can benefit from quitting no matter how long you have used smokeless tobacco.   Prepare to stop using smokeless tobacco:  Nicotine is a highly addictive drug. Withdrawal symptoms can happen when you stop and make it hard to quit. The following can help keep you on track:  · Set a quit date. · Tell friends, family, and coworkers that you plan to quit. · Remove all smokeless tobacco products from your home, car, and workplace. Manage weight gain after you quit:  Nicotine can affect your metabolism. You may gain a few pounds after you quit. The following can help you control your weight:  · Eat healthy foods. · Drink water before, during, and between meals. · Exercise as directed. Weight Management   Why it is important to manage your weight:  Being overweight increases your risk of health conditions such as heart disease, high blood pressure, type 2 diabetes, and certain types of cancer. It can also increase your risk for osteoarthritis, sleep apnea, and other respiratory problems.  Aim for a slow, steady weight loss. Even a small amount of weight loss can lower your risk of health problems. How to lose weight safely:  A safe and healthy way to lose weight is to eat fewer calories and get regular exercise. You can lose up about 1 pound a week by decreasing the number of calories you eat by 500 calories each day. Healthy meal plan for weight management:  A healthy meal plan includes a variety of foods, contains fewer calories, and helps you stay healthy. A healthy meal plan includes the following:  · Eat whole-grain foods more often. A healthy meal plan should contain fiber. Fiber is the part of grains, fruits, and vegetables that is not broken down by your body. Whole-grain foods are healthy and provide extra fiber in your diet. Some examples of whole-grain foods are whole-wheat breads and pastas, oatmeal, brown rice, and bulgur. · Eat a variety of vegetables every day. Include dark, leafy greens such as spinach, kale, james greens, and mustard greens. Eat yellow and orange vegetables such as carrots, sweet potatoes, and winter squash. · Eat a variety of fruits every day. Choose fresh or canned fruit (canned in its own juice or light syrup) instead of juice. Fruit juice has very little or no fiber. · Eat low-fat dairy foods. Drink fat-free (skim) milk or 1% milk. Eat fat-free yogurt and low-fat cottage cheese. Try low-fat cheeses such as mozzarella and other reduced-fat cheeses. · Choose meat and other protein foods that are low in fat. Choose beans or other legumes such as split peas or lentils. Choose fish, skinless poultry (chicken or turkey), or lean cuts of red meat (beef or pork). Before you cook meat or poultry, cut off any visible fat. · Use less fat and oil. Try baking foods instead of frying them. Add less fat, such as margarine, sour cream, regular salad dressing and mayonnaise to foods. Eat fewer high-fat foods.  Some examples of high-fat foods include french fries, doughnuts, ice cream, and cakes. · Eat fewer sweets. Limit foods and drinks that are high in sugar. This includes candy, cookies, regular soda, and sweetened drinks. Exercise:  Exercise at least 30 minutes per day on most days of the week. Some examples of exercise include walking, biking, dancing, and swimming. You can also fit in more physical activity by taking the stairs instead of the elevator or parking farther away from stores. Ask your healthcare provider about the best exercise plan for you. © Copyright [employer] 2018 Information is for End User's use only and may not be sold, redistributed or otherwise used for commercial purposes.  All illustrations and images included in CareNotes® are the copyrighted property of [employer] or [address] [place] [address]

## 2023-09-14 NOTE — ASSESSMENT & PLAN NOTE
Lipid panel with elevated LDL and total cholesterol, agreeable to starting statin today, start Atorvastatin 20 mg daily. Recheck CMP in 3 months.

## 2023-09-14 NOTE — PROGRESS NOTES
Assessment and Plan:     Problem List Items Addressed This Visit        Cardiovascular and Mediastinum    Benign essential HTN     Well controlled off medications. Other    Hyperlipidemia     Lipid panel with elevated LDL and total cholesterol, agreeable to starting statin today, start Atorvastatin 20 mg daily. Recheck CMP in 3 months. Relevant Medications    atorvastatin (LIPITOR) 20 mg tablet    Other Relevant Orders    Comprehensive metabolic panel    Erectile dysfunction    Relevant Medications    sildenafil (VIAGRA) 100 mg tablet   Other Visit Diagnoses     Medicare annual wellness visit, subsequent    -  Primary    Abnormal celiac antibody panel        Relevant Orders    Ambulatory Referral to Gastroenterology           Preventive health issues were discussed with patient, and age appropriate screening tests were ordered as noted in patient's After Visit Summary. Personalized health advice and appropriate referrals for health education or preventive services given if needed, as noted in patient's After Visit Summary. 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 Was referred to GI but did not see them, referral placed on 7/19/23. Last colonoscopy with [facility] in the last 6-7 years. Will send care gap request but he believes he is due now. Lipid panel reviewed - , has not been walking like he usually does due to heat. Agreeable to starting a statin. Needs Viagra refill, would like it sent to [facility].              Patient Care Team:  Helene Magallon DO as PCP - General (Family Medicine)  Sepideh Ballard MD     Review of Systems:     Review of Systems as in HPI     Problem List:     Patient Active Problem List   Diagnosis   • Acid reflux disease   • GERD with apnea   • Hyperlipidemia   • Erectile dysfunction   • Allergic cough   • Benign prostatic hyperplasia with urinary frequency   • Inflammatory arthritis   • Rotator cuff arthropathy of right shoulder   • Benign essential HTN   • Edema   • Elevated serum creatinine   • Acute adjustment disorder with anxiety   • Rash   • Psoriasis      Past Medical and Surgical History:     Past Medical History:   Diagnosis Date   • Abnormal ECG     Last Assessed 2/29/2016   • CAP (community acquired pneumonia) 10/12/2016   • COVID-19 8/11/2021   • Lyme disease 6/21/2021     Past Surgical History:   Procedure Laterality Date   • INGUINAL HERNIA REPAIR        Family History:     Family History   Problem Relation Age of Onset   • Cancer Family    • Colon cancer Father    • COPD Father       Social History:     Social History     Socioeconomic History   • Marital status:      Spouse name: None   • Number of children: None   • Years of education: None   • Highest education level: None   Occupational History   • None   Tobacco Use   • Smoking status: Former   • Smokeless tobacco: Current     Types: Chew   Vaping Use   • Vaping Use: Never used   Substance and Sexual Activity   • Alcohol use: Yes     Comment: social   • Drug use: No   • Sexual activity: Yes     Partners: Female     Comment: Wife   Other Topics Concern   • None   Social History Narrative   • None     Social Determinants of Health     Financial Resource Strain: Low Risk  (9/14/2023)    Overall Financial Resource Strain (CARDIA)    • Difficulty of Paying Living Expenses: Not very hard   Food Insecurity: Not on file   Transportation Needs: No Transportation Needs (9/14/2023)    PRAPARE - Transportation    • Lack of Transportation (Medical): No    • Lack of Transportation (Non-Medical):  No   Physical Activity: Not on file   Stress: Not on file   Social Connections: Not on file   Intimate Partner Violence: Not on file   Housing Stability: Not on file      Medications and Allergies:     Current Outpatient Medications   Medication Sig Dispense Refill   • atorvastatin (LIPITOR) 20 mg tablet Take 1 tablet (20 mg total) by mouth daily 30 tablet 5   • clotrimazole (LOTRIMIN) 1 % cream Apply topically 2 (two) times a day 30 g 0   • sildenafil (VIAGRA) 100 mg tablet Take 1 tablet (100 mg total) by mouth daily as needed for erectile dysfunction 30 tablet 0   • sulfaSALAzine (AZULFIDINE) 500 mg tablet Take 1 tablet (500 mg total) by mouth 2 (two) times a day 180 tablet 1     No current facility-administered medications for this visit. No Known Allergies   Immunizations:     Immunization History   Administered Date(s) Administered   • COVID-19 MODERNA VACC 0.5 ML IM 03/04/2021, 04/01/2021, 11/24/2021, 08/05/2022   • COVID-19 Moderna Vac BIVALENT 12 Yr+ IM 0.5 ML 03/17/2023   • INFLUENZA 11/18/2013, 11/30/2016, 10/19/2021, 03/17/2023   • Influenza, injectable, quadrivalent, preservative free 0.5 mL 03/05/2020   • Pneumococcal Conjugate Vaccine 20-valent (Pcv20), Polysace 04/13/2022   • Tdap 02/19/2015   • Zoster Vaccine Recombinant 03/27/2019, 07/26/2019      Health Maintenance:         Topic Date Due   • Colorectal Cancer Screening  Never done   • Hepatitis C Screening  Completed         Topic Date Due   • COVID-19 Vaccine (6 - Moderna series) 07/17/2023   • Influenza Vaccine (1) 09/01/2023      Medicare Screening Tests and Risk Assessments:     Lisa Mustafa is here for his Subsequent Wellness visit. Health Risk Assessment:   Patient rates overall health as good. Patient feels that their physical health rating is same. Patient is satisfied with their life. Eyesight was rated as same. Hearing was rated as same. Patient feels that their emotional and mental health rating is same. Patients states they are never, rarely angry. Patient states they are sometimes unusually tired/fatigued. Pain experienced in the last 7 days has been some. Patient's pain rating has been 3/10. Patient states that he has experienced no weight loss or gain in last 6 months.      Fall Risk Screening: In the past year, patient has experienced: history of falling in past year    Number of falls: 2 or more  Injured during fall?: No    Feels unsteady when standing or walking?: No    Worried about falling?: No      Home Safety:  Patient has trouble with stairs inside or outside of their home. Patient has working smoke alarms and has working carbon monoxide detector. Home safety hazards include: none. Nutrition:   Current diet is Regular. Patient watch what he eats     Medications:   Patient is currently taking over-the-counter supplements. OTC medications include: see medication list. Patient is able to manage medications. Activities of Daily Living (ADLs)/Instrumental Activities of Daily Living (IADLs):   Walk and transfer into and out of bed and chair?: Yes  Dress and groom yourself?: Yes    Bathe or shower yourself?: Yes    Feed yourself? Yes  Do your laundry/housekeeping?: Yes  Manage your money, pay your bills and track your expenses?: Yes  Make your own meals?: Yes    Do your own shopping?: Yes    Previous Hospitalizations:   Any hospitalizations or ED visits within the last 12 months?: No      Advance Care Planning:   Living will: Yes    Advanced directive: Yes      PREVENTIVE SCREENINGS      Cardiovascular Screening:    General: Screening Not Indicated and History Lipid Disorder      Diabetes Screening:     General: Screening Current      Prostate Cancer Screening:    General: Screening Current      Abdominal Aortic Aneurysm (AAA) Screening:    Risk factors include: age between 70-75 yo and tobacco use        Lung Cancer Screening:     General: Screening Not Indicated      Hepatitis C Screening:    General: Screening Current    Screening, Brief Intervention, and Referral to Treatment (SBIRT)    Screening  Typical number of drinks in a day: 0  Typical number of drinks in a week: 0  Interpretation: Low risk drinking behavior.     Single Item Drug Screening:  How often have you used an illegal drug (including marijuana) or a prescription medication for non-medical reasons in the past year? never    Single Item Drug Screen Score: 0  Interpretation: Negative screen for possible drug use disorder    No results found. Physical Exam:     /82   Pulse 68   Temp 97.8 °F (36.6 °C)   Resp 18   Ht 5' 7" (1.702 m)   Wt 97.1 kg (214 lb 2 oz)   SpO2 97%   BMI 33.54 kg/m²     Physical Exam  Vitals reviewed. Constitutional:       Appearance: Normal appearance. HENT:      Head: Normocephalic. Right Ear: External ear normal.      Left Ear: External ear normal.      Nose: Nose normal.   Eyes:      Extraocular Movements: Extraocular movements intact. Conjunctiva/sclera: Conjunctivae normal.   Cardiovascular:      Rate and Rhythm: Normal rate. Pulmonary:      Effort: Pulmonary effort is normal.   Abdominal:      General: Abdomen is flat. Skin:     General: Skin is warm and dry. Neurological:      Mental Status: He is alert.    Psychiatric:         Mood and Affect: Mood normal.         Behavior: Behavior normal.          Monique Black DO

## 2023-09-19 NOTE — ASSESSMENT & PLAN NOTE
No protocol for requested medication     Medication: tramadol  Last office visit date: 6/29/23  Pharmacy: [facility] #[id number] - [place], [place] - [id number]    Order pended, routed to clinician for review.      Take omeprazole while using prednisone

## 2023-10-09 DIAGNOSIS — E78.5 HYPERLIPIDEMIA, UNSPECIFIED HYPERLIPIDEMIA TYPE: ICD-10-CM

## 2023-10-09 RX ORDER — ATORVASTATIN CALCIUM 20 MG/1
20 TABLET, FILM COATED ORAL DAILY
Qty: 90 TABLET | Refills: 2 | Status: SHIPPED | OUTPATIENT
Start: 2023-10-09

## 2023-11-10 ENCOUNTER — TELEPHONE (OUTPATIENT)
Age: 68
End: 2023-11-10

## 2023-11-10 ENCOUNTER — OFFICE VISIT (OUTPATIENT)
Dept: GASTROENTEROLOGY | Facility: CLINIC | Age: 68
End: 2023-11-10
Payer: MEDICARE

## 2023-11-10 ENCOUNTER — TELEPHONE (OUTPATIENT)
Dept: GASTROENTEROLOGY | Facility: CLINIC | Age: 68
End: 2023-11-10

## 2023-11-10 VITALS
TEMPERATURE: 98.1 F | DIASTOLIC BLOOD PRESSURE: 82 MMHG | SYSTOLIC BLOOD PRESSURE: 130 MMHG | BODY MASS INDEX: 32.99 KG/M2 | WEIGHT: 210.2 LBS | HEIGHT: 67 IN

## 2023-11-10 DIAGNOSIS — L13.0 DERMATITIS HERPETIFORMIS: ICD-10-CM

## 2023-11-10 DIAGNOSIS — Z12.11 COLON CANCER SCREENING: Primary | ICD-10-CM

## 2023-11-10 DIAGNOSIS — R89.4 ABNORMAL CELIAC ANTIBODY PANEL: ICD-10-CM

## 2023-11-10 DIAGNOSIS — Z80.0 FAMILY HISTORY OF COLON CANCER: ICD-10-CM

## 2023-11-10 DIAGNOSIS — Z12.11 COLON CANCER SCREENING: ICD-10-CM

## 2023-11-10 PROCEDURE — 99204 OFFICE O/P NEW MOD 45 MIN: CPT | Performed by: INTERNAL MEDICINE

## 2023-11-10 RX ORDER — SODIUM PICOSULFATE, MAGNESIUM OXIDE, AND ANHYDROUS CITRIC ACID 12; 3.5; 1 G/175ML; G/175ML; MG/175ML
LIQUID ORAL
Qty: 350 ML | Refills: 0 | Status: SHIPPED | OUTPATIENT
Start: 2023-11-10

## 2023-11-10 NOTE — PATIENT INSTRUCTIONS
Scheduled date of EGD(as of today):11.13.23  Physician performing EGD:DR LEON  Location of EGD:[facility]  Instructions reviewed with patient by:SCAR  Clearances: N/A  ok'd by Guerrero Vaughn     Scheduled date of colonoscopy (as of today):01.03.24  Physician performing colonoscopy:DR LEON  Location of colonoscopy:[facility]  Bowel prep reviewed with patient:CLENPIQ  Instructions reviewed with patient by:SCAR  Clearances: N/A

## 2023-11-10 NOTE — TELEPHONE ENCOUNTER
PA submitted for clenpiq via [facility] Key: [id number]. Clinical questions answered, chart notes sent. Awaiting determination.

## 2023-11-10 NOTE — PROGRESS NOTES
[facility] - Outpatient Consultation  Terrie Tovar 80 y.o. male MRN: [id number]  Encounter: [id number]          ASSESSMENT AND PLAN:      1. Abnormal celiac antibody panel  2. Dermatitis herpetiformis  3. Colon cancer screening  4. Family history of colon cancer      Patient has characteristic rash of dermatitis liver deformities. He has abnormal celiac panel. This is consistent with diagnosis of celiac disease. He is at risk for small bowel cancer. He is agreeable to get EGD done. Procedure ordered. Recommend strict gluten-free diet after procedure performed. He is due for colonoscopy. High risk of colon cancer given family history in his father. He is agreeable to get colonoscopy done which was ordered along with prep today. RTC 4 months. Javier Pavon MD  Gastroenterology  [facility]  Date: November 10, 2023    - EGD; Future  - Colonoscopy; Future  - sodium picosulfate, magnesium oxide, citric acid (Clenpiq) oral solution; Take 160 mL by mouth 2 (two) times a day for 1 day Please take 160 mL (1 bottle) the evening before colonoscopy (5:00 p.m. To 9:00 p.m.) followed by a 2nd bottle about 5 hours before colonoscopy. Dispense: 320 mL; Refill: 0    ______________________________________________________________________    HPI: 41-year-old with hyperlipidemia, abnormal celiac antibody panel presents for evaluation. Patient reports that he had rash in multiple areas of the body which she described as bumps and was itchy. This started in January. Initially was prescribed steroids and other treatments. Ultimately in July 2023 he had labs done which were reviewed by me today which showed multiple celiac related antibodies being positive including tissue transglutaminase IgA gliadin IgA antibodies. Since then he has been avoiding gluten in diet as much as possible. He gets occasional exposure to gluten.   His rash has mostly resolved but still present in certain areas. No other GI symptoms. He denies any nausea, vomiting, abdominal pain. No abnormal bowel movements. No blood in stools. His father had colon cancer. No family history of celiac disease. He had a colonoscopy years ago. No records of the procedure in the chart review today. Occasional alcohol intake and cigar smoking. Other labs done in July were reviewed by me today which showed normal blood counts, liver and renal function. CRP was elevated. TSH levels are normal.      REVIEW OF SYSTEMS:    CONSTITUTIONAL: Denies any fever, chills, rigors, and weight loss. HEENT: No earache or tinnitus. Denies hearing loss or visual disturbances. CARDIOVASCULAR: No chest pain or palpitations. RESPIRATORY: Denies any cough, hemoptysis, shortness of breath or dyspnea on exertion. GASTROINTESTINAL: As noted in the History of Present Illness. GENITOURINARY: No problems with urination. Denies any hematuria or dysuria. NEUROLOGIC: No dizziness or vertigo, denies headaches. MUSCULOSKELETAL: Denies any muscle or joint pain. SKIN: Denies skin rashes or itching. ENDOCRINE: Denies excessive thirst. Denies intolerance to heat or cold. PSYCHOSOCIAL: Denies depression or anxiety. Denies any recent memory loss.        Historical Information   Past Medical History:   Diagnosis Date    Abnormal ECG     Last Assessed 2/29/2016    CAP (community acquired pneumonia) 10/12/2016    COVID-19 8/11/2021    Lyme disease 6/21/2021     Past Surgical History:   Procedure Laterality Date    COLONOSCOPY      INGUINAL HERNIA REPAIR       Social History   Social History     Substance and Sexual Activity   Alcohol Use Yes    Comment: social     Social History     Substance and Sexual Activity   Drug Use No     Social History     Tobacco Use   Smoking Status Former   Smokeless Tobacco Current    Types: Chew     Family History   Problem Relation Age of Onset    Cancer Family     Colon cancer Father     COPD Father        Meds/Allergies       Current Outpatient Medications:     atorvastatin (LIPITOR) 20 mg tablet    sildenafil (VIAGRA) 100 mg tablet    sodium picosulfate, magnesium oxide, citric acid (Clenpiq) oral solution    sulfaSALAzine (AZULFIDINE) 500 mg tablet    clotrimazole (LOTRIMIN) 1 % cream    No Known Allergies        Objective     Blood pressure 130/82, temperature 98.1 °F (36.7 °C), height 5' 7" (1.702 m), weight 95.3 kg (210 lb 3.2 oz). Body mass index is 32.92 kg/m². PHYSICAL EXAM:      General Appearance:   Alert, cooperative, no distress   HEENT:   Normocephalic, atraumatic, anicteric. Neck:  Supple, symmetrical, trachea midline   Lungs:   Clear to auscultation bilaterally; no rales, rhonchi or wheezing; respirations unlabored    Heart[de-identified]   Regular rate and rhythm; no murmur, rub, or gallop. Abdomen:   Soft, non-tender, non-distended; normal bowel sounds; no masses, no organomegaly    Genitalia:   Deferred    Rectal:   Deferred    Extremities:  No cyanosis, clubbing or edema    Pulses:  2+ and symmetric    Skin:  No jaundice, rashes, or lesions    Lymph nodes:  No palpable cervical lymphadenopathy        Lab Results:   No visits with results within 1 Day(s) from this visit.    Latest known visit with results is:   Appointment on 09/08/2023   Component Date Value    WBC 09/08/2023 9.01     RBC 09/08/2023 5.18     Hemoglobin 09/08/2023 16.1     Hematocrit 09/08/2023 50.1 (H)     MCV 09/08/2023 97     MCH 09/08/2023 31.1     MCHC 09/08/2023 32.1     RDW 09/08/2023 14.4     MPV 09/08/2023 11.5     Platelets 49/37/2177 244     nRBC 09/08/2023 0     Neutrophils Relative 09/08/2023 52     Immat GRANS % 09/08/2023 0     Lymphocytes Relative 09/08/2023 32     Monocytes Relative 09/08/2023 10     Eosinophils Relative 09/08/2023 5     Basophils Relative 09/08/2023 1     Neutrophils Absolute 09/08/2023 4.64     Immature Grans Absolute 09/08/2023 0.04     Lymphocytes Absolute 09/08/2023 2.90 Monocytes Absolute 09/08/2023 0.92     Eosinophils Absolute 09/08/2023 0.43     Basophils Absolute 09/08/2023 0.08     Sodium 09/08/2023 139     Potassium 09/08/2023 4.3     Chloride 09/08/2023 102     CO2 09/08/2023 27     ANION GAP 09/08/2023 10     BUN 09/08/2023 13     Creatinine 09/08/2023 1.14     Glucose, Fasting 09/08/2023 108 (H)     Calcium 09/08/2023 9.3     AST 09/08/2023 22     ALT 09/08/2023 20     Alkaline Phosphatase 09/08/2023 65     Total Protein 09/08/2023 6.8     Albumin 09/08/2023 4.1     Total Bilirubin 09/08/2023 0.82     eGFR 09/08/2023 66     CRP 09/08/2023 7.4 (H)     Sed Rate 09/08/2023 19     PSA, Diagnostic 09/08/2023 1.60          Radiology Results:   No results found.

## 2023-11-27 ENCOUNTER — TELEPHONE (OUTPATIENT)
Dept: GASTROENTEROLOGY | Facility: CLINIC | Age: 68
End: 2023-11-27

## 2023-12-18 ENCOUNTER — TELEPHONE (OUTPATIENT)
Dept: GASTROENTEROLOGY | Facility: CLINIC | Age: 68
End: 2023-12-18

## 2023-12-18 DIAGNOSIS — Z12.11 COLON CANCER SCREENING: ICD-10-CM

## 2023-12-18 RX ORDER — SODIUM PICOSULFATE, MAGNESIUM OXIDE, AND ANHYDROUS CITRIC ACID 12; 3.5; 1 G/175ML; G/175ML; MG/175ML
LIQUID ORAL
Qty: 350 ML | Refills: 0 | Status: SHIPPED | OUTPATIENT
Start: 2023-12-18

## 2024-01-02 RX ORDER — SODIUM CHLORIDE 9 MG/ML
125 INJECTION, SOLUTION INTRAVENOUS CONTINUOUS
Status: CANCELLED | OUTPATIENT
Start: 2024-01-02

## 2024-01-02 RX ORDER — ALBUTEROL SULFATE 2.5 MG/3ML
2.5 SOLUTION RESPIRATORY (INHALATION) ONCE AS NEEDED
Status: CANCELLED | OUTPATIENT
Start: 2024-01-02

## 2024-01-02 RX ORDER — ONDANSETRON 2 MG/ML
4 INJECTION INTRAMUSCULAR; INTRAVENOUS ONCE AS NEEDED
Status: CANCELLED | OUTPATIENT
Start: 2024-01-02

## 2024-01-03 ENCOUNTER — HOSPITAL ENCOUNTER (OUTPATIENT)
Dept: GASTROENTEROLOGY | Facility: MEDICAL CENTER | Age: 69
Setting detail: OUTPATIENT SURGERY
Discharge: HOME | End: 2024-01-03
Payer: MEDICARE

## 2024-01-03 ENCOUNTER — ANESTHESIA EVENT (OUTPATIENT)
Dept: GASTROENTEROLOGY | Facility: MEDICAL CENTER | Age: 69
End: 2024-01-03

## 2024-01-03 ENCOUNTER — ANESTHESIA (OUTPATIENT)
Dept: GASTROENTEROLOGY | Facility: MEDICAL CENTER | Age: 69
End: 2024-01-03

## 2024-01-03 VITALS
TEMPERATURE: 97.6 F | OXYGEN SATURATION: 97 % | HEIGHT: 67 IN | SYSTOLIC BLOOD PRESSURE: 110 MMHG | HEART RATE: 79 BPM | RESPIRATION RATE: 18 BRPM | WEIGHT: 205 LBS | DIASTOLIC BLOOD PRESSURE: 61 MMHG | BODY MASS INDEX: 32.18 KG/M2

## 2024-01-03 DIAGNOSIS — Z12.11 COLON CANCER SCREENING: ICD-10-CM

## 2024-01-03 DIAGNOSIS — K20.90 ESOPHAGITIS: Primary | ICD-10-CM

## 2024-01-03 DIAGNOSIS — R89.4 ABNORMAL CELIAC ANTIBODY PANEL: ICD-10-CM

## 2024-01-03 PROCEDURE — 45385 COLONOSCOPY W/LESION REMOVAL: CPT | Performed by: INTERNAL MEDICINE

## 2024-01-03 PROCEDURE — 43239 EGD BIOPSY SINGLE/MULTIPLE: CPT | Performed by: INTERNAL MEDICINE

## 2024-01-03 PROCEDURE — 88305 TISSUE EXAM BY PATHOLOGIST: CPT | Performed by: STUDENT IN AN ORGANIZED HEALTH CARE EDUCATION/TRAINING PROGRAM

## 2024-01-03 RX ORDER — ALBUTEROL SULFATE 2.5 MG/3ML
2.5 SOLUTION RESPIRATORY (INHALATION) ONCE AS NEEDED
Status: DISCONTINUED | OUTPATIENT
Start: 2024-01-03 | End: 2024-01-07 | Stop reason: HOSPADM

## 2024-01-03 RX ORDER — LIDOCAINE HYDROCHLORIDE 20 MG/ML
INJECTION, SOLUTION EPIDURAL; INFILTRATION; INTRACAUDAL; PERINEURAL AS NEEDED
Status: DISCONTINUED | OUTPATIENT
Start: 2024-01-03 | End: 2024-01-03

## 2024-01-03 RX ORDER — OMEPRAZOLE 40 MG/1
40 CAPSULE, DELAYED RELEASE ORAL DAILY
Qty: 30 CAPSULE | Refills: 5 | Status: SHIPPED | OUTPATIENT
Start: 2024-01-03 | End: 2024-07-01

## 2024-01-03 RX ORDER — SODIUM CHLORIDE 9 MG/ML
125 INJECTION, SOLUTION INTRAVENOUS CONTINUOUS
Status: DISCONTINUED | OUTPATIENT
Start: 2024-01-03 | End: 2024-01-07 | Stop reason: HOSPADM

## 2024-01-03 RX ORDER — ONDANSETRON 2 MG/ML
4 INJECTION INTRAMUSCULAR; INTRAVENOUS ONCE AS NEEDED
Status: DISCONTINUED | OUTPATIENT
Start: 2024-01-03 | End: 2024-01-07 | Stop reason: HOSPADM

## 2024-01-03 RX ORDER — PROPOFOL 10 MG/ML
INJECTION, EMULSION INTRAVENOUS AS NEEDED
Status: DISCONTINUED | OUTPATIENT
Start: 2024-01-03 | End: 2024-01-03

## 2024-01-03 RX ADMIN — SODIUM CHLORIDE 125 ML/HR: 0.9 INJECTION, SOLUTION INTRAVENOUS at 08:56

## 2024-01-03 RX ADMIN — PROPOFOL 40 MG: 10 INJECTION, EMULSION INTRAVENOUS at 09:14

## 2024-01-03 RX ADMIN — PROPOFOL 40 MG: 10 INJECTION, EMULSION INTRAVENOUS at 09:18

## 2024-01-03 RX ADMIN — Medication 40 MG: at 09:19

## 2024-01-03 RX ADMIN — PROPOFOL 40 MG: 10 INJECTION, EMULSION INTRAVENOUS at 09:16

## 2024-01-03 RX ADMIN — PROPOFOL 50 MG: 10 INJECTION, EMULSION INTRAVENOUS at 09:08

## 2024-01-03 RX ADMIN — PROPOFOL 40 MG: 10 INJECTION, EMULSION INTRAVENOUS at 09:23

## 2024-01-03 RX ADMIN — LIDOCAINE HYDROCHLORIDE 100 MG: 20 INJECTION, SOLUTION EPIDURAL; INFILTRATION; INTRACAUDAL at 09:04

## 2024-01-03 RX ADMIN — PROPOFOL 180 MG: 10 INJECTION, EMULSION INTRAVENOUS at 09:04

## 2024-01-03 RX ADMIN — PROPOFOL 50 MG: 10 INJECTION, EMULSION INTRAVENOUS at 09:10

## 2024-01-03 RX ADMIN — PROPOFOL 40 MG: 10 INJECTION, EMULSION INTRAVENOUS at 09:25

## 2024-01-03 RX ADMIN — PROPOFOL 40 MG: 10 INJECTION, EMULSION INTRAVENOUS at 09:20

## 2024-01-03 RX ADMIN — PROPOFOL 40 MG: 10 INJECTION, EMULSION INTRAVENOUS at 09:12

## 2024-01-03 RX ADMIN — PROPOFOL 50 MG: 10 INJECTION, EMULSION INTRAVENOUS at 09:06

## 2024-01-03 NOTE — ANESTHESIA PREPROCEDURE EVALUATION
Procedure:  COLONOSCOPY  EGD    Relevant Problems   CARDIO   (+) Benign essential HTN   (+) Hyperlipidemia      GI/HEPATIC   (+) Acid reflux disease                      Physical Exam    Airway    Mallampati score: III  TM Distance: >3 FB  Neck ROM: full     Dental       Cardiovascular  Rhythm: regular    Pulmonary   Breath sounds clear to auscultation    Other Findings        Anesthesia Plan  ASA Score- 2     Anesthesia Type- IV sedation with anesthesia with ASA Monitors.         Additional Monitors:     Airway Plan:            Plan Factors-Exercise tolerance (METS): >4 METS.    Chart reviewed.   Existing labs reviewed.                   Induction- intravenous.    Postoperative Plan-     Informed Consent- Anesthetic plan and risks discussed with patient.

## 2024-01-03 NOTE — H&P
History and Physical -  [facility]  Wojciech Levy 68 y.o. male MRN: [id number]                  HPI: Wojciech Levy is a 68 y.o. year old male who presents for EGD due to abnormal celiac panel and rash from gluten exposure (dermatitis herpetiformis) along with colonoscopy for family history of colon cancer.      REVIEW OF SYSTEMS: Per the HPI, and otherwise unremarkable.    Historical Information   Past Medical History:   Diagnosis Date    Abnormal ECG     Last Assessed 2/29/2016    CAP (community acquired pneumonia) 10/12/2016    COVID-19 8/11/2021    Lyme disease 6/21/2021     Past Surgical History:   Procedure Laterality Date    COLONOSCOPY      INGUINAL HERNIA REPAIR       Social History   Social History     Substance and Sexual Activity   Alcohol Use Yes    Comment: social     Social History     Substance and Sexual Activity   Drug Use No     Social History     Tobacco Use   Smoking Status Former   Smokeless Tobacco Current    Types: Chew     Family History   Problem Relation Age of Onset    Cancer Family     Colon cancer Father     COPD Father        Meds/Allergies     (Not in a hospital admission)      No Known Allergies    Objective     Blood pressure 160/92, pulse 76, temperature 97.6 °F (36.4 °C), temperature source Temporal, resp. rate 18, SpO2 98%.      PHYSICAL EXAM    Gen: NAD  CV: RRR  CHEST: Clear  ABD: soft, NT/ND  EXT: no edema      ASSESSMENT/PLAN:  Wojciech Levy is a 68 y.o. year old male who presents for EGD due to abnormal celiac panel and rash from gluten exposure (dermatitis herpetiformis) along with colonoscopy for family history of colon cancer. The patient is stable and optimized for the procedure, we reviewed risk and benefits. Risk include but not limited to infection, bleeding, perforation and missing a lesion.

## 2024-01-03 NOTE — ANESTHESIA POSTPROCEDURE EVALUATION
"Post-Op Assessment Note    CV Status:  Stable    Pain management: adequate       Mental Status:  Alert and awake   Hydration Status:  Euvolemic   PONV Controlled:  Controlled   Airway Patency:  Patent     Post Op Vitals Reviewed: Yes      Staff: Anesthesiologist               BP      Temp      Pulse     Resp      SpO2      /61   Pulse 79   Temp 97.6 °F (36.4 °C) (Temporal)   Resp 18   Ht 5' 7\" (1.702 m)   Wt 93 kg (205 lb)   SpO2 97%   BMI 32.11 kg/m²     "

## 2024-01-05 PROCEDURE — 88305 TISSUE EXAM BY PATHOLOGIST: CPT | Performed by: STUDENT IN AN ORGANIZED HEALTH CARE EDUCATION/TRAINING PROGRAM

## 2024-01-07 DIAGNOSIS — M47.819 PERIPHERAL SPONDYLOARTHRITIS: ICD-10-CM

## 2024-01-07 RX ORDER — SULFASALAZINE 500 MG/1
500 TABLET ORAL 2 TIMES DAILY
Qty: 180 TABLET | Refills: 1 | Status: SHIPPED | OUTPATIENT
Start: 2024-01-07

## 2024-01-30 DIAGNOSIS — K20.90 ESOPHAGITIS: ICD-10-CM

## 2024-01-30 RX ORDER — OMEPRAZOLE 40 MG/1
40 CAPSULE, DELAYED RELEASE ORAL DAILY
Qty: 90 CAPSULE | Refills: 1 | Status: SHIPPED | OUTPATIENT
Start: 2024-01-30 | End: 2024-07-28

## 2024-04-10 ENCOUNTER — OFFICE VISIT (OUTPATIENT)
Dept: FAMILY MEDICINE CLINIC | Facility: CLINIC | Age: 69
End: 2024-04-10

## 2024-04-10 VITALS
OXYGEN SATURATION: 98 % | WEIGHT: 199.2 LBS | HEIGHT: 67 IN | BODY MASS INDEX: 31.27 KG/M2 | DIASTOLIC BLOOD PRESSURE: 70 MMHG | TEMPERATURE: 97.8 F | RESPIRATION RATE: 16 BRPM | SYSTOLIC BLOOD PRESSURE: 114 MMHG | HEART RATE: 68 BPM

## 2024-04-10 DIAGNOSIS — R21 RASH: Primary | ICD-10-CM

## 2024-04-10 DIAGNOSIS — Z78.9 GLUTEN FREE DIET: ICD-10-CM

## 2024-04-10 RX ORDER — PREDNISONE 10 MG/1
TABLET ORAL
Qty: 30 TABLET | Refills: 0 | Status: SHIPPED | OUTPATIENT
Start: 2024-04-10

## 2024-04-10 NOTE — PROGRESS NOTES
"   [facility] PRACTICE OFFICE VISIT       NAME: Wojciech Leyv  AGE: 68 y.o. SEX: male       : 1955        MRN: [id number]    Assessment and Plan   1. 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   Had abnormal celiac panel. EGD in 2024 was negative for celiac disease, but he self discontinued gluten from his diet 5 months prior to EGD.   Continue to recommend dermatology evaluation. He was given 3 local dermatologists that he can choose to call for an appointment.   He requests prednisone taper today, as it gives him some relief. We discussed this is not curative, and prednisone does have risk for side effects, he understands and would like to proceed.   -     Ambulatory Referral to Dermatology; Future  -     predniSONE 10 mg tablet; Take 4 tablets for 3 days, 3 tablets for 3 days, 2 tablets for 3 days, 1 tablet for 3 days.    2. Gluten free diet  Recommend he meet with a dietician to review how to be completely gluten free and how to look for hidden gluten in foods and topical products.   -     Ambulatory Referral to Nutrition Services; Future           Chief Complaint     Chief Complaint   Patient presents with    Rash     X2/3 weeks shoulders & arm, painful & itchy        History of Present Illness     Wojciech Levy is a 68 year old male presenting today for rash.     Has had this rash since 2023.    Rash is suspected dermatitis herpetiformis, had abnormal celiac panel.   He stopped consuming to his knowledge in August.   Though does eat out still.   Does not watch out for gluten free skin products.   Had an EGD in 2024, which was negative for celiac disease, though he eliminated gluten from his diet 5 months prior.     He has not yet scheduled with a dermatologist.   He has not met with a nutritionist to learn about gluten free.    Has used cortisone 10 and gold bond powder.     Itches, sometimes painful rash.     No chills, sweats, or feeling ill.     Chronic joint pain, \"nothing " "new.\"                 Review of Systems   Review of Systems   Constitutional: Negative.    Skin:  Positive for rash.       I have reviewed the patient's medical history in detail; there are no changes to the history as noted in the electronic medical record.    Objective     Vitals:    04/10/24 1108   BP: 114/70   BP Location: Right arm   Patient Position: Sitting   Cuff Size: Standard   Pulse: 68   Resp: 16   Temp: 97.8 °F (36.6 °C)   TempSrc: Temporal   SpO2: 98%   Weight: 90.4 kg (199 lb 3.2 oz)   Height: 5' 7\" (1.702 m)     Wt Readings from Last 3 Encounters:   04/10/24 90.4 kg (199 lb 3.2 oz)   01/03/24 93 kg (205 lb)   11/10/23 95.3 kg (210 lb 3.2 oz)     Physical Exam  Vitals and nursing note reviewed.   Constitutional:       Appearance: Normal appearance.   Skin:     Findings: Rash present.      Comments: Rash as noted in diagram, back, wrapping around towards abdomen, buttocks, and extensor surface of knees.    Neurological:      Mental Status: He is alert and oriented to person, place, and time.         Depression Screening and Follow-up Plan: Patient was screened for depression during today's encounter. They screened negative with a PHQ-2 score of 0.        ALLERGIES:  No Known Allergies    Current Medications     Current Outpatient Medications   Medication Sig Dispense Refill    atorvastatin (LIPITOR) 20 mg tablet TAKE 1 TABLET BY MOUTH EVERY DAY 90 tablet 2    omeprazole (PriLOSEC) 40 MG capsule TAKE 1 CAPSULE (40 MG TOTAL) BY MOUTH DAILY. 90 capsule 1    predniSONE 10 mg tablet Take 4 tablets for 3 days, 3 tablets for 3 days, 2 tablets for 3 days, 1 tablet for 3 days. 30 tablet 0    sildenafil (VIAGRA) 100 mg tablet Take 1 tablet (100 mg total) by mouth daily as needed for erectile dysfunction 30 tablet 0    sulfaSALAzine (AZULFIDINE) 500 mg tablet TAKE 1 TABLET BY MOUTH TWICE A  tablet 1     No current facility-administered medications for this visit.         Health Maintenance     Health " Maintenance   Topic Date Due    Falls: Plan of Care  Never done    COVID-19 Vaccine (6 - 2023-24 season) 09/01/2023    Medicare Annual Wellness Visit (AWV)  09/14/2024    Fall Risk  04/10/2025    Depression Screening  04/10/2025    Colorectal Cancer Screening  01/01/2029    Hepatitis C Screening  Completed    Zoster Vaccine  Completed    Pneumococcal Vaccine: 65+ Years  Completed    Influenza Vaccine  Completed    HIB Vaccine  Aged Out    IPV Vaccine  Aged Out    Hepatitis A Vaccine  Aged Out    Meningococcal ACWY Vaccine  Aged Out    HPV Vaccine  Aged Out     Immunization History   Administered Date(s) Administered    COVID-19 MODERNA VACC 0.5 ML IM 03/04/2021, 04/01/2021, 11/24/2021, 08/05/2022    COVID-19 Moderna Vac BIVALENT 12 Yr+ IM 0.5 ML 03/17/2023    INFLUENZA 11/18/2013, 11/30/2016, 10/19/2021, 03/17/2023, 09/27/2023    Influenza, injectable, quadrivalent, preservative free 0.5 mL 03/05/2020    Pneumococcal Conjugate Vaccine 15-valent (Pcv15), Polysace 09/27/2023    Pneumococcal Conjugate Vaccine 20-valent (Pcv20), Polysace 04/13/2022    Respiratory Syncytial Virus Vaccine (Recombinant, Adjuvanted) 11/14/2023    Tdap 02/19/2015    Zoster Vaccine Recombinant 03/27/2019, 07/26/2019       SONJA Her

## 2024-05-21 ENCOUNTER — CLINICAL SUPPORT (OUTPATIENT)
Dept: NUTRITION | Facility: HOSPITAL | Age: 69
End: 2024-05-21
Payer: MEDICARE

## 2024-05-21 VITALS — BODY MASS INDEX: 32.18 KG/M2 | WEIGHT: 205 LBS | HEIGHT: 67 IN

## 2024-05-21 DIAGNOSIS — Z78.9 GLUTEN FREE DIET: ICD-10-CM

## 2024-05-21 PROCEDURE — 97802 MEDICAL NUTRITION INDIV IN: CPT

## 2024-05-21 NOTE — PROGRESS NOTES
" Nutrition Assessment Form    Patient Name: Wojciech Levy    YOB: 1955    Sex: Male     Assessment Date: 5/21/2024  Start Time: 10:05 am Stop Time: 11:00 am  Total Minutes: 55 min     Data:  Present at session: self   Parent/Patient Concerns/reason for visit: \"Maybe you can help me out with the gluten\"   Medical Dx/Reason for Referral: Z78.9 (ICD-10-CM) - Gluten free diet  Provider: Artis Abdi MD    Past Medical History:   Diagnosis Date    Abnormal ECG     Last Assessed 2/29/2016    CAP (community acquired pneumonia) 10/12/2016    COVID-19 8/11/2021    Lyme disease 6/21/2021       Current Outpatient Medications   Medication Sig Dispense Refill    atorvastatin (LIPITOR) 20 mg tablet TAKE 1 TABLET BY MOUTH EVERY DAY 90 tablet 2    omeprazole (PriLOSEC) 40 MG capsule TAKE 1 CAPSULE (40 MG TOTAL) BY MOUTH DAILY. 90 capsule 1    predniSONE 10 mg tablet Take 4 tablets for 3 days, 3 tablets for 3 days, 2 tablets for 3 days, 1 tablet for 3 days. 30 tablet 0    sildenafil (VIAGRA) 100 mg tablet Take 1 tablet (100 mg total) by mouth daily as needed for erectile dysfunction 30 tablet 0    sulfaSALAzine (AZULFIDINE) 500 mg tablet TAKE 1 TABLET BY MOUTH TWICE A  tablet 1     No current facility-administered medications for this visit.        Additional Meds/Supplements: Vitamin D 1x/wk   Special Learning Needs/barriers to learning/any new barriers N/A   Height: 5'7\" HC Readings from Last 5 Encounters:   No data found for HC      Weight: 205# wanted to keep shoes on , jeans, short sleeved shirt Wt Readings from Last 10 Encounters:   04/10/24 90.4 kg (199 lb 3.2 oz)   01/03/24 93 kg (205 lb)   11/10/23 95.3 kg (210 lb 3.2 oz)   09/14/23 97.1 kg (214 lb 2 oz)   07/14/23 95.9 kg (211 lb 6.4 oz)   07/07/23 99.5 kg (219 lb 4 oz)   01/24/23 89.8 kg (198 lb)   04/13/22 92.1 kg (203 lb)   09/29/21 101 kg (223 lb)   08/16/21 99.8 kg (220 lb)     Estimated body mass index is 31.2 kg/m² as calculated from the " "following:    Height as of 4/10/24: 5' 7\" (1.702 m).    Weight as of 4/10/24: 90.4 kg (199 lb 3.2 oz).   Recent Weight Change: wt fluctuates []Yes     []No  Amount:       Energy Needs: Didn't estimate d/t not focusing on wt/intake   No Known Allergies or intolerances Gluten sensitivity   Social History     Substance and Sexual Activity   Alcohol Use Yes    Comment: social    Alcohol socially-avoids alcohol with    Social History     Tobacco Use   Smoking Status Former    Types: Cigars   Smokeless Tobacco Current    Types: Chew       Who shops? Pts daughter   Who cooks/cooking methods/Eating out/take out habits   Pts daughter=sometimes pt will     Exercise: ~1 hr/4 miles in the morning, was going 5-6 x/week, now less often, maybe 1-2x/week, 1 mile. Would like to start walking again.      Other: ie: Sleep habits/ stress level/ work habits household-lives with ?/ food security \"Never slept good my whole life\"  Low stress   Prior Nutritional Counseling? []Yes     [x]No  When:      Why:         Diet Hx:  Breakfast: often skips Coffee-black  If has breakfast: yogurt or hard boiled egg   Lunch: 12:00/12:30 pm Ham & cheese sandwich on gluten free bread  Or  Salad-chicken tiny, uses own dressing, doesn't eat  Or  Canned tuna fish (plain) on bed of lettuce       Dinner: Can chicken corn chowder for dinner (gluten free)  Or   Fish (2-3x/wk)       Snacks: PM blueberries, grapes, other fruit   Other Notes/ Initial Assessment:      05/21/24  Pt would like some advice & guidance on following a gluten free diet.  Pt's wife passed away a few years ago. Pt's daughter helps to shop & make meals. Pt prefers to grill so makes more   Pt's dr and dermatologist wanted pt to see a RD in relation to possible gluten allergy. EGD 01/03/24-biopsy negative for celiac. Pt states he was told that since he was on the gluten free diet that may be why EDG was negative. States went gluten free August 2023, skin got better, then skin got worse around " late fall/winter even though pt didn't stray from gluten free diet-thinks had some gluten over the holidays.     Skin issues started ~January 2023. Last July/August went to the dr for skin issues. Was put on steroids, didn't go away. Pt denies GI issues at this time. Did stated though that 5-6 years ago he had an issue where he was having somache aches and had a hard time eating or would throw up, lost 20#, pt was never able to get some answers as to why that happened, stated he gained the wt bacl.     Fluids: mainly water, occasionally powerade (regular). Pt feels he drinks at least a 1/2 gallon of fluids per day.    Pt feels satisfied with his diet other than missing bagels, [place] bread, hoagie roll.  Printed pt our recipes for 1 ingredient lentil tortillas & stephanie made with fat free plain greek yogurt and almond flour.  Also provided nutrition education on tips for antiinflammatory diet including maybe cutting back on red meat (~3x/week), mediterranean diet.      Follow up scheduled 08/27/24    Nutrition Diagnosis:   Altered Nutrition-Related Laboratory values  related to Gluten sensitivity, possible celiac disease as evidenced by abnormal celiac panel       Any change or new dx since previous visit:     Nutrition Diagnosis:         Medical Nutrition Therapy Intervention:  []Individualized Meal Plan []Understanding Lab Values   []Basic Pathophysiology of Disease []Food/Medication Interactions   []Food Diary [x]Exercise: recommend 150 min/week   [x]Lifestyle/Behavior Modification Techniques: gluten free, mediterrean diet, anti inflammatory []Medication, Mechanism of Action   []Label Reading: CHO/ Na/ Fat/ other_________ []Self Blood Glucose Monitoring   [x]Weight/BMI Goals: gain/lose/maintain: did not focus on wt today, pt stated he wants to lose wt but  []Other -           Comprehension: []Excellent  []Very Good  [x]Good  []Fair   []Poor    Receptivity: []Excellent  []Very Good  [x]Good  []Fair   []Poor   "  Expected Compliance: []Excellent  []Very Good  [x]Good  []Fair   []Poor        Goals (initial): 05/21/24  Follow a gluten free diet.   2.    Eat breakfast 2-3 days/week   3.     Labs:  CMP  Lab Results   Component Value Date     11/22/2014    K 4.3 09/08/2023     09/08/2023    CO2 27 09/08/2023    ANIONGAP 7 11/22/2014    BUN 13 09/08/2023    CREATININE 1.14 09/08/2023    GLUCOSE 89 11/22/2014    GLUF 108 (H) 09/08/2023    CALCIUM 9.3 09/08/2023    AST 22 09/08/2023    ALT 20 09/08/2023    ALKPHOS 65 09/08/2023    PROT 7.2 11/22/2014    BILITOT 0.5 11/22/2014    EGFR 66 09/08/2023       BMP  Lab Results   Component Value Date    GLUCOSE 89 11/22/2014    CALCIUM 9.3 09/08/2023     11/22/2014    K 4.3 09/08/2023    CO2 27 09/08/2023     09/08/2023    BUN 13 09/08/2023    CREATININE 1.14 09/08/2023       Lipids  No results found for: \"CHOL\"  Lab Results   Component Value Date    HDL 74 07/12/2023    HDL 50 04/13/2022    HDL 44 09/15/2018     Lab Results   Component Value Date    LDLCALC 172 (H) 07/12/2023    LDLCALC 181 (H) 04/13/2022    LDLCALC 145 (H) 09/15/2018     Lab Results   Component Value Date    TRIG 127 07/12/2023    TRIG 86 04/13/2022    TRIG 119 09/15/2018     No results found for: \"CHOLHDL\"    Hemoglobin A1C  Lab Results   Component Value Date    HGBA1C 5.0 07/12/2023       Fasting Glucose  Lab Results   Component Value Date    GLUF 108 (H) 09/08/2023       Insulin     Thyroid  No results found for: \"TSH\", \"E0RGLKJ\", \"O4KHXYH\", \"THYROIDAB\"    Hepatic Function Panel  Lab Results   Component Value Date    ALT 20 09/08/2023    AST 22 09/08/2023    ALKPHOS 65 09/08/2023    BILITOT 0.5 11/22/2014       Celiac Disease Antibody Panel  Endomysial IgA   Date Value Ref Range Status   07/12/2023 Negative Negative Final     Gliadin IgA   Date Value Ref Range Status   07/12/2023 37 (H) 0 - 19 units Final     Comment:                        Negative                   0 - 19                     " "Weak Positive             20 - 30                     Moderate to Strong Positive   >30     Gliadin IgG   Date Value Ref Range Status   07/12/2023 26 (H) 0 - 19 units Final     Comment:                        Negative                   0 - 19                     Weak Positive             20 - 30                     Moderate to Strong Positive   >30     IgA   Date Value Ref Range Status   07/12/2023 170 61 - 437 mg/dL Final     TISSUE TRANSGLUTAMINASE IGA   Date Value Ref Range Status   07/12/2023 6 (H) 0 - 3 U/mL Final     Comment:                                   Negative        0 -  3                                Weak Positive   4 - 10                                Positive           >10   Tissue Transglutaminase (tTG) has been identified   as the endomysial antigen.  Studies have demonstr-   ated that endomysial IgA antibodies have over 99%   specificity for gluten sensitive enteropathy.      Iron  No results found for: \"IRON\", \"TIBC\", \"FERRITIN\"         Rena Dean RD LDN  [facility] CLINICAL NUTRITION SERVICES  [address] [place] [phone]    "

## 2024-07-03 DIAGNOSIS — E78.5 HYPERLIPIDEMIA, UNSPECIFIED HYPERLIPIDEMIA TYPE: ICD-10-CM

## 2024-07-03 RX ORDER — ATORVASTATIN CALCIUM 20 MG/1
20 TABLET, FILM COATED ORAL DAILY
Qty: 100 TABLET | Refills: 1 | Status: SHIPPED | OUTPATIENT
Start: 2024-07-03

## 2024-07-29 DIAGNOSIS — K20.90 ESOPHAGITIS: ICD-10-CM

## 2024-07-30 RX ORDER — OMEPRAZOLE 40 MG/1
40 CAPSULE, DELAYED RELEASE ORAL DAILY
Qty: 100 CAPSULE | Refills: 1 | Status: SHIPPED | OUTPATIENT
Start: 2024-07-30 | End: 2025-01-26

## 2024-09-09 ENCOUNTER — TELEPHONE (OUTPATIENT)
Dept: FAMILY MEDICINE CLINIC | Facility: CLINIC | Age: 69
End: 2024-09-09

## 2024-09-09 NOTE — TELEPHONE ENCOUNTER
Called patient in attempt to schedule their overdue Medicare Annual Wellness Visit.    Patient will look at calendar and give a call to be to schedule.

## 2024-09-11 PROBLEM — Z91.018 ALLERGY TO GLUTEN: Status: ACTIVE | Noted: 2024-09-11

## 2024-09-27 ENCOUNTER — TELEPHONE (OUTPATIENT)
Dept: FAMILY MEDICINE CLINIC | Facility: CLINIC | Age: 69
End: 2024-09-27

## 2024-09-27 NOTE — TELEPHONE ENCOUNTER
Called and spoke to patient he stated please give him a call next week to schedule for his AWV   You can access the FollowMyHealth Patient Portal offered by [facility] by registering at the following website: [email or web address]. By joining [facility]’s FollowMyHealth portal, you will also be able to view your health information using other applications (apps) compatible with our system.

## 2024-11-07 ENCOUNTER — TELEPHONE (OUTPATIENT)
Dept: FAMILY MEDICINE CLINIC | Facility: CLINIC | Age: 69
End: 2024-11-07

## 2024-12-05 ENCOUNTER — TELEPHONE (OUTPATIENT)
Dept: FAMILY MEDICINE CLINIC | Facility: CLINIC | Age: 69
End: 2024-12-05

## 2025-02-25 ENCOUNTER — TELEPHONE (OUTPATIENT)
Dept: FAMILY MEDICINE CLINIC | Facility: CLINIC | Age: 70
End: 2025-02-25

## 2025-02-27 ENCOUNTER — RA CDI HCC (OUTPATIENT)
Dept: OTHER | Facility: HOSPITAL | Age: 70
End: 2025-02-27

## 2025-03-06 ENCOUNTER — OFFICE VISIT (OUTPATIENT)
Dept: FAMILY MEDICINE CLINIC | Facility: CLINIC | Age: 70
End: 2025-03-06
Payer: MEDICARE

## 2025-03-06 VITALS
BODY MASS INDEX: 33.71 KG/M2 | DIASTOLIC BLOOD PRESSURE: 86 MMHG | HEIGHT: 67 IN | SYSTOLIC BLOOD PRESSURE: 130 MMHG | TEMPERATURE: 98 F | RESPIRATION RATE: 16 BRPM | OXYGEN SATURATION: 96 % | HEART RATE: 85 BPM | WEIGHT: 214.8 LBS

## 2025-03-06 DIAGNOSIS — R35.0 BENIGN PROSTATIC HYPERPLASIA WITH URINARY FREQUENCY: ICD-10-CM

## 2025-03-06 DIAGNOSIS — K21.9 GASTROESOPHAGEAL REFLUX DISEASE, UNSPECIFIED WHETHER ESOPHAGITIS PRESENT: ICD-10-CM

## 2025-03-06 DIAGNOSIS — Z91.018 ALLERGY TO GLUTEN: ICD-10-CM

## 2025-03-06 DIAGNOSIS — N52.9 ERECTILE DYSFUNCTION, UNSPECIFIED ERECTILE DYSFUNCTION TYPE: ICD-10-CM

## 2025-03-06 DIAGNOSIS — I10 BENIGN ESSENTIAL HTN: Primary | ICD-10-CM

## 2025-03-06 DIAGNOSIS — N40.1 BENIGN PROSTATIC HYPERPLASIA WITH URINARY FREQUENCY: ICD-10-CM

## 2025-03-06 DIAGNOSIS — Z13.1 SCREENING FOR DIABETES MELLITUS: ICD-10-CM

## 2025-03-06 DIAGNOSIS — R21 RASH: ICD-10-CM

## 2025-03-06 DIAGNOSIS — E66.9 OBESITY (BMI 30-39.9): ICD-10-CM

## 2025-03-06 DIAGNOSIS — Z00.00 MEDICARE ANNUAL WELLNESS VISIT, SUBSEQUENT: ICD-10-CM

## 2025-03-06 DIAGNOSIS — Z12.5 SCREENING FOR PROSTATE CANCER: ICD-10-CM

## 2025-03-06 DIAGNOSIS — E78.5 HYPERLIPIDEMIA, UNSPECIFIED HYPERLIPIDEMIA TYPE: ICD-10-CM

## 2025-03-06 PROCEDURE — G2211 COMPLEX E/M VISIT ADD ON: HCPCS | Performed by: FAMILY MEDICINE

## 2025-03-06 PROCEDURE — 99214 OFFICE O/P EST MOD 30 MIN: CPT | Performed by: FAMILY MEDICINE

## 2025-03-06 PROCEDURE — G0438 PPPS, INITIAL VISIT: HCPCS | Performed by: FAMILY MEDICINE

## 2025-03-06 RX ORDER — SILDENAFIL 100 MG/1
100 TABLET, FILM COATED ORAL DAILY PRN
Qty: 90 TABLET | Refills: 0 | Status: SHIPPED | OUTPATIENT
Start: 2025-03-06

## 2025-03-06 RX ORDER — ATORVASTATIN CALCIUM 20 MG/1
20 TABLET, FILM COATED ORAL DAILY
Qty: 100 TABLET | Refills: 1 | Status: SHIPPED | OUTPATIENT
Start: 2025-03-06 | End: 2025-03-12

## 2025-03-06 NOTE — ASSESSMENT & PLAN NOTE
BP well controlled, not on any medications. Continue healthy lifestyle.  Orders:  •  Comprehensive metabolic panel; Future

## 2025-03-06 NOTE — ASSESSMENT & PLAN NOTE
Using Viagra PRN, infrequently.   Orders:  •  sildenafil (VIAGRA) 100 mg tablet; Take 1 tablet (100 mg total) by mouth daily as needed for erectile dysfunction

## 2025-03-06 NOTE — ASSESSMENT & PLAN NOTE
Tends to have breakouts in winter on forehead, likely from winter hat given pattern of skin irritation. Avoid triggers. Can use emollient and steroid cream as needed.

## 2025-03-06 NOTE — ASSESSMENT & PLAN NOTE
Did not establish with Urology as previously recommended. Will monitor PSA, check now.  Orders:  •  PSA, Total Screen; Future

## 2025-03-06 NOTE — ASSESSMENT & PLAN NOTE
Orders:  •  Lipid panel; Future  •  atorvastatin (LIPITOR) 20 mg tablet; Take 1 tablet (20 mg total) by mouth daily

## 2025-03-06 NOTE — PROGRESS NOTES
Name: Wojciech Levy      : 1955      MRN: [id number]  Encounter Provider: Ирина Daily DO  Encounter Date: 3/6/2025   Encounter department: [facility]    Assessment & Plan  Benign essential HTN  BP well controlled, not on any medications. Continue healthy lifestyle.  Orders:  •  Comprehensive metabolic panel; Future    Gastroesophageal reflux disease, unspecified whether esophagitis present  Symptoms well controlled on Omeprazole, continue current regimen.       Allergy to gluten  Rash after accidental exposure to gluten last week, resolving.        Rash  Tends to have breakouts in winter on forehead, likely from winter hat given pattern of skin irritation. Avoid triggers. Can use emollient and steroid cream as needed.        Benign prostatic hyperplasia with urinary frequency  Did not establish with Urology as previously recommended. Will monitor PSA, check now.  Orders:  •  PSA, Total Screen; Future    Screening for prostate cancer    Orders:  •  PSA, Total Screen; Future    Hyperlipidemia, unspecified hyperlipidemia type    Orders:  •  Lipid panel; Future  •  atorvastatin (LIPITOR) 20 mg tablet; Take 1 tablet (20 mg total) by mouth daily    Medicare annual wellness visit, subsequent         Screening for diabetes mellitus    Orders:  •  Hemoglobin A1C; Future    Obesity (BMI 30-39.9)      Recommend healthy diet and regular exercise. Follow up in 6 months.  Orders:  •  Hemoglobin A1C; Future    Erectile dysfunction, unspecified erectile dysfunction type  Using Viagra PRN, infrequently.   Orders:  •  sildenafil (VIAGRA) 100 mg tablet; Take 1 tablet (100 mg total) by mouth daily as needed for erectile dysfunction       Preventive health issues were discussed with patient, and age appropriate screening tests were ordered as noted in patient's After Visit Summary. Personalized health advice and appropriate referrals for health education or preventive services given if needed, as  noted in patient's After Visit Summary.    History of Present Illness       HPI   Here today for well visit. No acute concerns except as noted above. Declines flu shot.    Patient Care Team:  Ирина Daily DO as PCP - General (Family Medicine)  Nahun Archer MD    Review of Systems  Medical History Reviewed by provider this encounter:  Meds       Annual Wellness Visit Questionnaire   Wojciech is here for his Subsequent Wellness visit.     Health Risk Assessment:   Patient rates overall health as good. Patient feels that their physical health rating is same. Patient is satisfied with their life. Eyesight was rated as same. Hearing was rated as same. Patient feels that their emotional and mental health rating is same. Patients states they are never, rarely angry. Patient states they are sometimes unusually tired/fatigued. Pain experienced in the last 7 days has been some. Patient's pain rating has been 3/10. Patient states that he has experienced no weight loss or gain in last 6 months.     Depression Screening:   PHQ-2 Score: 0      Fall Risk Screening:   In the past year, patient has experienced: history of falling in past year    Number of falls: 2 or more  Injured during fall?: No    Feels unsteady when standing or walking?: No    Worried about falling?: No      Home Safety:  Patient has trouble with stairs inside or outside of their home. Patient has working smoke alarms and has working carbon monoxide detector. Home safety hazards include: none.     Nutrition:   Current diet is Regular. Patient watch what he eats     Medications:   Patient is currently taking over-the-counter supplements. OTC medications include: see medication list. Patient is able to manage medications.     Activities of Daily Living (ADLs)/Instrumental Activities of Daily Living (IADLs):   Walk and transfer into and out of bed and chair?: Yes  Dress and groom yourself?: Yes    Bathe or shower yourself?: Yes    Feed yourself? Yes  Do  your laundry/housekeeping?: Yes  Manage your money, pay your bills and track your expenses?: Yes  Make your own meals?: Yes    Do your own shopping?: Yes    Previous Hospitalizations:   Any hospitalizations or ED visits within the last 12 months?: No      Advance Care Planning:   Living will: Yes    Advanced directive: Yes      PREVENTIVE SCREENINGS      Cardiovascular Screening:    General: Screening Not Indicated and History Lipid Disorder      Diabetes Screening:     General: Screening Current      Colorectal Cancer Screening:     General: Screening Current      Prostate Cancer Screening:    General: Screening Current      Abdominal Aortic Aneurysm (AAA) Screening:    Risk factors include: age between 65-76 yo and tobacco use        Lung Cancer Screening:     General: Screening Not Indicated      Hepatitis C Screening:    General: Screening Current    Screening, Brief Intervention, and Referral to Treatment (SBIRT)     Screening  Typical number of drinks in a day: 0  Typical number of drinks in a week: 0  Interpretation: Low risk drinking behavior.    Single Item Drug Screening:  How often have you used an illegal drug (including marijuana) or a prescription medication for non-medical reasons in the past year? never    Single Item Drug Screen Score: 0  Interpretation: Negative screen for possible drug use disorder    Social Drivers of Health     Financial Resource Strain: Low Risk  (9/14/2023)    Overall Financial Resource Strain (CARDIA)    • Difficulty of Paying Living Expenses: Not very hard   Food Insecurity: No Food Insecurity (3/6/2025)    Hunger Vital Sign    • Worried About Running Out of Food in the Last Year: Never true    • Ran Out of Food in the Last Year: Never true   Transportation Needs: No Transportation Needs (3/6/2025)    PRAPARE - Transportation    • Lack of Transportation (Medical): No    • Lack of Transportation (Non-Medical): No   Housing Stability: Unknown (3/6/2025)    Housing Stability  "Vital Sign    • Unable to Pay for Housing in the Last Year: No   Utilities: Not At Risk (3/6/2025)    [facility] Utilities    • Threatened with loss of utilities: No     No results found.    Objective   /86 (BP Location: Left arm, Patient Position: Sitting, Cuff Size: Standard)   Pulse 85   Temp 98 °F (36.7 °C) (Temporal)   Resp 16   Ht 5' 7\" (1.702 m)   Wt 97.4 kg (214 lb 12.8 oz)   SpO2 96%   BMI 33.64 kg/m²     Physical Exam  Vitals reviewed.   Constitutional:       Appearance: Normal appearance.   Cardiovascular:      Rate and Rhythm: Normal rate and regular rhythm.      Heart sounds: Normal heart sounds.   Pulmonary:      Effort: Pulmonary effort is normal.      Breath sounds: Normal breath sounds.   Abdominal:      General: Abdomen is flat.   Skin:     General: Skin is warm and dry.      Capillary Refill: Capillary refill takes less than 2 seconds.      Comments: Patchy erythema on the forehead with dry skin, dermatitis like rash.   Neurological:      Mental Status: He is alert and oriented to person, place, and time.   Psychiatric:         Mood and Affect: Mood normal.         Behavior: Behavior normal.         "

## 2025-03-09 DIAGNOSIS — K20.90 ESOPHAGITIS: ICD-10-CM

## 2025-03-11 RX ORDER — OMEPRAZOLE 40 MG/1
40 CAPSULE, DELAYED RELEASE ORAL DAILY
Qty: 90 CAPSULE | Refills: 2 | Status: SHIPPED | OUTPATIENT
Start: 2025-03-11 | End: 2025-09-07

## 2025-03-12 ENCOUNTER — RESULTS FOLLOW-UP (OUTPATIENT)
Dept: FAMILY MEDICINE CLINIC | Facility: CLINIC | Age: 70
End: 2025-03-12

## 2025-03-12 ENCOUNTER — APPOINTMENT (OUTPATIENT)
Dept: LAB | Facility: CLINIC | Age: 70
End: 2025-03-12
Payer: MEDICARE

## 2025-03-12 DIAGNOSIS — E78.5 HYPERLIPIDEMIA, UNSPECIFIED HYPERLIPIDEMIA TYPE: ICD-10-CM

## 2025-03-12 DIAGNOSIS — E66.9 OBESITY (BMI 30-39.9): ICD-10-CM

## 2025-03-12 DIAGNOSIS — R35.0 BENIGN PROSTATIC HYPERPLASIA WITH URINARY FREQUENCY: ICD-10-CM

## 2025-03-12 DIAGNOSIS — I10 BENIGN ESSENTIAL HTN: ICD-10-CM

## 2025-03-12 DIAGNOSIS — Z12.5 SCREENING FOR PROSTATE CANCER: ICD-10-CM

## 2025-03-12 DIAGNOSIS — N40.1 BENIGN PROSTATIC HYPERPLASIA WITH URINARY FREQUENCY: ICD-10-CM

## 2025-03-12 DIAGNOSIS — Z13.1 SCREENING FOR DIABETES MELLITUS: ICD-10-CM

## 2025-03-12 LAB
ALBUMIN SERPL BCG-MCNC: 4.4 G/DL (ref 3.5–5)
ALP SERPL-CCNC: 72 U/L (ref 34–104)
ALT SERPL W P-5'-P-CCNC: 33 U/L (ref 7–52)
ANION GAP SERPL CALCULATED.3IONS-SCNC: 11 MMOL/L (ref 4–13)
AST SERPL W P-5'-P-CCNC: 33 U/L (ref 13–39)
BILIRUB SERPL-MCNC: 0.96 MG/DL (ref 0.2–1)
BUN SERPL-MCNC: 14 MG/DL (ref 5–25)
CALCIUM SERPL-MCNC: 9.3 MG/DL (ref 8.4–10.2)
CHLORIDE SERPL-SCNC: 100 MMOL/L (ref 96–108)
CHOLEST SERPL-MCNC: 265 MG/DL (ref ?–200)
CO2 SERPL-SCNC: 25 MMOL/L (ref 21–32)
CREAT SERPL-MCNC: 1.16 MG/DL (ref 0.6–1.3)
EST. AVERAGE GLUCOSE BLD GHB EST-MCNC: 111 MG/DL
GFR SERPL CREATININE-BSD FRML MDRD: 64 ML/MIN/1.73SQ M
GLUCOSE P FAST SERPL-MCNC: 104 MG/DL (ref 65–99)
HBA1C MFR BLD: 5.5 %
HDLC SERPL-MCNC: 66 MG/DL
LDLC SERPL CALC-MCNC: 172 MG/DL (ref 0–100)
NONHDLC SERPL-MCNC: 199 MG/DL
POTASSIUM SERPL-SCNC: 4.2 MMOL/L (ref 3.5–5.3)
PROT SERPL-MCNC: 6.9 G/DL (ref 6.4–8.4)
PSA SERPL-MCNC: 2.23 NG/ML (ref 0–4)
SODIUM SERPL-SCNC: 136 MMOL/L (ref 135–147)
TRIGL SERPL-MCNC: 137 MG/DL (ref ?–150)

## 2025-03-12 PROCEDURE — 36415 COLL VENOUS BLD VENIPUNCTURE: CPT

## 2025-03-12 PROCEDURE — 80061 LIPID PANEL: CPT

## 2025-03-12 PROCEDURE — 80053 COMPREHEN METABOLIC PANEL: CPT

## 2025-03-12 PROCEDURE — 83036 HEMOGLOBIN GLYCOSYLATED A1C: CPT

## 2025-03-12 PROCEDURE — G0103 PSA SCREENING: HCPCS

## 2025-03-12 RX ORDER — ATORVASTATIN CALCIUM 40 MG/1
40 TABLET, FILM COATED ORAL DAILY
Qty: 90 TABLET | Refills: 3 | Status: SHIPPED | OUTPATIENT
Start: 2025-03-12

## 2025-04-11 ENCOUNTER — DOCUMENTATION (OUTPATIENT)
Dept: ADMINISTRATIVE | Facility: OTHER | Age: 70
End: 2025-04-11

## 2025-04-11 NOTE — PROGRESS NOTES
04/11/25 10:33 AM    Annual Wellness Visit outreach is not required, an AWV was completed at the PCP office.    Thank you.  Stephon Mason MA  [facility]